# Patient Record
Sex: FEMALE | Race: WHITE | NOT HISPANIC OR LATINO | Employment: UNEMPLOYED | URBAN - NONMETROPOLITAN AREA
[De-identification: names, ages, dates, MRNs, and addresses within clinical notes are randomized per-mention and may not be internally consistent; named-entity substitution may affect disease eponyms.]

---

## 2019-01-01 ENCOUNTER — LAB REQUISITION (OUTPATIENT)
Dept: LAB | Facility: HOSPITAL | Age: 84
End: 2019-01-01

## 2019-01-01 ENCOUNTER — TRANSCRIBE ORDERS (OUTPATIENT)
Dept: ADMINISTRATIVE | Facility: HOSPITAL | Age: 84
End: 2019-01-01

## 2019-01-01 ENCOUNTER — HOSPITAL ENCOUNTER (INPATIENT)
Facility: HOSPITAL | Age: 84
LOS: 2 days | Discharge: HOME HEALTH | End: 2019-12-30
Attending: EMERGENCY MEDICINE | Admitting: INTERNAL MEDICINE

## 2019-01-01 ENCOUNTER — APPOINTMENT (OUTPATIENT)
Dept: LAB | Facility: HOSPITAL | Age: 84
End: 2019-01-01

## 2019-01-01 ENCOUNTER — TRANSCRIBE ORDERS (OUTPATIENT)
Dept: INFUSION THERAPY | Facility: HOSPITAL | Age: 84
End: 2019-01-01

## 2019-01-01 ENCOUNTER — HOSPITAL ENCOUNTER (OUTPATIENT)
Dept: GENERAL RADIOLOGY | Facility: HOSPITAL | Age: 84
Discharge: HOME | End: 2019-11-22

## 2019-01-01 ENCOUNTER — HOSPITAL ENCOUNTER (OUTPATIENT)
Dept: INFUSION THERAPY | Facility: HOSPITAL | Age: 84
Discharge: HOME | End: 2019-12-06
Admitting: INTERNAL MEDICINE

## 2019-01-01 ENCOUNTER — READMISSION MANAGEMENT (OUTPATIENT)
Dept: CALL CENTER | Facility: HOSPITAL | Age: 84
End: 2019-01-01

## 2019-01-01 ENCOUNTER — APPOINTMENT (OUTPATIENT)
Dept: GENERAL RADIOLOGY | Facility: HOSPITAL | Age: 84
End: 2019-01-01

## 2019-01-01 ENCOUNTER — HOSPITAL ENCOUNTER (OUTPATIENT)
Dept: INFUSION THERAPY | Facility: HOSPITAL | Age: 84
Discharge: HOME | End: 2019-11-22
Admitting: INTERNAL MEDICINE

## 2019-01-01 ENCOUNTER — HOSPITAL ENCOUNTER (EMERGENCY)
Facility: HOSPITAL | Age: 84
Discharge: HOME | End: 2019-10-15
Attending: EMERGENCY MEDICINE | Admitting: EMERGENCY MEDICINE

## 2019-01-01 ENCOUNTER — HOSPITAL ENCOUNTER (OUTPATIENT)
Dept: INFUSION THERAPY | Facility: HOSPITAL | Age: 84
Discharge: HOME | End: 2019-11-21
Admitting: INTERNAL MEDICINE

## 2019-01-01 ENCOUNTER — HOSPITAL ENCOUNTER (OUTPATIENT)
Dept: INFUSION THERAPY | Facility: HOSPITAL | Age: 84
Discharge: HOME | End: 2019-11-07
Admitting: INTERNAL MEDICINE

## 2019-01-01 ENCOUNTER — HOSPITAL ENCOUNTER (INPATIENT)
Facility: HOSPITAL | Age: 84
LOS: 6 days | Discharge: HOME HEALTH | End: 2019-11-28
Attending: EMERGENCY MEDICINE | Admitting: INTERNAL MEDICINE

## 2019-01-01 ENCOUNTER — HOSPITAL ENCOUNTER (INPATIENT)
Facility: HOSPITAL | Age: 84
LOS: 2 days | Discharge: HOME HEALTH | End: 2019-07-12
Attending: EMERGENCY MEDICINE | Admitting: HOSPITALIST

## 2019-01-01 ENCOUNTER — HOSPITAL ENCOUNTER (OUTPATIENT)
Dept: INFUSION THERAPY | Facility: HOSPITAL | Age: 84
Discharge: HOME | End: 2019-12-18
Admitting: INTERNAL MEDICINE

## 2019-01-01 ENCOUNTER — APPOINTMENT (OUTPATIENT)
Dept: ONCOLOGY | Facility: HOSPITAL | Age: 84
End: 2019-01-01

## 2019-01-01 ENCOUNTER — TELEPHONE (OUTPATIENT)
Dept: MEDSURG UNIT | Facility: HOSPITAL | Age: 84
End: 2019-01-01

## 2019-01-01 ENCOUNTER — LAB (OUTPATIENT)
Dept: LAB | Facility: HOSPITAL | Age: 84
End: 2019-01-01

## 2019-01-01 ENCOUNTER — APPOINTMENT (OUTPATIENT)
Dept: CT IMAGING | Facility: HOSPITAL | Age: 84
End: 2019-01-01

## 2019-01-01 ENCOUNTER — HOSPITAL ENCOUNTER (OUTPATIENT)
Dept: INFUSION THERAPY | Facility: HOSPITAL | Age: 84
Discharge: HOME | End: 2019-11-11
Admitting: INTERNAL MEDICINE

## 2019-01-01 ENCOUNTER — HOSPITAL ENCOUNTER (OUTPATIENT)
Facility: HOSPITAL | Age: 84
Setting detail: OBSERVATION
Discharge: HOME | End: 2019-11-14
Attending: INTERNAL MEDICINE | Admitting: INTERNAL MEDICINE

## 2019-01-01 ENCOUNTER — HOSPITAL ENCOUNTER (OUTPATIENT)
Dept: INFUSION THERAPY | Facility: HOSPITAL | Age: 84
Discharge: HOME | End: 2019-12-11
Admitting: INTERNAL MEDICINE

## 2019-01-01 ENCOUNTER — HOSPITAL ENCOUNTER (OUTPATIENT)
Dept: INFUSION THERAPY | Facility: HOSPITAL | Age: 84
Discharge: HOME | End: 2019-12-19
Admitting: INTERNAL MEDICINE

## 2019-01-01 ENCOUNTER — LAB (OUTPATIENT)
Dept: INFUSION THERAPY | Facility: HOSPITAL | Age: 84
End: 2019-01-01

## 2019-01-01 ENCOUNTER — HOSPITAL ENCOUNTER (OUTPATIENT)
Dept: INFUSION THERAPY | Facility: HOSPITAL | Age: 84
Setting detail: INFUSION SERIES
Discharge: HOME | End: 2019-11-29

## 2019-01-01 VITALS
DIASTOLIC BLOOD PRESSURE: 70 MMHG | OXYGEN SATURATION: 95 % | SYSTOLIC BLOOD PRESSURE: 132 MMHG | RESPIRATION RATE: 16 BRPM | HEART RATE: 73 BPM | TEMPERATURE: 97.4 F

## 2019-01-01 VITALS
HEART RATE: 90 BPM | DIASTOLIC BLOOD PRESSURE: 52 MMHG | SYSTOLIC BLOOD PRESSURE: 120 MMHG | RESPIRATION RATE: 16 BRPM | TEMPERATURE: 98.7 F

## 2019-01-01 VITALS
RESPIRATION RATE: 20 BRPM | HEART RATE: 81 BPM | OXYGEN SATURATION: 98 % | WEIGHT: 128 LBS | TEMPERATURE: 98.2 F | BODY MASS INDEX: 25.13 KG/M2 | SYSTOLIC BLOOD PRESSURE: 132 MMHG | DIASTOLIC BLOOD PRESSURE: 88 MMHG | HEIGHT: 60 IN

## 2019-01-01 VITALS
DIASTOLIC BLOOD PRESSURE: 61 MMHG | SYSTOLIC BLOOD PRESSURE: 156 MMHG | HEART RATE: 78 BPM | RESPIRATION RATE: 18 BRPM | TEMPERATURE: 98.3 F

## 2019-01-01 VITALS
RESPIRATION RATE: 18 BRPM | WEIGHT: 111.3 LBS | TEMPERATURE: 97.7 F | BODY MASS INDEX: 21.85 KG/M2 | HEART RATE: 65 BPM | DIASTOLIC BLOOD PRESSURE: 68 MMHG | SYSTOLIC BLOOD PRESSURE: 152 MMHG | HEIGHT: 60 IN | OXYGEN SATURATION: 95 %

## 2019-01-01 VITALS
RESPIRATION RATE: 18 BRPM | DIASTOLIC BLOOD PRESSURE: 61 MMHG | OXYGEN SATURATION: 93 % | HEIGHT: 61 IN | WEIGHT: 137.25 LBS | BODY MASS INDEX: 25.91 KG/M2 | SYSTOLIC BLOOD PRESSURE: 123 MMHG | TEMPERATURE: 97.7 F | HEART RATE: 76 BPM

## 2019-01-01 VITALS
TEMPERATURE: 97.8 F | OXYGEN SATURATION: 99 % | RESPIRATION RATE: 18 BRPM | DIASTOLIC BLOOD PRESSURE: 48 MMHG | HEART RATE: 84 BPM | SYSTOLIC BLOOD PRESSURE: 123 MMHG

## 2019-01-01 VITALS
HEART RATE: 90 BPM | RESPIRATION RATE: 20 BRPM | SYSTOLIC BLOOD PRESSURE: 140 MMHG | TEMPERATURE: 97.8 F | OXYGEN SATURATION: 98 % | DIASTOLIC BLOOD PRESSURE: 68 MMHG

## 2019-01-01 VITALS
RESPIRATION RATE: 20 BRPM | HEART RATE: 97 BPM | SYSTOLIC BLOOD PRESSURE: 144 MMHG | DIASTOLIC BLOOD PRESSURE: 66 MMHG | OXYGEN SATURATION: 98 % | TEMPERATURE: 97.9 F

## 2019-01-01 VITALS
RESPIRATION RATE: 18 BRPM | HEART RATE: 92 BPM | WEIGHT: 124 LBS | OXYGEN SATURATION: 94 % | DIASTOLIC BLOOD PRESSURE: 58 MMHG | SYSTOLIC BLOOD PRESSURE: 135 MMHG | BODY MASS INDEX: 24.35 KG/M2 | TEMPERATURE: 100.2 F | HEIGHT: 60 IN

## 2019-01-01 VITALS
WEIGHT: 127.6 LBS | RESPIRATION RATE: 18 BRPM | BODY MASS INDEX: 25.05 KG/M2 | HEART RATE: 79 BPM | DIASTOLIC BLOOD PRESSURE: 56 MMHG | HEIGHT: 60 IN | OXYGEN SATURATION: 97 % | TEMPERATURE: 98 F | SYSTOLIC BLOOD PRESSURE: 139 MMHG

## 2019-01-01 VITALS
OXYGEN SATURATION: 95 % | SYSTOLIC BLOOD PRESSURE: 140 MMHG | TEMPERATURE: 98.1 F | RESPIRATION RATE: 17 BRPM | DIASTOLIC BLOOD PRESSURE: 59 MMHG | HEART RATE: 82 BPM

## 2019-01-01 VITALS
DIASTOLIC BLOOD PRESSURE: 58 MMHG | RESPIRATION RATE: 18 BRPM | OXYGEN SATURATION: 94 % | HEART RATE: 99 BPM | SYSTOLIC BLOOD PRESSURE: 133 MMHG | TEMPERATURE: 98.4 F

## 2019-01-01 DIAGNOSIS — D46.9 MYELODYSPLASTIC SYNDROME, UNSPECIFIED (HCC): ICD-10-CM

## 2019-01-01 DIAGNOSIS — S32.592A: Primary | ICD-10-CM

## 2019-01-01 DIAGNOSIS — D70.9 CHRONIC IDIOPATHIC NEUTROPENIA (HCC): ICD-10-CM

## 2019-01-01 DIAGNOSIS — D69.3 IMMUNE THROMBOCYTOPENIC PURPURA (HCC): ICD-10-CM

## 2019-01-01 DIAGNOSIS — D46.9 MYELODYSPLASTIC SYNDROME (HCC): ICD-10-CM

## 2019-01-01 DIAGNOSIS — D64.9 ANEMIA, UNSPECIFIED TYPE: Primary | ICD-10-CM

## 2019-01-01 DIAGNOSIS — R06.02 SHORTNESS OF BREATH: ICD-10-CM

## 2019-01-01 DIAGNOSIS — I10 ESSENTIAL (PRIMARY) HYPERTENSION: ICD-10-CM

## 2019-01-01 DIAGNOSIS — R50.81 NEUTROPENIC FEVER (HCC): ICD-10-CM

## 2019-01-01 DIAGNOSIS — D46.22 REFRACTORY ANEMIA WITH EXCESS BLASTS-2 (HCC): Primary | ICD-10-CM

## 2019-01-01 DIAGNOSIS — D59.10 AUTOIMMUNE HEMOLYTIC ANEMIA WITH IMMUNE THROMBOCYTOPENIA (HCC): Primary | ICD-10-CM

## 2019-01-01 DIAGNOSIS — D70.9 NEUTROPENIA, UNSPECIFIED TYPE (HCC): ICD-10-CM

## 2019-01-01 DIAGNOSIS — D46.22 RAEB-2 (REFRACTORY ANEMIA WITH EXCESS BLASTS-2) (HCC): Primary | ICD-10-CM

## 2019-01-01 DIAGNOSIS — C92.90 MYELOID LEUKEMIA, NOT HAVING ACHIEVED REMISSION, UNSPECIFIED MYELOID LEUKEMIA TYPE (HCC): Primary | ICD-10-CM

## 2019-01-01 DIAGNOSIS — D64.9 ANEMIA, UNSPECIFIED TYPE: ICD-10-CM

## 2019-01-01 DIAGNOSIS — D61.818 PANCYTOPENIA (HCC): ICD-10-CM

## 2019-01-01 DIAGNOSIS — D46.22 RAEB-2 (REFRACTORY ANEMIA WITH EXCESS BLASTS-2) (HCC): ICD-10-CM

## 2019-01-01 DIAGNOSIS — D69.6 THROMBOCYTOPENIA (HCC): ICD-10-CM

## 2019-01-01 DIAGNOSIS — D69.6 THROMBOCYTOPENIA (HCC): Primary | ICD-10-CM

## 2019-01-01 DIAGNOSIS — C95.90: ICD-10-CM

## 2019-01-01 DIAGNOSIS — J18.9 COMMUNITY ACQUIRED PNEUMONIA OF LEFT UPPER LOBE OF LUNG: Primary | ICD-10-CM

## 2019-01-01 DIAGNOSIS — N39.0 ACUTE LOWER UTI: Primary | ICD-10-CM

## 2019-01-01 DIAGNOSIS — R41.82 ALTERED MENTAL STATUS, UNSPECIFIED ALTERED MENTAL STATUS TYPE: ICD-10-CM

## 2019-01-01 DIAGNOSIS — D69.6 AUTOIMMUNE HEMOLYTIC ANEMIA WITH IMMUNE THROMBOCYTOPENIA (HCC): Primary | ICD-10-CM

## 2019-01-01 DIAGNOSIS — R68.89 OTHER GENERAL SYMPTOMS AND SIGNS: ICD-10-CM

## 2019-01-01 DIAGNOSIS — R05.9 COUGH: ICD-10-CM

## 2019-01-01 DIAGNOSIS — D70.9 NEUTROPENIC SEPSIS (HCC): ICD-10-CM

## 2019-01-01 DIAGNOSIS — R50.81 FEBRILE NEUTROPENIA (HCC): ICD-10-CM

## 2019-01-01 DIAGNOSIS — D64.9 ANEMIA, UNSPECIFIED: ICD-10-CM

## 2019-01-01 DIAGNOSIS — C95.92: ICD-10-CM

## 2019-01-01 DIAGNOSIS — C92.00 ACUTE MYELOID LEUKEMIA NOT HAVING ACHIEVED REMISSION (HCC): ICD-10-CM

## 2019-01-01 DIAGNOSIS — J18.9 PNEUMONIA OF LEFT UPPER LOBE DUE TO INFECTIOUS ORGANISM: Primary | ICD-10-CM

## 2019-01-01 DIAGNOSIS — R30.0 DYSURIA: ICD-10-CM

## 2019-01-01 DIAGNOSIS — A41.9 NEUTROPENIC SEPSIS (HCC): ICD-10-CM

## 2019-01-01 DIAGNOSIS — N39.0 URINARY TRACT INFECTION, SITE NOT SPECIFIED: ICD-10-CM

## 2019-01-01 DIAGNOSIS — D70.9 NEUTROPENIC FEVER (HCC): ICD-10-CM

## 2019-01-01 DIAGNOSIS — S32.010A COMPRESSION FRACTURE OF L1 VERTEBRA, INITIAL ENCOUNTER (HCC): ICD-10-CM

## 2019-01-01 DIAGNOSIS — R50.9 FEVER OF UNKNOWN ORIGIN: ICD-10-CM

## 2019-01-01 DIAGNOSIS — D64.9 CHRONIC ANEMIA: ICD-10-CM

## 2019-01-01 DIAGNOSIS — E83.42 HYPOMAGNESEMIA: ICD-10-CM

## 2019-01-01 DIAGNOSIS — D70.9 FEBRILE NEUTROPENIA (HCC): ICD-10-CM

## 2019-01-01 LAB
ABO + RH BLD: NORMAL
ABO GROUP BLD: NORMAL
ALBUMIN SERPL-MCNC: 2.55 G/DL (ref 3.5–5.2)
ALBUMIN SERPL-MCNC: 2.97 G/DL (ref 3.5–5.2)
ALBUMIN SERPL-MCNC: 2.98 G/DL (ref 3.5–5.2)
ALBUMIN SERPL-MCNC: 3.09 G/DL (ref 3.5–5.2)
ALBUMIN SERPL-MCNC: 3.28 G/DL (ref 3.5–5.2)
ALBUMIN SERPL-MCNC: 3.28 G/DL (ref 3.5–5.2)
ALBUMIN SERPL-MCNC: 3.35 G/DL (ref 3.5–5.2)
ALBUMIN SERPL-MCNC: 3.38 G/DL (ref 3.5–5.2)
ALBUMIN SERPL-MCNC: 3.57 G/DL (ref 3.5–5.2)
ALBUMIN SERPL-MCNC: 3.66 G/DL (ref 3.5–5.2)
ALBUMIN SERPL-MCNC: 3.81 G/DL (ref 3.5–5.2)
ALBUMIN SERPL-MCNC: 3.86 G/DL (ref 3.5–5.2)
ALBUMIN SERPL-MCNC: 3.94 G/DL (ref 3.5–5.2)
ALBUMIN SERPL-MCNC: 4.27 G/DL (ref 3.5–5.2)
ALBUMIN/GLOB SERPL: 0.6 G/DL
ALBUMIN/GLOB SERPL: 0.6 G/DL
ALBUMIN/GLOB SERPL: 0.7 G/DL
ALBUMIN/GLOB SERPL: 0.8 G/DL
ALBUMIN/GLOB SERPL: 0.9 G/DL
ALBUMIN/GLOB SERPL: 1 G/DL
ALBUMIN/GLOB SERPL: 1.1 G/DL
ALBUMIN/GLOB SERPL: 1.2 G/DL
ALBUMIN/GLOB SERPL: 1.3 G/DL
ALBUMIN/GLOB SERPL: 1.4 G/DL
ALP SERPL-CCNC: 131 U/L (ref 39–117)
ALP SERPL-CCNC: 58 U/L (ref 39–117)
ALP SERPL-CCNC: 62 U/L (ref 39–117)
ALP SERPL-CCNC: 63 U/L (ref 39–117)
ALP SERPL-CCNC: 63 U/L (ref 39–117)
ALP SERPL-CCNC: 68 U/L (ref 39–117)
ALP SERPL-CCNC: 69 U/L (ref 39–117)
ALP SERPL-CCNC: 72 U/L (ref 39–117)
ALP SERPL-CCNC: 75 U/L (ref 39–117)
ALP SERPL-CCNC: 77 U/L (ref 39–117)
ALP SERPL-CCNC: 80 U/L (ref 39–117)
ALP SERPL-CCNC: 81 U/L (ref 39–117)
ALP SERPL-CCNC: 83 U/L (ref 39–117)
ALP SERPL-CCNC: 94 U/L (ref 39–117)
ALT SERPL W P-5'-P-CCNC: 10 U/L (ref 1–33)
ALT SERPL W P-5'-P-CCNC: 11 U/L (ref 1–33)
ALT SERPL W P-5'-P-CCNC: 12 U/L (ref 1–33)
ALT SERPL W P-5'-P-CCNC: 15 U/L (ref 1–33)
ALT SERPL W P-5'-P-CCNC: 16 U/L (ref 1–33)
ALT SERPL W P-5'-P-CCNC: 6 U/L (ref 1–33)
ALT SERPL W P-5'-P-CCNC: 7 U/L (ref 1–33)
ALT SERPL W P-5'-P-CCNC: 8 U/L (ref 1–33)
ALT SERPL W P-5'-P-CCNC: 9 U/L (ref 1–33)
ALT SERPL W P-5'-P-CCNC: 9 U/L (ref 1–33)
ANION GAP SERPL CALCULATED.3IONS-SCNC: 11.4 MMOL/L (ref 5–15)
ANION GAP SERPL CALCULATED.3IONS-SCNC: 11.7 MMOL/L (ref 5–15)
ANION GAP SERPL CALCULATED.3IONS-SCNC: 11.7 MMOL/L (ref 5–15)
ANION GAP SERPL CALCULATED.3IONS-SCNC: 11.8 MMOL/L (ref 5–15)
ANION GAP SERPL CALCULATED.3IONS-SCNC: 11.9 MMOL/L (ref 5–15)
ANION GAP SERPL CALCULATED.3IONS-SCNC: 12.1 MMOL/L (ref 5–15)
ANION GAP SERPL CALCULATED.3IONS-SCNC: 12.2 MMOL/L (ref 5–15)
ANION GAP SERPL CALCULATED.3IONS-SCNC: 12.5 MMOL/L (ref 5–15)
ANION GAP SERPL CALCULATED.3IONS-SCNC: 12.7 MMOL/L (ref 5–15)
ANION GAP SERPL CALCULATED.3IONS-SCNC: 12.8 MMOL/L (ref 5–15)
ANION GAP SERPL CALCULATED.3IONS-SCNC: 12.8 MMOL/L (ref 5–15)
ANION GAP SERPL CALCULATED.3IONS-SCNC: 13 MMOL/L (ref 5–15)
ANION GAP SERPL CALCULATED.3IONS-SCNC: 13.2 MMOL/L (ref 5–15)
ANION GAP SERPL CALCULATED.3IONS-SCNC: 13.9 MMOL/L (ref 5–15)
ANION GAP SERPL CALCULATED.3IONS-SCNC: 14.4 MMOL/L (ref 5–15)
ANION GAP SERPL CALCULATED.3IONS-SCNC: 14.6 MMOL/L (ref 5–15)
ANION GAP SERPL CALCULATED.3IONS-SCNC: 14.8 MMOL/L (ref 5–15)
ANION GAP SERPL CALCULATED.3IONS-SCNC: 14.9 MMOL/L (ref 5–15)
ANION GAP SERPL CALCULATED.3IONS-SCNC: 17.5 MMOL/L (ref 5–15)
ANISOCYTOSIS BLD QL: ABNORMAL
ANISOCYTOSIS BLD QL: NORMAL
ANISOCYTOSIS BLD QL: NORMAL
AST SERPL-CCNC: 11 U/L (ref 1–32)
AST SERPL-CCNC: 12 U/L (ref 1–32)
AST SERPL-CCNC: 13 U/L (ref 1–32)
AST SERPL-CCNC: 14 U/L (ref 1–32)
AST SERPL-CCNC: 15 U/L (ref 1–32)
AST SERPL-CCNC: 18 U/L (ref 1–32)
AST SERPL-CCNC: 28 U/L (ref 1–32)
AST SERPL-CCNC: 9 U/L (ref 1–32)
B PERT DNA SPEC QL NAA+PROBE: NOT DETECTED
B PERT DNA SPEC QL NAA+PROBE: NOT DETECTED
BACTERIA SPEC AEROBE CULT: ABNORMAL
BACTERIA SPEC AEROBE CULT: ABNORMAL
BACTERIA SPEC AEROBE CULT: NORMAL
BACTERIA SPEC RESP CULT: ABNORMAL
BACTERIA SPEC RESP CULT: ABNORMAL
BACTERIA SPEC RESP CULT: NORMAL
BACTERIA UR QL AUTO: ABNORMAL /HPF
BASOPHILS # BLD AUTO: 0 10*3/MM3 (ref 0–0.2)
BASOPHILS # BLD AUTO: 0.01 10*3/MM3 (ref 0–0.2)
BASOPHILS NFR BLD AUTO: 0 % (ref 0–1.5)
BASOPHILS NFR BLD AUTO: 0.5 % (ref 0–1.5)
BH BB BLOOD EXPIRATION DATE: NORMAL
BH BB BLOOD TYPE BARCODE: 1700
BH BB BLOOD TYPE BARCODE: 5100
BH BB BLOOD TYPE BARCODE: 6200
BH BB BLOOD TYPE BARCODE: 7300
BH BB BLOOD TYPE BARCODE: 7300
BH BB DISPENSE STATUS: NORMAL
BH BB PRODUCT CODE: NORMAL
BH BB UNIT NUMBER: NORMAL
BILIRUB SERPL-MCNC: 0.2 MG/DL (ref 0.2–1.2)
BILIRUB SERPL-MCNC: 0.3 MG/DL (ref 0.2–1.2)
BILIRUB SERPL-MCNC: 0.4 MG/DL (ref 0.2–1.2)
BILIRUB SERPL-MCNC: 0.5 MG/DL (ref 0.2–1.2)
BILIRUB SERPL-MCNC: 0.7 MG/DL (ref 0.2–1.2)
BILIRUB UR QL STRIP: NEGATIVE
BLASTS NFR BLD MANUAL: 1 % (ref 0–0)
BLASTS NFR BLD MANUAL: 12 % (ref 0–0)
BLASTS NFR BLD MANUAL: 16 % (ref 0–0)
BLASTS NFR BLD MANUAL: 2 % (ref 0–0)
BLASTS NFR BLD MANUAL: 4 % (ref 0–0)
BLASTS NFR BLD MANUAL: 4 % (ref 0–0)
BLASTS NFR BLD MANUAL: 5 % (ref 0–0)
BLASTS NFR BLD MANUAL: 6 % (ref 0–0)
BLASTS NFR BLD MANUAL: 6 % (ref 0–0)
BLASTS NFR BLD MANUAL: 8 % (ref 0–0)
BLASTS NFR BLD MANUAL: 8 % (ref 0–0)
BLD GP AB SCN SERPL QL: NEGATIVE
BUN BLD-MCNC: 10 MG/DL (ref 8–23)
BUN BLD-MCNC: 11 MG/DL (ref 8–23)
BUN BLD-MCNC: 12 MG/DL (ref 8–23)
BUN BLD-MCNC: 13 MG/DL (ref 8–23)
BUN BLD-MCNC: 14 MG/DL (ref 8–23)
BUN BLD-MCNC: 15 MG/DL (ref 8–23)
BUN BLD-MCNC: 18 MG/DL (ref 8–23)
BUN BLD-MCNC: 18 MG/DL (ref 8–23)
BUN BLD-MCNC: 19 MG/DL (ref 8–23)
BUN BLD-MCNC: 9 MG/DL (ref 8–23)
BUN BLD-MCNC: 9 MG/DL (ref 8–23)
BUN/CREAT SERPL: 14.5 (ref 7–25)
BUN/CREAT SERPL: 14.9 (ref 7–25)
BUN/CREAT SERPL: 15.5 (ref 7–25)
BUN/CREAT SERPL: 16.2 (ref 7–25)
BUN/CREAT SERPL: 16.4 (ref 7–25)
BUN/CREAT SERPL: 16.4 (ref 7–25)
BUN/CREAT SERPL: 16.7 (ref 7–25)
BUN/CREAT SERPL: 18.2 (ref 7–25)
BUN/CREAT SERPL: 19 (ref 7–25)
BUN/CREAT SERPL: 19.1 (ref 7–25)
BUN/CREAT SERPL: 19.2 (ref 7–25)
BUN/CREAT SERPL: 21.6 (ref 7–25)
BUN/CREAT SERPL: 22.1 (ref 7–25)
BUN/CREAT SERPL: 22.2 (ref 7–25)
BUN/CREAT SERPL: 24 (ref 7–25)
BUN/CREAT SERPL: 25 (ref 7–25)
BUN/CREAT SERPL: 27.5 (ref 7–25)
C PNEUM DNA NPH QL NAA+NON-PROBE: NOT DETECTED
C PNEUM DNA NPH QL NAA+NON-PROBE: NOT DETECTED
C3 FRG RBC-MCNC: ABNORMAL
C3 FRG RBC-MCNC: ABNORMAL
CALCIUM SPEC-SCNC: 8 MG/DL (ref 8.2–9.6)
CALCIUM SPEC-SCNC: 8.3 MG/DL (ref 8.2–9.6)
CALCIUM SPEC-SCNC: 8.3 MG/DL (ref 8.2–9.6)
CALCIUM SPEC-SCNC: 8.4 MG/DL (ref 8.2–9.6)
CALCIUM SPEC-SCNC: 8.5 MG/DL (ref 8.2–9.6)
CALCIUM SPEC-SCNC: 8.6 MG/DL (ref 8.2–9.6)
CALCIUM SPEC-SCNC: 8.7 MG/DL (ref 8.2–9.6)
CALCIUM SPEC-SCNC: 8.8 MG/DL (ref 8.2–9.6)
CALCIUM SPEC-SCNC: 9 MG/DL (ref 8.2–9.6)
CALCIUM SPEC-SCNC: 9.1 MG/DL (ref 8.2–9.6)
CALCIUM SPEC-SCNC: 9.3 MG/DL (ref 8.2–9.6)
CALCIUM SPEC-SCNC: 9.4 MG/DL (ref 8.2–9.6)
CALCIUM SPEC-SCNC: 9.5 MG/DL (ref 8.2–9.6)
CALCIUM SPEC-SCNC: 9.5 MG/DL (ref 8.2–9.6)
CHLORIDE SERPL-SCNC: 100 MMOL/L (ref 98–107)
CHLORIDE SERPL-SCNC: 100 MMOL/L (ref 98–107)
CHLORIDE SERPL-SCNC: 101 MMOL/L (ref 98–107)
CHLORIDE SERPL-SCNC: 101 MMOL/L (ref 98–107)
CHLORIDE SERPL-SCNC: 102 MMOL/L (ref 98–107)
CHLORIDE SERPL-SCNC: 103 MMOL/L (ref 98–107)
CHLORIDE SERPL-SCNC: 103 MMOL/L (ref 98–107)
CHLORIDE SERPL-SCNC: 104 MMOL/L (ref 98–107)
CHLORIDE SERPL-SCNC: 104 MMOL/L (ref 98–107)
CHLORIDE SERPL-SCNC: 105 MMOL/L (ref 98–107)
CHLORIDE SERPL-SCNC: 106 MMOL/L (ref 98–107)
CHLORIDE SERPL-SCNC: 107 MMOL/L (ref 98–107)
CHLORIDE SERPL-SCNC: 98 MMOL/L (ref 98–107)
CHLORIDE SERPL-SCNC: 99 MMOL/L (ref 98–107)
CHLORIDE SERPL-SCNC: 99 MMOL/L (ref 98–107)
CLARITY UR: ABNORMAL
CLARITY UR: ABNORMAL
CLARITY UR: CLEAR
CO2 SERPL-SCNC: 18.1 MMOL/L (ref 22–29)
CO2 SERPL-SCNC: 19.1 MMOL/L (ref 22–29)
CO2 SERPL-SCNC: 19.2 MMOL/L (ref 22–29)
CO2 SERPL-SCNC: 19.3 MMOL/L (ref 22–29)
CO2 SERPL-SCNC: 19.5 MMOL/L (ref 22–29)
CO2 SERPL-SCNC: 20.3 MMOL/L (ref 22–29)
CO2 SERPL-SCNC: 21.1 MMOL/L (ref 22–29)
CO2 SERPL-SCNC: 21.6 MMOL/L (ref 22–29)
CO2 SERPL-SCNC: 21.8 MMOL/L (ref 22–29)
CO2 SERPL-SCNC: 22.2 MMOL/L (ref 22–29)
CO2 SERPL-SCNC: 22.5 MMOL/L (ref 22–29)
CO2 SERPL-SCNC: 23.2 MMOL/L (ref 22–29)
CO2 SERPL-SCNC: 23.2 MMOL/L (ref 22–29)
CO2 SERPL-SCNC: 23.3 MMOL/L (ref 22–29)
CO2 SERPL-SCNC: 23.4 MMOL/L (ref 22–29)
CO2 SERPL-SCNC: 23.8 MMOL/L (ref 22–29)
CO2 SERPL-SCNC: 23.9 MMOL/L (ref 22–29)
CO2 SERPL-SCNC: 26.6 MMOL/L (ref 22–29)
CO2 SERPL-SCNC: 27 MMOL/L (ref 22–29)
COLOR UR: ABNORMAL
COLOR UR: YELLOW
CREAT BLD-MCNC: 0.51 MG/DL (ref 0.57–1)
CREAT BLD-MCNC: 0.51 MG/DL (ref 0.57–1)
CREAT BLD-MCNC: 0.52 MG/DL (ref 0.57–1)
CREAT BLD-MCNC: 0.52 MG/DL (ref 0.57–1)
CREAT BLD-MCNC: 0.54 MG/DL (ref 0.57–1)
CREAT BLD-MCNC: 0.54 MG/DL (ref 0.57–1)
CREAT BLD-MCNC: 0.58 MG/DL (ref 0.57–1)
CREAT BLD-MCNC: 0.58 MG/DL (ref 0.57–1)
CREAT BLD-MCNC: 0.61 MG/DL (ref 0.57–1)
CREAT BLD-MCNC: 0.63 MG/DL (ref 0.57–1)
CREAT BLD-MCNC: 0.66 MG/DL (ref 0.57–1)
CREAT BLD-MCNC: 0.67 MG/DL (ref 0.57–1)
CREAT BLD-MCNC: 0.67 MG/DL (ref 0.57–1)
CREAT BLD-MCNC: 0.69 MG/DL (ref 0.57–1)
CREAT BLD-MCNC: 0.74 MG/DL (ref 0.57–1)
CREAT BLD-MCNC: 0.75 MG/DL (ref 0.57–1)
CREAT BLD-MCNC: 0.79 MG/DL (ref 0.57–1)
CREAT BLD-MCNC: 0.86 MG/DL (ref 0.57–1)
CREAT BLD-MCNC: 0.94 MG/DL (ref 0.57–1)
CROSSMATCH INTERPRETATION: NORMAL
CROSSMATCH INTERPRETATION: NORMAL
CRP SERPL-MCNC: 0.42 MG/DL (ref 0–0.5)
CRP SERPL-MCNC: 6.66 MG/DL (ref 0–0.5)
D-LACTATE SERPL-SCNC: 1.1 MMOL/L (ref 0.5–2)
D-LACTATE SERPL-SCNC: 1.1 MMOL/L (ref 0.5–2)
D-LACTATE SERPL-SCNC: 1.3 MMOL/L (ref 0.5–2)
D-LACTATE SERPL-SCNC: 2.5 MMOL/L (ref 0.5–2)
D-LACTATE SERPL-SCNC: 3.1 MMOL/L (ref 0.5–2)
DACRYOCYTES BLD QL SMEAR: ABNORMAL
DEPRECATED RDW RBC AUTO: 54.1 FL (ref 37–54)
DEPRECATED RDW RBC AUTO: 54.6 FL (ref 37–54)
DEPRECATED RDW RBC AUTO: 55.6 FL (ref 37–54)
DEPRECATED RDW RBC AUTO: 55.7 FL (ref 37–54)
DEPRECATED RDW RBC AUTO: 57.1 FL (ref 37–54)
DEPRECATED RDW RBC AUTO: 57.2 FL (ref 37–54)
DEPRECATED RDW RBC AUTO: 57.3 FL (ref 37–54)
DEPRECATED RDW RBC AUTO: 57.4 FL (ref 37–54)
DEPRECATED RDW RBC AUTO: 57.6 FL (ref 37–54)
DEPRECATED RDW RBC AUTO: 58.1 FL (ref 37–54)
DEPRECATED RDW RBC AUTO: 58.4 FL (ref 37–54)
DEPRECATED RDW RBC AUTO: 58.5 FL (ref 37–54)
DEPRECATED RDW RBC AUTO: 58.7 FL (ref 37–54)
DEPRECATED RDW RBC AUTO: 60.1 FL (ref 37–54)
DEPRECATED RDW RBC AUTO: 60.6 FL (ref 37–54)
DEPRECATED RDW RBC AUTO: 60.8 FL (ref 37–54)
DEPRECATED RDW RBC AUTO: 61.2 FL (ref 37–54)
DEPRECATED RDW RBC AUTO: 61.3 FL (ref 37–54)
DEPRECATED RDW RBC AUTO: 63.7 FL (ref 37–54)
DEPRECATED RDW RBC AUTO: 63.8 FL (ref 37–54)
DEPRECATED RDW RBC AUTO: 66.9 FL (ref 37–54)
DEPRECATED RDW RBC AUTO: 67 FL (ref 37–54)
DEPRECATED RDW RBC AUTO: 68.1 FL (ref 37–54)
DEPRECATED RDW RBC AUTO: 68.7 FL (ref 37–54)
DEPRECATED RDW RBC AUTO: 68.9 FL (ref 37–54)
DEPRECATED RDW RBC AUTO: 69.3 FL (ref 37–54)
DEPRECATED RDW RBC AUTO: 69.6 FL (ref 37–54)
DEPRECATED RDW RBC AUTO: 70.1 FL (ref 37–54)
DEPRECATED RDW RBC AUTO: 70.3 FL (ref 37–54)
DEPRECATED RDW RBC AUTO: 70.4 FL (ref 37–54)
DEPRECATED RDW RBC AUTO: 70.4 FL (ref 37–54)
DEPRECATED RDW RBC AUTO: 70.7 FL (ref 37–54)
DEPRECATED RDW RBC AUTO: 71.4 FL (ref 37–54)
DEPRECATED RDW RBC AUTO: 72.4 FL (ref 37–54)
DEPRECATED RDW RBC AUTO: 73.5 FL (ref 37–54)
EOSINOPHIL # BLD AUTO: 0 10*3/MM3 (ref 0–0.4)
EOSINOPHIL # BLD AUTO: 0.02 10*3/MM3 (ref 0–0.4)
EOSINOPHIL # BLD MANUAL: 0.01 10*3/MM3 (ref 0–0.4)
EOSINOPHIL # BLD MANUAL: 0.02 10*3/MM3 (ref 0–0.4)
EOSINOPHIL # BLD MANUAL: 0.03 10*3/MM3 (ref 0–0.4)
EOSINOPHIL # BLD MANUAL: 0.03 10*3/MM3 (ref 0–0.4)
EOSINOPHIL # BLD MANUAL: 0.04 10*3/MM3 (ref 0–0.4)
EOSINOPHIL # BLD MANUAL: 0.04 10*3/MM3 (ref 0–0.4)
EOSINOPHIL # BLD MANUAL: 0.05 10*3/MM3 (ref 0–0.4)
EOSINOPHIL NFR BLD AUTO: 0 % (ref 0.3–6.2)
EOSINOPHIL NFR BLD AUTO: 1 % (ref 0.3–6.2)
EOSINOPHIL NFR BLD MANUAL: 1 % (ref 0.3–6.2)
EOSINOPHIL NFR BLD MANUAL: 2 % (ref 0.3–6.2)
EOSINOPHIL NFR BLD MANUAL: 4 % (ref 0.3–6.2)
ERYTHROCYTE [DISTWIDTH] IN BLOOD BY AUTOMATED COUNT: 14.6 % (ref 12.3–15.4)
ERYTHROCYTE [DISTWIDTH] IN BLOOD BY AUTOMATED COUNT: 14.8 % (ref 12.3–15.4)
ERYTHROCYTE [DISTWIDTH] IN BLOOD BY AUTOMATED COUNT: 16.4 % (ref 12.3–15.4)
ERYTHROCYTE [DISTWIDTH] IN BLOOD BY AUTOMATED COUNT: 16.6 % (ref 12.3–15.4)
ERYTHROCYTE [DISTWIDTH] IN BLOOD BY AUTOMATED COUNT: 17 % (ref 12.3–15.4)
ERYTHROCYTE [DISTWIDTH] IN BLOOD BY AUTOMATED COUNT: 17.1 % (ref 12.3–15.4)
ERYTHROCYTE [DISTWIDTH] IN BLOOD BY AUTOMATED COUNT: 17.2 % (ref 12.3–15.4)
ERYTHROCYTE [DISTWIDTH] IN BLOOD BY AUTOMATED COUNT: 17.3 % (ref 12.3–15.4)
ERYTHROCYTE [DISTWIDTH] IN BLOOD BY AUTOMATED COUNT: 17.3 % (ref 12.3–15.4)
ERYTHROCYTE [DISTWIDTH] IN BLOOD BY AUTOMATED COUNT: 17.4 % (ref 12.3–15.4)
ERYTHROCYTE [DISTWIDTH] IN BLOOD BY AUTOMATED COUNT: 17.5 % (ref 12.3–15.4)
ERYTHROCYTE [DISTWIDTH] IN BLOOD BY AUTOMATED COUNT: 17.6 % (ref 12.3–15.4)
ERYTHROCYTE [DISTWIDTH] IN BLOOD BY AUTOMATED COUNT: 17.8 % (ref 12.3–15.4)
ERYTHROCYTE [DISTWIDTH] IN BLOOD BY AUTOMATED COUNT: 17.9 % (ref 12.3–15.4)
ERYTHROCYTE [DISTWIDTH] IN BLOOD BY AUTOMATED COUNT: 18.1 % (ref 12.3–15.4)
ERYTHROCYTE [DISTWIDTH] IN BLOOD BY AUTOMATED COUNT: 18.2 % (ref 12.3–15.4)
ERYTHROCYTE [DISTWIDTH] IN BLOOD BY AUTOMATED COUNT: 18.4 % (ref 12.3–15.4)
ERYTHROCYTE [DISTWIDTH] IN BLOOD BY AUTOMATED COUNT: 18.6 % (ref 12.3–15.4)
ERYTHROCYTE [DISTWIDTH] IN BLOOD BY AUTOMATED COUNT: 18.7 % (ref 12.3–15.4)
ERYTHROCYTE [DISTWIDTH] IN BLOOD BY AUTOMATED COUNT: 18.8 % (ref 12.3–15.4)
ERYTHROCYTE [DISTWIDTH] IN BLOOD BY AUTOMATED COUNT: 19.1 % (ref 12.3–15.4)
ERYTHROCYTE [DISTWIDTH] IN BLOOD BY AUTOMATED COUNT: 19.4 % (ref 12.3–15.4)
ERYTHROCYTE [DISTWIDTH] IN BLOOD BY AUTOMATED COUNT: 20.2 % (ref 12.3–15.4)
ERYTHROCYTE [DISTWIDTH] IN BLOOD BY AUTOMATED COUNT: 20.4 % (ref 12.3–15.4)
FLUAV AG NPH QL: NEGATIVE
FLUAV H1 2009 PAND RNA NPH QL NAA+PROBE: NOT DETECTED
FLUAV H1 2009 PAND RNA NPH QL NAA+PROBE: NOT DETECTED
FLUAV H1 HA GENE NPH QL NAA+PROBE: NOT DETECTED
FLUAV H1 HA GENE NPH QL NAA+PROBE: NOT DETECTED
FLUAV H3 RNA NPH QL NAA+PROBE: NOT DETECTED
FLUAV H3 RNA NPH QL NAA+PROBE: NOT DETECTED
FLUAV SUBTYP SPEC NAA+PROBE: NOT DETECTED
FLUAV SUBTYP SPEC NAA+PROBE: NOT DETECTED
FLUBV AG NPH QL IA: NEGATIVE
FLUBV RNA ISLT QL NAA+PROBE: NOT DETECTED
FLUBV RNA ISLT QL NAA+PROBE: NOT DETECTED
FOLATE SERPL-MCNC: 12.3 NG/ML (ref 4.78–24.2)
GFR SERPL CREATININE-BSD FRML MDRD: 106 ML/MIN/1.73
GFR SERPL CREATININE-BSD FRML MDRD: 106 ML/MIN/1.73
GFR SERPL CREATININE-BSD FRML MDRD: 111 ML/MIN/1.73
GFR SERPL CREATININE-BSD FRML MDRD: 111 ML/MIN/1.73
GFR SERPL CREATININE-BSD FRML MDRD: 113 ML/MIN/1.73
GFR SERPL CREATININE-BSD FRML MDRD: 113 ML/MIN/1.73
GFR SERPL CREATININE-BSD FRML MDRD: 56 ML/MIN/1.73
GFR SERPL CREATININE-BSD FRML MDRD: 62 ML/MIN/1.73
GFR SERPL CREATININE-BSD FRML MDRD: 68 ML/MIN/1.73
GFR SERPL CREATININE-BSD FRML MDRD: 73 ML/MIN/1.73
GFR SERPL CREATININE-BSD FRML MDRD: 74 ML/MIN/1.73
GFR SERPL CREATININE-BSD FRML MDRD: 80 ML/MIN/1.73
GFR SERPL CREATININE-BSD FRML MDRD: 83 ML/MIN/1.73
GFR SERPL CREATININE-BSD FRML MDRD: 83 ML/MIN/1.73
GFR SERPL CREATININE-BSD FRML MDRD: 84 ML/MIN/1.73
GFR SERPL CREATININE-BSD FRML MDRD: 89 ML/MIN/1.73
GFR SERPL CREATININE-BSD FRML MDRD: 92 ML/MIN/1.73
GFR SERPL CREATININE-BSD FRML MDRD: 98 ML/MIN/1.73
GFR SERPL CREATININE-BSD FRML MDRD: 98 ML/MIN/1.73
GIANT PLATELETS: ABNORMAL
GLOBULIN UR ELPH-MCNC: 2.8 GM/DL
GLOBULIN UR ELPH-MCNC: 2.8 GM/DL
GLOBULIN UR ELPH-MCNC: 2.9 GM/DL
GLOBULIN UR ELPH-MCNC: 3 GM/DL
GLOBULIN UR ELPH-MCNC: 3.3 GM/DL
GLOBULIN UR ELPH-MCNC: 3.3 GM/DL
GLOBULIN UR ELPH-MCNC: 3.6 GM/DL
GLOBULIN UR ELPH-MCNC: 3.7 GM/DL
GLOBULIN UR ELPH-MCNC: 3.7 GM/DL
GLOBULIN UR ELPH-MCNC: 3.9 GM/DL
GLOBULIN UR ELPH-MCNC: 4.3 GM/DL
GLOBULIN UR ELPH-MCNC: 4.3 GM/DL
GLOBULIN UR ELPH-MCNC: 4.8 GM/DL
GLOBULIN UR ELPH-MCNC: 4.8 GM/DL
GLUCOSE BLD-MCNC: 119 MG/DL (ref 65–99)
GLUCOSE BLD-MCNC: 131 MG/DL (ref 65–99)
GLUCOSE BLD-MCNC: 146 MG/DL (ref 65–99)
GLUCOSE BLD-MCNC: 153 MG/DL (ref 65–99)
GLUCOSE BLD-MCNC: 157 MG/DL (ref 65–99)
GLUCOSE BLD-MCNC: 162 MG/DL (ref 65–99)
GLUCOSE BLD-MCNC: 164 MG/DL (ref 65–99)
GLUCOSE BLD-MCNC: 167 MG/DL (ref 65–99)
GLUCOSE BLD-MCNC: 169 MG/DL (ref 65–99)
GLUCOSE BLD-MCNC: 192 MG/DL (ref 65–99)
GLUCOSE BLD-MCNC: 193 MG/DL (ref 65–99)
GLUCOSE BLD-MCNC: 197 MG/DL (ref 65–99)
GLUCOSE BLD-MCNC: 211 MG/DL (ref 65–99)
GLUCOSE BLD-MCNC: 217 MG/DL (ref 65–99)
GLUCOSE BLD-MCNC: 224 MG/DL (ref 65–99)
GLUCOSE BLD-MCNC: 225 MG/DL (ref 65–99)
GLUCOSE BLD-MCNC: 228 MG/DL (ref 65–99)
GLUCOSE BLD-MCNC: 237 MG/DL (ref 65–99)
GLUCOSE BLD-MCNC: 245 MG/DL (ref 65–99)
GLUCOSE BLDC GLUCOMTR-MCNC: 125 MG/DL (ref 70–130)
GLUCOSE BLDC GLUCOMTR-MCNC: 130 MG/DL (ref 70–130)
GLUCOSE BLDC GLUCOMTR-MCNC: 135 MG/DL (ref 70–130)
GLUCOSE BLDC GLUCOMTR-MCNC: 145 MG/DL (ref 70–130)
GLUCOSE BLDC GLUCOMTR-MCNC: 146 MG/DL (ref 70–130)
GLUCOSE BLDC GLUCOMTR-MCNC: 155 MG/DL (ref 70–130)
GLUCOSE BLDC GLUCOMTR-MCNC: 163 MG/DL (ref 70–130)
GLUCOSE BLDC GLUCOMTR-MCNC: 165 MG/DL (ref 70–130)
GLUCOSE BLDC GLUCOMTR-MCNC: 168 MG/DL (ref 70–130)
GLUCOSE BLDC GLUCOMTR-MCNC: 170 MG/DL (ref 70–130)
GLUCOSE BLDC GLUCOMTR-MCNC: 171 MG/DL (ref 70–130)
GLUCOSE BLDC GLUCOMTR-MCNC: 178 MG/DL (ref 70–130)
GLUCOSE BLDC GLUCOMTR-MCNC: 182 MG/DL (ref 70–130)
GLUCOSE BLDC GLUCOMTR-MCNC: 184 MG/DL (ref 70–130)
GLUCOSE BLDC GLUCOMTR-MCNC: 189 MG/DL (ref 70–130)
GLUCOSE BLDC GLUCOMTR-MCNC: 189 MG/DL (ref 70–130)
GLUCOSE BLDC GLUCOMTR-MCNC: 190 MG/DL (ref 70–130)
GLUCOSE BLDC GLUCOMTR-MCNC: 191 MG/DL (ref 70–130)
GLUCOSE BLDC GLUCOMTR-MCNC: 194 MG/DL (ref 70–130)
GLUCOSE BLDC GLUCOMTR-MCNC: 196 MG/DL (ref 70–130)
GLUCOSE BLDC GLUCOMTR-MCNC: 196 MG/DL (ref 70–130)
GLUCOSE BLDC GLUCOMTR-MCNC: 203 MG/DL (ref 70–130)
GLUCOSE BLDC GLUCOMTR-MCNC: 205 MG/DL (ref 70–130)
GLUCOSE BLDC GLUCOMTR-MCNC: 209 MG/DL (ref 70–130)
GLUCOSE BLDC GLUCOMTR-MCNC: 211 MG/DL (ref 70–130)
GLUCOSE BLDC GLUCOMTR-MCNC: 211 MG/DL (ref 70–130)
GLUCOSE BLDC GLUCOMTR-MCNC: 212 MG/DL (ref 70–130)
GLUCOSE BLDC GLUCOMTR-MCNC: 217 MG/DL (ref 70–130)
GLUCOSE BLDC GLUCOMTR-MCNC: 221 MG/DL (ref 70–130)
GLUCOSE BLDC GLUCOMTR-MCNC: 221 MG/DL (ref 70–130)
GLUCOSE BLDC GLUCOMTR-MCNC: 223 MG/DL (ref 70–130)
GLUCOSE BLDC GLUCOMTR-MCNC: 225 MG/DL (ref 70–130)
GLUCOSE BLDC GLUCOMTR-MCNC: 233 MG/DL (ref 70–130)
GLUCOSE BLDC GLUCOMTR-MCNC: 244 MG/DL (ref 70–130)
GLUCOSE BLDC GLUCOMTR-MCNC: 272 MG/DL (ref 70–130)
GLUCOSE BLDC GLUCOMTR-MCNC: 283 MG/DL (ref 70–130)
GLUCOSE BLDC GLUCOMTR-MCNC: 287 MG/DL (ref 70–130)
GLUCOSE BLDC GLUCOMTR-MCNC: 324 MG/DL (ref 70–130)
GLUCOSE UR STRIP-MCNC: ABNORMAL MG/DL
GLUCOSE UR STRIP-MCNC: NEGATIVE MG/DL
GRAM STN SPEC: ABNORMAL
GRAM STN SPEC: NORMAL
HADV DNA SPEC NAA+PROBE: NOT DETECTED
HADV DNA SPEC NAA+PROBE: NOT DETECTED
HBA1C MFR BLD: 7.1 % (ref 4.8–5.6)
HBA1C MFR BLD: 7.6 % (ref 4.8–5.6)
HCOV 229E RNA SPEC QL NAA+PROBE: NOT DETECTED
HCOV 229E RNA SPEC QL NAA+PROBE: NOT DETECTED
HCOV HKU1 RNA SPEC QL NAA+PROBE: NOT DETECTED
HCOV HKU1 RNA SPEC QL NAA+PROBE: NOT DETECTED
HCOV NL63 RNA SPEC QL NAA+PROBE: NOT DETECTED
HCOV NL63 RNA SPEC QL NAA+PROBE: NOT DETECTED
HCOV OC43 RNA SPEC QL NAA+PROBE: NOT DETECTED
HCOV OC43 RNA SPEC QL NAA+PROBE: NOT DETECTED
HCT VFR BLD AUTO: 20.7 % (ref 34–46.6)
HCT VFR BLD AUTO: 21.1 % (ref 34–46.6)
HCT VFR BLD AUTO: 21.1 % (ref 34–46.6)
HCT VFR BLD AUTO: 21.6 % (ref 34–46.6)
HCT VFR BLD AUTO: 21.9 % (ref 34–46.6)
HCT VFR BLD AUTO: 23.2 % (ref 34–46.6)
HCT VFR BLD AUTO: 23.4 % (ref 34–46.6)
HCT VFR BLD AUTO: 23.6 % (ref 34–46.6)
HCT VFR BLD AUTO: 23.7 % (ref 34–46.6)
HCT VFR BLD AUTO: 23.8 % (ref 34–46.6)
HCT VFR BLD AUTO: 23.8 % (ref 34–46.6)
HCT VFR BLD AUTO: 23.9 % (ref 34–46.6)
HCT VFR BLD AUTO: 24.4 % (ref 34–46.6)
HCT VFR BLD AUTO: 24.5 % (ref 34–46.6)
HCT VFR BLD AUTO: 25.3 % (ref 34–46.6)
HCT VFR BLD AUTO: 25.4 % (ref 34–46.6)
HCT VFR BLD AUTO: 25.6 % (ref 34–46.6)
HCT VFR BLD AUTO: 25.9 % (ref 34–46.6)
HCT VFR BLD AUTO: 26.2 % (ref 34–46.6)
HCT VFR BLD AUTO: 26.3 % (ref 34–46.6)
HCT VFR BLD AUTO: 26.3 % (ref 34–46.6)
HCT VFR BLD AUTO: 26.8 % (ref 34–46.6)
HCT VFR BLD AUTO: 26.9 % (ref 34–46.6)
HCT VFR BLD AUTO: 27 % (ref 34–46.6)
HCT VFR BLD AUTO: 27.4 % (ref 34–46.6)
HCT VFR BLD AUTO: 27.5 % (ref 34–46.6)
HCT VFR BLD AUTO: 27.6 % (ref 34–46.6)
HCT VFR BLD AUTO: 28.2 % (ref 34–46.6)
HCT VFR BLD AUTO: 28.3 % (ref 34–46.6)
HCT VFR BLD AUTO: 28.6 % (ref 34–46.6)
HCT VFR BLD AUTO: 28.8 % (ref 34–46.6)
HCT VFR BLD AUTO: 29.1 % (ref 34–46.6)
HCT VFR BLD AUTO: 29.9 % (ref 34–46.6)
HCT VFR BLD AUTO: 30.1 % (ref 34–46.6)
HCT VFR BLD AUTO: 33.2 % (ref 34–46.6)
HCT VFR BLD AUTO: 34.8 % (ref 34–46.6)
HGB BLD-MCNC: 10.3 G/DL (ref 12–15.9)
HGB BLD-MCNC: 10.9 G/DL (ref 12–15.9)
HGB BLD-MCNC: 6.6 G/DL (ref 12–15.9)
HGB BLD-MCNC: 6.7 G/DL (ref 12–15.9)
HGB BLD-MCNC: 6.7 G/DL (ref 12–15.9)
HGB BLD-MCNC: 6.8 G/DL (ref 12–15.9)
HGB BLD-MCNC: 7 G/DL (ref 12–15.9)
HGB BLD-MCNC: 7.1 G/DL (ref 12–15.9)
HGB BLD-MCNC: 7.2 G/DL (ref 12–15.9)
HGB BLD-MCNC: 7.2 G/DL (ref 12–15.9)
HGB BLD-MCNC: 7.3 G/DL (ref 12–15.9)
HGB BLD-MCNC: 7.4 G/DL (ref 12–15.9)
HGB BLD-MCNC: 7.5 G/DL (ref 12–15.9)
HGB BLD-MCNC: 7.6 G/DL (ref 12–15.9)
HGB BLD-MCNC: 7.7 G/DL (ref 12–15.9)
HGB BLD-MCNC: 7.7 G/DL (ref 12–15.9)
HGB BLD-MCNC: 7.8 G/DL (ref 12–15.9)
HGB BLD-MCNC: 7.9 G/DL (ref 12–15.9)
HGB BLD-MCNC: 7.9 G/DL (ref 12–15.9)
HGB BLD-MCNC: 8 G/DL (ref 12–15.9)
HGB BLD-MCNC: 8.1 G/DL (ref 12–15.9)
HGB BLD-MCNC: 8.2 G/DL (ref 12–15.9)
HGB BLD-MCNC: 8.3 G/DL (ref 12–15.9)
HGB BLD-MCNC: 8.3 G/DL (ref 12–15.9)
HGB BLD-MCNC: 8.4 G/DL (ref 12–15.9)
HGB BLD-MCNC: 8.5 G/DL (ref 12–15.9)
HGB BLD-MCNC: 8.7 G/DL (ref 12–15.9)
HGB BLD-MCNC: 8.8 G/DL (ref 12–15.9)
HGB BLD-MCNC: 8.9 G/DL (ref 12–15.9)
HGB BLD-MCNC: 8.9 G/DL (ref 12–15.9)
HGB BLD-MCNC: 9.2 G/DL (ref 12–15.9)
HGB BLD-MCNC: 9.3 G/DL (ref 12–15.9)
HGB BLD-MCNC: 9.4 G/DL (ref 12–15.9)
HGB BLD-MCNC: 9.8 G/DL (ref 12–15.9)
HGB UR QL STRIP.AUTO: ABNORMAL
HGB UR QL STRIP.AUTO: NEGATIVE
HMPV RNA NPH QL NAA+NON-PROBE: DETECTED
HMPV RNA NPH QL NAA+NON-PROBE: NOT DETECTED
HOLD SPECIMEN: NORMAL
HPIV1 RNA SPEC QL NAA+PROBE: NOT DETECTED
HPIV1 RNA SPEC QL NAA+PROBE: NOT DETECTED
HPIV2 RNA SPEC QL NAA+PROBE: NOT DETECTED
HPIV2 RNA SPEC QL NAA+PROBE: NOT DETECTED
HPIV3 RNA NPH QL NAA+PROBE: NOT DETECTED
HPIV3 RNA NPH QL NAA+PROBE: NOT DETECTED
HPIV4 P GENE NPH QL NAA+PROBE: NOT DETECTED
HPIV4 P GENE NPH QL NAA+PROBE: NOT DETECTED
HYALINE CASTS UR QL AUTO: ABNORMAL /LPF
HYPOCHROMIA BLD QL: ABNORMAL
HYPOCHROMIA BLD QL: NORMAL
HYPOCHROMIA BLD QL: NORMAL
IMM GRANULOCYTES # BLD AUTO: 0 10*3/MM3 (ref 0–0.05)
IMM GRANULOCYTES # BLD AUTO: 0 10*3/MM3 (ref 0–0.05)
IMM GRANULOCYTES NFR BLD AUTO: 0 % (ref 0–0.5)
IMM GRANULOCYTES NFR BLD AUTO: 0 % (ref 0–0.5)
IRON 24H UR-MRATE: 89 MCG/DL (ref 37–145)
IRON SATN MFR SERPL: 31 % (ref 20–50)
KETONES UR QL STRIP: ABNORMAL
KETONES UR QL STRIP: ABNORMAL
KETONES UR QL STRIP: NEGATIVE
L PNEUMO1 AG UR QL IA: NEGATIVE
L PNEUMO1 AG UR QL IA: NEGATIVE
LARGE PLATELETS: ABNORMAL
LARGE PLATELETS: ABNORMAL
LEUKOCYTE ESTERASE UR QL STRIP.AUTO: ABNORMAL
LEUKOCYTE ESTERASE UR QL STRIP.AUTO: NEGATIVE
LYMPHOCYTES # BLD AUTO: 1.44 10*3/MM3 (ref 0.7–3.1)
LYMPHOCYTES # BLD AUTO: 1.48 10*3/MM3 (ref 0.7–3.1)
LYMPHOCYTES # BLD MANUAL: 0.5 10*3/MM3 (ref 0.7–3.1)
LYMPHOCYTES # BLD MANUAL: 0.69 10*3/MM3 (ref 0.7–3.1)
LYMPHOCYTES # BLD MANUAL: 0.79 10*3/MM3 (ref 0.7–3.1)
LYMPHOCYTES # BLD MANUAL: 0.92 10*3/MM3 (ref 0.7–3.1)
LYMPHOCYTES # BLD MANUAL: 0.96 10*3/MM3 (ref 0.7–3.1)
LYMPHOCYTES # BLD MANUAL: 1.1 10*3/MM3 (ref 0.7–3.1)
LYMPHOCYTES # BLD MANUAL: 1.11 10*3/MM3 (ref 0.7–3.1)
LYMPHOCYTES # BLD MANUAL: 1.15 10*3/MM3 (ref 0.7–3.1)
LYMPHOCYTES # BLD MANUAL: 1.2 10*3/MM3 (ref 0.7–3.1)
LYMPHOCYTES # BLD MANUAL: 1.21 10*3/MM3 (ref 0.7–3.1)
LYMPHOCYTES # BLD MANUAL: 1.24 10*3/MM3 (ref 0.7–3.1)
LYMPHOCYTES # BLD MANUAL: 1.29 10*3/MM3 (ref 0.7–3.1)
LYMPHOCYTES # BLD MANUAL: 1.32 10*3/MM3 (ref 0.7–3.1)
LYMPHOCYTES # BLD MANUAL: 1.33 10*3/MM3 (ref 0.7–3.1)
LYMPHOCYTES # BLD MANUAL: 1.33 10*3/MM3 (ref 0.7–3.1)
LYMPHOCYTES # BLD MANUAL: 1.35 10*3/MM3 (ref 0.7–3.1)
LYMPHOCYTES # BLD MANUAL: 1.35 10*3/MM3 (ref 0.7–3.1)
LYMPHOCYTES # BLD MANUAL: 1.42 10*3/MM3 (ref 0.7–3.1)
LYMPHOCYTES # BLD MANUAL: 1.45 10*3/MM3 (ref 0.7–3.1)
LYMPHOCYTES # BLD MANUAL: 1.49 10*3/MM3 (ref 0.7–3.1)
LYMPHOCYTES # BLD MANUAL: 1.53 10*3/MM3 (ref 0.7–3.1)
LYMPHOCYTES # BLD MANUAL: 1.54 10*3/MM3 (ref 0.7–3.1)
LYMPHOCYTES # BLD MANUAL: 1.57 10*3/MM3 (ref 0.7–3.1)
LYMPHOCYTES # BLD MANUAL: 1.58 10*3/MM3 (ref 0.7–3.1)
LYMPHOCYTES # BLD MANUAL: 1.72 10*3/MM3 (ref 0.7–3.1)
LYMPHOCYTES # BLD MANUAL: 1.74 10*3/MM3 (ref 0.7–3.1)
LYMPHOCYTES # BLD MANUAL: 1.75 10*3/MM3 (ref 0.7–3.1)
LYMPHOCYTES # BLD MANUAL: 1.82 10*3/MM3 (ref 0.7–3.1)
LYMPHOCYTES # BLD MANUAL: 1.97 10*3/MM3 (ref 0.7–3.1)
LYMPHOCYTES # BLD MANUAL: 2.09 10*3/MM3 (ref 0.7–3.1)
LYMPHOCYTES # BLD MANUAL: 2.31 10*3/MM3 (ref 0.7–3.1)
LYMPHOCYTES # BLD MANUAL: 2.56 10*3/MM3 (ref 0.7–3.1)
LYMPHOCYTES NFR BLD AUTO: 74.2 % (ref 19.6–45.3)
LYMPHOCYTES NFR BLD AUTO: 91.4 % (ref 19.6–45.3)
LYMPHOCYTES NFR BLD MANUAL: 1 % (ref 5–12)
LYMPHOCYTES NFR BLD MANUAL: 1 % (ref 5–12)
LYMPHOCYTES NFR BLD MANUAL: 10 % (ref 5–12)
LYMPHOCYTES NFR BLD MANUAL: 11 % (ref 5–12)
LYMPHOCYTES NFR BLD MANUAL: 14 % (ref 5–12)
LYMPHOCYTES NFR BLD MANUAL: 14 % (ref 5–12)
LYMPHOCYTES NFR BLD MANUAL: 15 % (ref 5–12)
LYMPHOCYTES NFR BLD MANUAL: 16 % (ref 5–12)
LYMPHOCYTES NFR BLD MANUAL: 18 % (ref 5–12)
LYMPHOCYTES NFR BLD MANUAL: 2 % (ref 5–12)
LYMPHOCYTES NFR BLD MANUAL: 24 % (ref 5–12)
LYMPHOCYTES NFR BLD MANUAL: 3 % (ref 5–12)
LYMPHOCYTES NFR BLD MANUAL: 3 % (ref 5–12)
LYMPHOCYTES NFR BLD MANUAL: 4 % (ref 5–12)
LYMPHOCYTES NFR BLD MANUAL: 5 % (ref 5–12)
LYMPHOCYTES NFR BLD MANUAL: 5 % (ref 5–12)
LYMPHOCYTES NFR BLD MANUAL: 53 % (ref 19.6–45.3)
LYMPHOCYTES NFR BLD MANUAL: 6 % (ref 5–12)
LYMPHOCYTES NFR BLD MANUAL: 6 % (ref 5–12)
LYMPHOCYTES NFR BLD MANUAL: 63 % (ref 19.6–45.3)
LYMPHOCYTES NFR BLD MANUAL: 69 % (ref 19.6–45.3)
LYMPHOCYTES NFR BLD MANUAL: 71 % (ref 19.6–45.3)
LYMPHOCYTES NFR BLD MANUAL: 72 % (ref 19.6–45.3)
LYMPHOCYTES NFR BLD MANUAL: 76 % (ref 19.6–45.3)
LYMPHOCYTES NFR BLD MANUAL: 76 % (ref 19.6–45.3)
LYMPHOCYTES NFR BLD MANUAL: 77 % (ref 19.6–45.3)
LYMPHOCYTES NFR BLD MANUAL: 78 % (ref 19.6–45.3)
LYMPHOCYTES NFR BLD MANUAL: 79 % (ref 19.6–45.3)
LYMPHOCYTES NFR BLD MANUAL: 8 % (ref 5–12)
LYMPHOCYTES NFR BLD MANUAL: 80 % (ref 19.6–45.3)
LYMPHOCYTES NFR BLD MANUAL: 81 % (ref 19.6–45.3)
LYMPHOCYTES NFR BLD MANUAL: 82 % (ref 19.6–45.3)
LYMPHOCYTES NFR BLD MANUAL: 84 % (ref 19.6–45.3)
LYMPHOCYTES NFR BLD MANUAL: 86 % (ref 19.6–45.3)
LYMPHOCYTES NFR BLD MANUAL: 88 % (ref 19.6–45.3)
LYMPHOCYTES NFR BLD MANUAL: 89 % (ref 19.6–45.3)
LYMPHOCYTES NFR BLD MANUAL: 92 % (ref 19.6–45.3)
LYMPHOCYTES NFR BLD MANUAL: 92 % (ref 19.6–45.3)
LYMPHOCYTES NFR BLD MANUAL: 94 % (ref 19.6–45.3)
LYMPHOCYTES NFR BLD MANUAL: 94 % (ref 19.6–45.3)
LYMPHOCYTES NFR BLD MANUAL: 96 % (ref 19.6–45.3)
LYMPHOCYTES NFR BLD MANUAL: 98 % (ref 19.6–45.3)
LYMPHOCYTES NFR BLD MANUAL: 98 % (ref 19.6–45.3)
LYMPHOCYTES NFR BLD MANUAL: 99 % (ref 19.6–45.3)
LYMPHOCYTES NFR BLD MANUAL: 99 % (ref 19.6–45.3)
M PNEUMO IGG SER IA-ACNC: NOT DETECTED
M PNEUMO IGG SER IA-ACNC: NOT DETECTED
M PNEUMO IGM SER QL: NEGATIVE
MACROCYTES BLD QL SMEAR: ABNORMAL
MACROCYTES BLD QL SMEAR: NORMAL
MACROCYTES BLD QL SMEAR: NORMAL
MAGNESIUM SERPL-MCNC: 1.4 MG/DL (ref 1.6–2.4)
MAGNESIUM SERPL-MCNC: 1.5 MG/DL (ref 1.6–2.4)
MAGNESIUM SERPL-MCNC: 1.5 MG/DL (ref 1.6–2.4)
MAGNESIUM SERPL-MCNC: 1.6 MG/DL (ref 1.6–2.4)
MAGNESIUM SERPL-MCNC: 2.5 MG/DL (ref 1.6–2.4)
MAGNESIUM SERPL-MCNC: 2.6 MG/DL (ref 1.6–2.4)
MAGNESIUM SERPL-MCNC: 2.6 MG/DL (ref 1.6–2.4)
MAGNESIUM SERPL-MCNC: 2.7 MG/DL (ref 1.6–2.4)
MCH RBC QN AUTO: 30 PG (ref 26.6–33)
MCH RBC QN AUTO: 30.1 PG (ref 26.6–33)
MCH RBC QN AUTO: 30.6 PG (ref 26.6–33)
MCH RBC QN AUTO: 30.7 PG (ref 26.6–33)
MCH RBC QN AUTO: 30.7 PG (ref 26.6–33)
MCH RBC QN AUTO: 30.8 PG (ref 26.6–33)
MCH RBC QN AUTO: 30.8 PG (ref 26.6–33)
MCH RBC QN AUTO: 30.9 PG (ref 26.6–33)
MCH RBC QN AUTO: 31 PG (ref 26.6–33)
MCH RBC QN AUTO: 31.1 PG (ref 26.6–33)
MCH RBC QN AUTO: 31.2 PG (ref 26.6–33)
MCH RBC QN AUTO: 31.2 PG (ref 26.6–33)
MCH RBC QN AUTO: 31.3 PG (ref 26.6–33)
MCH RBC QN AUTO: 31.3 PG (ref 26.6–33)
MCH RBC QN AUTO: 31.4 PG (ref 26.6–33)
MCH RBC QN AUTO: 31.5 PG (ref 26.6–33)
MCH RBC QN AUTO: 31.6 PG (ref 26.6–33)
MCH RBC QN AUTO: 31.6 PG (ref 26.6–33)
MCH RBC QN AUTO: 32.1 PG (ref 26.6–33)
MCH RBC QN AUTO: 32.6 PG (ref 26.6–33)
MCH RBC QN AUTO: 32.8 PG (ref 26.6–33)
MCH RBC QN AUTO: 32.9 PG (ref 26.6–33)
MCH RBC QN AUTO: 33.2 PG (ref 26.6–33)
MCH RBC QN AUTO: 33.5 PG (ref 26.6–33)
MCH RBC QN AUTO: 34.1 PG (ref 26.6–33)
MCH RBC QN AUTO: 34.1 PG (ref 26.6–33)
MCH RBC QN AUTO: 35.6 PG (ref 26.6–33)
MCH RBC QN AUTO: 35.6 PG (ref 26.6–33)
MCHC RBC AUTO-ENTMCNC: 29.3 G/DL (ref 31.5–35.7)
MCHC RBC AUTO-ENTMCNC: 29.3 G/DL (ref 31.5–35.7)
MCHC RBC AUTO-ENTMCNC: 29.5 G/DL (ref 31.5–35.7)
MCHC RBC AUTO-ENTMCNC: 29.9 G/DL (ref 31.5–35.7)
MCHC RBC AUTO-ENTMCNC: 30 G/DL (ref 31.5–35.7)
MCHC RBC AUTO-ENTMCNC: 30.2 G/DL (ref 31.5–35.7)
MCHC RBC AUTO-ENTMCNC: 30.3 G/DL (ref 31.5–35.7)
MCHC RBC AUTO-ENTMCNC: 30.4 G/DL (ref 31.5–35.7)
MCHC RBC AUTO-ENTMCNC: 30.5 G/DL (ref 31.5–35.7)
MCHC RBC AUTO-ENTMCNC: 30.6 G/DL (ref 31.5–35.7)
MCHC RBC AUTO-ENTMCNC: 30.7 G/DL (ref 31.5–35.7)
MCHC RBC AUTO-ENTMCNC: 30.7 G/DL (ref 31.5–35.7)
MCHC RBC AUTO-ENTMCNC: 31 G/DL (ref 31.5–35.7)
MCHC RBC AUTO-ENTMCNC: 31 G/DL (ref 31.5–35.7)
MCHC RBC AUTO-ENTMCNC: 31.1 G/DL (ref 31.5–35.7)
MCHC RBC AUTO-ENTMCNC: 31.2 G/DL (ref 31.5–35.7)
MCHC RBC AUTO-ENTMCNC: 31.3 G/DL (ref 31.5–35.7)
MCHC RBC AUTO-ENTMCNC: 31.4 G/DL (ref 31.5–35.7)
MCHC RBC AUTO-ENTMCNC: 31.5 G/DL (ref 31.5–35.7)
MCHC RBC AUTO-ENTMCNC: 31.6 G/DL (ref 31.5–35.7)
MCHC RBC AUTO-ENTMCNC: 31.7 G/DL (ref 31.5–35.7)
MCHC RBC AUTO-ENTMCNC: 31.9 G/DL (ref 31.5–35.7)
MCHC RBC AUTO-ENTMCNC: 31.9 G/DL (ref 31.5–35.7)
MCHC RBC AUTO-ENTMCNC: 32 G/DL (ref 31.5–35.7)
MCHC RBC AUTO-ENTMCNC: 32 G/DL (ref 31.5–35.7)
MCHC RBC AUTO-ENTMCNC: 32.2 G/DL (ref 31.5–35.7)
MCHC RBC AUTO-ENTMCNC: 32.5 G/DL (ref 31.5–35.7)
MCHC RBC AUTO-ENTMCNC: 32.5 G/DL (ref 31.5–35.7)
MCHC RBC AUTO-ENTMCNC: 32.6 G/DL (ref 31.5–35.7)
MCHC RBC AUTO-ENTMCNC: 32.6 G/DL (ref 31.5–35.7)
MCHC RBC AUTO-ENTMCNC: 32.7 G/DL (ref 31.5–35.7)
MCHC RBC AUTO-ENTMCNC: 32.9 G/DL (ref 31.5–35.7)
MCHC RBC AUTO-ENTMCNC: 33.2 G/DL (ref 31.5–35.7)
MCHC RBC AUTO-ENTMCNC: 33.2 G/DL (ref 31.5–35.7)
MCV RBC AUTO: 100.4 FL (ref 79–97)
MCV RBC AUTO: 100.4 FL (ref 79–97)
MCV RBC AUTO: 101.7 FL (ref 79–97)
MCV RBC AUTO: 102.7 FL (ref 79–97)
MCV RBC AUTO: 105.9 FL (ref 79–97)
MCV RBC AUTO: 106.6 FL (ref 79–97)
MCV RBC AUTO: 106.7 FL (ref 79–97)
MCV RBC AUTO: 108.1 FL (ref 79–97)
MCV RBC AUTO: 109.1 FL (ref 79–97)
MCV RBC AUTO: 109.6 FL (ref 79–97)
MCV RBC AUTO: 109.9 FL (ref 79–97)
MCV RBC AUTO: 110.9 FL (ref 79–97)
MCV RBC AUTO: 111.4 FL (ref 79–97)
MCV RBC AUTO: 113.1 FL (ref 79–97)
MCV RBC AUTO: 113.1 FL (ref 79–97)
MCV RBC AUTO: 113.7 FL (ref 79–97)
MCV RBC AUTO: 114.9 FL (ref 79–97)
MCV RBC AUTO: 94.1 FL (ref 79–97)
MCV RBC AUTO: 94.3 FL (ref 79–97)
MCV RBC AUTO: 94.4 FL (ref 79–97)
MCV RBC AUTO: 94.8 FL (ref 79–97)
MCV RBC AUTO: 94.9 FL (ref 79–97)
MCV RBC AUTO: 95 FL (ref 79–97)
MCV RBC AUTO: 95 FL (ref 79–97)
MCV RBC AUTO: 95.1 FL (ref 79–97)
MCV RBC AUTO: 95.1 FL (ref 79–97)
MCV RBC AUTO: 95.2 FL (ref 79–97)
MCV RBC AUTO: 95.5 FL (ref 79–97)
MCV RBC AUTO: 95.8 FL (ref 79–97)
MCV RBC AUTO: 96.1 FL (ref 79–97)
MCV RBC AUTO: 96.2 FL (ref 79–97)
MCV RBC AUTO: 96.2 FL (ref 79–97)
MCV RBC AUTO: 97 FL (ref 79–97)
MCV RBC AUTO: 97.7 FL (ref 79–97)
MCV RBC AUTO: 98.2 FL (ref 79–97)
MCV RBC AUTO: 98.2 FL (ref 79–97)
MCV RBC AUTO: 98.3 FL (ref 79–97)
MCV RBC AUTO: 98.4 FL (ref 79–97)
MONOCYTES # BLD AUTO: 0.01 10*3/MM3 (ref 0.1–0.9)
MONOCYTES # BLD AUTO: 0.01 10*3/MM3 (ref 0.1–0.9)
MONOCYTES # BLD AUTO: 0.02 10*3/MM3 (ref 0.1–0.9)
MONOCYTES # BLD AUTO: 0.04 10*3/MM3 (ref 0.1–0.9)
MONOCYTES # BLD AUTO: 0.06 10*3/MM3 (ref 0.1–0.9)
MONOCYTES # BLD AUTO: 0.07 10*3/MM3 (ref 0.1–0.9)
MONOCYTES # BLD AUTO: 0.09 10*3/MM3 (ref 0.1–0.9)
MONOCYTES # BLD AUTO: 0.1 10*3/MM3 (ref 0.1–0.9)
MONOCYTES # BLD AUTO: 0.1 10*3/MM3 (ref 0.1–0.9)
MONOCYTES # BLD AUTO: 0.11 10*3/MM3 (ref 0.1–0.9)
MONOCYTES # BLD AUTO: 0.12 10*3/MM3 (ref 0.1–0.9)
MONOCYTES # BLD AUTO: 0.12 10*3/MM3 (ref 0.1–0.9)
MONOCYTES # BLD AUTO: 0.15 10*3/MM3 (ref 0.1–0.9)
MONOCYTES # BLD AUTO: 0.25 10*3/MM3 (ref 0.1–0.9)
MONOCYTES # BLD AUTO: 0.25 10*3/MM3 (ref 0.1–0.9)
MONOCYTES # BLD AUTO: 0.26 10*3/MM3 (ref 0.1–0.9)
MONOCYTES # BLD AUTO: 0.29 10*3/MM3 (ref 0.1–0.9)
MONOCYTES # BLD AUTO: 0.33 10*3/MM3 (ref 0.1–0.9)
MONOCYTES # BLD AUTO: 0.33 10*3/MM3 (ref 0.1–0.9)
MONOCYTES # BLD AUTO: 0.34 10*3/MM3 (ref 0.1–0.9)
MONOCYTES # BLD AUTO: 0.44 10*3/MM3 (ref 0.1–0.9)
MONOCYTES # BLD AUTO: 0.44 10*3/MM3 (ref 0.1–0.9)
MONOCYTES # BLD AUTO: 0.45 10*3/MM3 (ref 0.1–0.9)
MONOCYTES NFR BLD AUTO: 14.9 % (ref 5–12)
MONOCYTES NFR BLD AUTO: 6.2 % (ref 5–12)
MRSA DNA SPEC QL NAA+PROBE: NEGATIVE
MRSA DNA SPEC QL NAA+PROBE: NEGATIVE
NEUTROPHILS # BLD AUTO: 0 10*3/MM3 (ref 1.7–7)
NEUTROPHILS # BLD AUTO: 0.01 10*3/MM3 (ref 1.7–7)
NEUTROPHILS # BLD AUTO: 0.02 10*3/MM3 (ref 1.7–7)
NEUTROPHILS # BLD AUTO: 0.03 10*3/MM3 (ref 1.7–7)
NEUTROPHILS # BLD AUTO: 0.03 10*3/MM3 (ref 1.7–7)
NEUTROPHILS # BLD AUTO: 0.04 10*3/MM3 (ref 1.7–7)
NEUTROPHILS # BLD AUTO: 0.05 10*3/MM3 (ref 1.7–7)
NEUTROPHILS # BLD AUTO: 0.05 10*3/MM3 (ref 1.7–7)
NEUTROPHILS # BLD AUTO: 0.06 10*3/MM3 (ref 1.7–7)
NEUTROPHILS # BLD AUTO: 0.09 10*3/MM3 (ref 1.7–7)
NEUTROPHILS # BLD AUTO: 0.09 10*3/MM3 (ref 1.7–7)
NEUTROPHILS # BLD AUTO: 0.11 10*3/MM3 (ref 1.7–7)
NEUTROPHILS # BLD AUTO: 0.12 10*3/MM3 (ref 1.7–7)
NEUTROPHILS # BLD AUTO: 0.12 10*3/MM3 (ref 1.7–7)
NEUTROPHILS # BLD AUTO: 0.13 10*3/MM3 (ref 1.7–7)
NEUTROPHILS # BLD AUTO: 0.16 10*3/MM3 (ref 1.7–7)
NEUTROPHILS # BLD AUTO: 0.18 10*3/MM3 (ref 1.7–7)
NEUTROPHILS # BLD AUTO: 0.19 10*3/MM3 (ref 1.7–7)
NEUTROPHILS # BLD AUTO: 0.3 10*3/MM3 (ref 1.7–7)
NEUTROPHILS # BLD AUTO: 1.14 10*3/MM3 (ref 1.7–7)
NEUTROPHILS # BLD AUTO: 1.57 10*3/MM3 (ref 1.7–7)
NEUTROPHILS NFR BLD AUTO: 2.4 % (ref 42.7–76)
NEUTROPHILS NFR BLD AUTO: 9.4 % (ref 42.7–76)
NEUTROPHILS NFR BLD MANUAL: 0 % (ref 42.7–76)
NEUTROPHILS NFR BLD MANUAL: 1 % (ref 42.7–76)
NEUTROPHILS NFR BLD MANUAL: 10 % (ref 42.7–76)
NEUTROPHILS NFR BLD MANUAL: 12 % (ref 42.7–76)
NEUTROPHILS NFR BLD MANUAL: 13 % (ref 42.7–76)
NEUTROPHILS NFR BLD MANUAL: 2 % (ref 42.7–76)
NEUTROPHILS NFR BLD MANUAL: 28 % (ref 42.7–76)
NEUTROPHILS NFR BLD MANUAL: 3 % (ref 42.7–76)
NEUTROPHILS NFR BLD MANUAL: 36 % (ref 42.7–76)
NEUTROPHILS NFR BLD MANUAL: 4 % (ref 42.7–76)
NEUTROPHILS NFR BLD MANUAL: 5 % (ref 42.7–76)
NEUTROPHILS NFR BLD MANUAL: 5 % (ref 42.7–76)
NEUTROPHILS NFR BLD MANUAL: 6 % (ref 42.7–76)
NEUTROPHILS NFR BLD MANUAL: 7 % (ref 42.7–76)
NEUTROPHILS NFR BLD MANUAL: 8 % (ref 42.7–76)
NEUTROPHILS NFR BLD MANUAL: 9 % (ref 42.7–76)
NEUTS BAND NFR BLD MANUAL: 1 % (ref 0–5)
NEUTS BAND NFR BLD MANUAL: 1 % (ref 0–5)
NITRITE UR QL STRIP: NEGATIVE
NITRITE UR QL STRIP: POSITIVE
NITRITE UR QL STRIP: POSITIVE
NRBC BLD AUTO-RTO: 1.2 /100 WBC (ref 0–0.2)
NRBC SPEC MANUAL: 1 /100 WBC (ref 0–0.2)
NRBC SPEC MANUAL: 10 /100 WBC (ref 0–0.2)
NRBC SPEC MANUAL: 2 /100 WBC (ref 0–0.2)
NRBC SPEC MANUAL: 4 /100 WBC (ref 0–0.2)
NRBC SPEC MANUAL: 6 /100 WBC (ref 0–0.2)
NRBC SPEC MANUAL: 9 /100 WBC (ref 0–0.2)
OTHER CELLS %: 2 % (ref 0–0)
OTHER CELLS %: 4 % (ref 0–0)
OTHER CELLS %: 7 % (ref 0–0)
OVALOCYTES BLD QL SMEAR: ABNORMAL
OVALOCYTES BLD QL SMEAR: NORMAL
PH UR STRIP.AUTO: 5.5 [PH] (ref 5–8)
PH UR STRIP.AUTO: 6 [PH] (ref 5–8)
PH UR STRIP.AUTO: 7 [PH] (ref 5–8)
PHOSPHATE SERPL-MCNC: 2.8 MG/DL (ref 2.5–4.5)
PHOSPHATE SERPL-MCNC: 3 MG/DL (ref 2.5–4.5)
PHOSPHATE SERPL-MCNC: 3.4 MG/DL (ref 2.5–4.5)
PHOSPHATE SERPL-MCNC: 3.6 MG/DL (ref 2.5–4.5)
PHOSPHATE SERPL-MCNC: 3.7 MG/DL (ref 2.5–4.5)
PLAT MORPH BLD: NORMAL
PLATELET # BLD AUTO: 12 10*3/MM3 (ref 140–450)
PLATELET # BLD AUTO: 12 10*3/MM3 (ref 140–450)
PLATELET # BLD AUTO: 129 10*3/MM3 (ref 140–450)
PLATELET # BLD AUTO: 13 10*3/MM3 (ref 140–450)
PLATELET # BLD AUTO: 134 10*3/MM3 (ref 140–450)
PLATELET # BLD AUTO: 16 10*3/MM3 (ref 140–450)
PLATELET # BLD AUTO: 18 10*3/MM3 (ref 140–450)
PLATELET # BLD AUTO: 19 10*3/MM3 (ref 140–450)
PLATELET # BLD AUTO: 20 10*3/MM3 (ref 140–450)
PLATELET # BLD AUTO: 22 10*3/MM3 (ref 140–450)
PLATELET # BLD AUTO: 22 10*3/MM3 (ref 140–450)
PLATELET # BLD AUTO: 23 10*3/MM3 (ref 140–450)
PLATELET # BLD AUTO: 24 10*3/MM3 (ref 140–450)
PLATELET # BLD AUTO: 28 10*3/MM3 (ref 140–450)
PLATELET # BLD AUTO: 28 10*3/MM3 (ref 140–450)
PLATELET # BLD AUTO: 30 10*3/MM3 (ref 140–450)
PLATELET # BLD AUTO: 30 10*3/MM3 (ref 140–450)
PLATELET # BLD AUTO: 34 10*3/MM3 (ref 140–450)
PLATELET # BLD AUTO: 36 10*3/MM3 (ref 140–450)
PLATELET # BLD AUTO: 38 10*3/MM3 (ref 140–450)
PLATELET # BLD AUTO: 43 10*3/MM3 (ref 140–450)
PLATELET # BLD AUTO: 44 10*3/MM3 (ref 140–450)
PLATELET # BLD AUTO: 48 10*3/MM3 (ref 140–450)
PLATELET # BLD AUTO: 51 10*3/MM3 (ref 140–450)
PLATELET # BLD AUTO: 57 10*3/MM3 (ref 140–450)
PLATELET # BLD AUTO: 59 10*3/MM3 (ref 140–450)
PLATELET # BLD AUTO: 59 10*3/MM3 (ref 140–450)
PLATELET # BLD AUTO: 60 10*3/MM3 (ref 140–450)
PLATELET # BLD AUTO: 61 10*3/MM3 (ref 140–450)
PLATELET # BLD AUTO: 64 10*3/MM3 (ref 140–450)
PLATELET # BLD AUTO: 65 10*3/MM3 (ref 140–450)
PLATELET # BLD AUTO: 66 10*3/MM3 (ref 140–450)
PLATELET # BLD AUTO: 75 10*3/MM3 (ref 140–450)
PLATELET # BLD AUTO: 76 10*3/MM3 (ref 140–450)
PMV BLD AUTO: 10 FL (ref 6–12)
PMV BLD AUTO: 10.3 FL (ref 6–12)
PMV BLD AUTO: 10.5 FL (ref 6–12)
PMV BLD AUTO: 10.6 FL (ref 6–12)
PMV BLD AUTO: 11.2 FL (ref 6–12)
PMV BLD AUTO: 11.3 FL (ref 6–12)
PMV BLD AUTO: 11.5 FL (ref 6–12)
PMV BLD AUTO: 12 FL (ref 6–12)
PMV BLD AUTO: 12.1 FL (ref 6–12)
PMV BLD AUTO: 12.1 FL (ref 6–12)
PMV BLD AUTO: 12.2 FL (ref 6–12)
PMV BLD AUTO: 12.3 FL (ref 6–12)
PMV BLD AUTO: 12.4 FL (ref 6–12)
PMV BLD AUTO: 12.5 FL (ref 6–12)
PMV BLD AUTO: 13.3 FL (ref 6–12)
PMV BLD AUTO: 9 FL (ref 6–12)
PMV BLD AUTO: 9.4 FL (ref 6–12)
PMV BLD AUTO: 9.5 FL (ref 6–12)
PMV BLD AUTO: 9.5 FL (ref 6–12)
PMV BLD AUTO: 9.7 FL (ref 6–12)
PMV BLD AUTO: 9.8 FL (ref 6–12)
PMV BLD AUTO: ABNORMAL FL
POIKILOCYTOSIS BLD QL SMEAR: ABNORMAL
POLYCHROMASIA BLD QL SMEAR: ABNORMAL
POTASSIUM BLD-SCNC: 3.4 MMOL/L (ref 3.5–5.2)
POTASSIUM BLD-SCNC: 3.5 MMOL/L (ref 3.5–5.2)
POTASSIUM BLD-SCNC: 3.6 MMOL/L (ref 3.5–5.2)
POTASSIUM BLD-SCNC: 3.7 MMOL/L (ref 3.5–5.2)
POTASSIUM BLD-SCNC: 3.9 MMOL/L (ref 3.5–5.2)
POTASSIUM BLD-SCNC: 3.9 MMOL/L (ref 3.5–5.2)
POTASSIUM BLD-SCNC: 4 MMOL/L (ref 3.5–5.2)
POTASSIUM BLD-SCNC: 4 MMOL/L (ref 3.5–5.2)
POTASSIUM BLD-SCNC: 4.2 MMOL/L (ref 3.5–5.2)
POTASSIUM BLD-SCNC: 4.3 MMOL/L (ref 3.5–5.2)
POTASSIUM BLD-SCNC: 4.4 MMOL/L (ref 3.5–5.2)
POTASSIUM BLD-SCNC: 4.4 MMOL/L (ref 3.5–5.2)
POTASSIUM BLD-SCNC: 4.8 MMOL/L (ref 3.5–5.2)
PROCALCITONIN SERPL-MCNC: 0.1 NG/ML (ref 0.1–0.25)
PROT SERPL-MCNC: 6.2 G/DL (ref 6–8.5)
PROT SERPL-MCNC: 6.3 G/DL (ref 6–8.5)
PROT SERPL-MCNC: 6.5 G/DL (ref 6–8.5)
PROT SERPL-MCNC: 6.6 G/DL (ref 6–8.5)
PROT SERPL-MCNC: 6.7 G/DL (ref 6–8.5)
PROT SERPL-MCNC: 6.7 G/DL (ref 6–8.5)
PROT SERPL-MCNC: 6.8 G/DL (ref 6–8.5)
PROT SERPL-MCNC: 7 G/DL (ref 6–8.5)
PROT SERPL-MCNC: 7.1 G/DL (ref 6–8.5)
PROT SERPL-MCNC: 7.6 G/DL (ref 6–8.5)
PROT SERPL-MCNC: 7.8 G/DL (ref 6–8.5)
PROT SERPL-MCNC: 7.9 G/DL (ref 6–8.5)
PROT SERPL-MCNC: 8 G/DL (ref 6–8.5)
PROT SERPL-MCNC: 8.1 G/DL (ref 6–8.5)
PROT UR QL STRIP: ABNORMAL
RBC # BLD AUTO: 2.11 10*6/MM3 (ref 3.77–5.28)
RBC # BLD AUTO: 2.16 10*6/MM3 (ref 3.77–5.28)
RBC # BLD AUTO: 2.21 10*6/MM3 (ref 3.77–5.28)
RBC # BLD AUTO: 2.21 10*6/MM3 (ref 3.77–5.28)
RBC # BLD AUTO: 2.23 10*6/MM3 (ref 3.77–5.28)
RBC # BLD AUTO: 2.29 10*6/MM3 (ref 3.77–5.28)
RBC # BLD AUTO: 2.29 10*6/MM3 (ref 3.77–5.28)
RBC # BLD AUTO: 2.3 10*6/MM3 (ref 3.77–5.28)
RBC # BLD AUTO: 2.32 10*6/MM3 (ref 3.77–5.28)
RBC # BLD AUTO: 2.34 10*6/MM3 (ref 3.77–5.28)
RBC # BLD AUTO: 2.34 10*6/MM3 (ref 3.77–5.28)
RBC # BLD AUTO: 2.41 10*6/MM3 (ref 3.77–5.28)
RBC # BLD AUTO: 2.44 10*6/MM3 (ref 3.77–5.28)
RBC # BLD AUTO: 2.44 10*6/MM3 (ref 3.77–5.28)
RBC # BLD AUTO: 2.45 10*6/MM3 (ref 3.77–5.28)
RBC # BLD AUTO: 2.46 10*6/MM3 (ref 3.77–5.28)
RBC # BLD AUTO: 2.51 10*6/MM3 (ref 3.77–5.28)
RBC # BLD AUTO: 2.51 10*6/MM3 (ref 3.77–5.28)
RBC # BLD AUTO: 2.54 10*6/MM3 (ref 3.77–5.28)
RBC # BLD AUTO: 2.55 10*6/MM3 (ref 3.77–5.28)
RBC # BLD AUTO: 2.66 10*6/MM3 (ref 3.77–5.28)
RBC # BLD AUTO: 2.66 10*6/MM3 (ref 3.77–5.28)
RBC # BLD AUTO: 2.69 10*6/MM3 (ref 3.77–5.28)
RBC # BLD AUTO: 2.7 10*6/MM3 (ref 3.77–5.28)
RBC # BLD AUTO: 2.74 10*6/MM3 (ref 3.77–5.28)
RBC # BLD AUTO: 2.75 10*6/MM3 (ref 3.77–5.28)
RBC # BLD AUTO: 2.77 10*6/MM3 (ref 3.77–5.28)
RBC # BLD AUTO: 2.79 10*6/MM3 (ref 3.77–5.28)
RBC # BLD AUTO: 2.81 10*6/MM3 (ref 3.77–5.28)
RBC # BLD AUTO: 2.81 10*6/MM3 (ref 3.77–5.28)
RBC # BLD AUTO: 2.82 10*6/MM3 (ref 3.77–5.28)
RBC # BLD AUTO: 2.85 10*6/MM3 (ref 3.77–5.28)
RBC # BLD AUTO: 2.89 10*6/MM3 (ref 3.77–5.28)
RBC # BLD AUTO: 2.98 10*6/MM3 (ref 3.77–5.28)
RBC # BLD AUTO: 3 10*6/MM3 (ref 3.77–5.28)
RBC # BLD AUTO: 3.04 10*6/MM3 (ref 3.77–5.28)
RBC # BLD AUTO: 3.06 10*6/MM3 (ref 3.77–5.28)
RBC # BLD AUTO: 3.13 10*6/MM3 (ref 3.77–5.28)
RBC # UR: ABNORMAL /HPF
REF LAB TEST METHOD: ABNORMAL
RH BLD: POSITIVE
RHINOVIRUS RNA SPEC NAA+PROBE: NOT DETECTED
RHINOVIRUS RNA SPEC NAA+PROBE: NOT DETECTED
RSV RNA NPH QL NAA+NON-PROBE: NOT DETECTED
RSV RNA NPH QL NAA+NON-PROBE: NOT DETECTED
S AUREUS DNA SPEC QL NAA+PROBE: NEGATIVE
S AUREUS DNA SPEC QL NAA+PROBE: NEGATIVE
S PNEUM AG SPEC QL LA: NEGATIVE
SCAN SLIDE: NORMAL
SCHISTOCYTES BLD QL SMEAR: ABNORMAL
SCHISTOCYTES BLD QL SMEAR: NORMAL
SMALL PLATELETS BLD QL SMEAR: ABNORMAL
SMALL PLATELETS BLD QL SMEAR: NORMAL
SMALL PLATELETS BLD QL SMEAR: NORMAL
SODIUM BLD-SCNC: 131 MMOL/L (ref 136–145)
SODIUM BLD-SCNC: 135 MMOL/L (ref 136–145)
SODIUM BLD-SCNC: 136 MMOL/L (ref 136–145)
SODIUM BLD-SCNC: 137 MMOL/L (ref 136–145)
SODIUM BLD-SCNC: 138 MMOL/L (ref 136–145)
SODIUM BLD-SCNC: 138 MMOL/L (ref 136–145)
SODIUM BLD-SCNC: 139 MMOL/L (ref 136–145)
SODIUM BLD-SCNC: 139 MMOL/L (ref 136–145)
SODIUM BLD-SCNC: 140 MMOL/L (ref 136–145)
SODIUM BLD-SCNC: 141 MMOL/L (ref 136–145)
SODIUM BLD-SCNC: 141 MMOL/L (ref 136–145)
SODIUM BLD-SCNC: 142 MMOL/L (ref 136–145)
SP GR UR STRIP: 1.01 (ref 1–1.03)
SP GR UR STRIP: 1.02 (ref 1–1.03)
SP GR UR STRIP: 1.03 (ref 1–1.03)
SQUAMOUS #/AREA URNS HPF: ABNORMAL /HPF
STOMATOCYTES BLD QL SMEAR: ABNORMAL
T&S EXPIRATION DATE: NORMAL
TARGETS BLD QL SMEAR: ABNORMAL
TIBC SERPL-MCNC: 285 MCG/DL (ref 298–536)
TRANSFERRIN SERPL-MCNC: 191 MG/DL (ref 200–360)
TROPONIN T SERPL-MCNC: <0.01 NG/ML (ref 0–0.03)
TSH SERPL DL<=0.05 MIU/L-ACNC: 1.56 MIU/ML (ref 0.27–4.2)
UNIT  ABO: NORMAL
UNIT  RH: NORMAL
URATE SERPL-MCNC: 0.3 MG/DL (ref 2.4–5.7)
URATE SERPL-MCNC: 3.5 MG/DL (ref 2.4–5.7)
URATE SERPL-MCNC: <0.2 MG/DL (ref 2.4–5.7)
URATE SERPL-MCNC: <0.2 MG/DL (ref 2.4–5.7)
UROBILINOGEN UR QL STRIP: ABNORMAL
VIT B12 BLD-MCNC: <150 PG/ML (ref 211–946)
WBC NRBC COR # BLD: 0.6 10*3/MM3 (ref 3.4–10.8)
WBC NRBC COR # BLD: 0.78 10*3/MM3 (ref 3.4–10.8)
WBC NRBC COR # BLD: 0.94 10*3/MM3 (ref 3.4–10.8)
WBC NRBC COR # BLD: 0.96 10*3/MM3 (ref 3.4–10.8)
WBC NRBC COR # BLD: 1.04 10*3/MM3 (ref 3.4–10.8)
WBC NRBC COR # BLD: 1.13 10*3/MM3 (ref 3.4–10.8)
WBC NRBC COR # BLD: 1.15 10*3/MM3 (ref 3.4–10.8)
WBC NRBC COR # BLD: 1.16 10*3/MM3 (ref 3.4–10.8)
WBC NRBC COR # BLD: 1.22 10*3/MM3 (ref 3.4–10.8)
WBC NRBC COR # BLD: 1.23 10*3/MM3 (ref 3.4–10.8)
WBC NRBC COR # BLD: 1.26 10*3/MM3 (ref 3.4–10.8)
WBC NRBC COR # BLD: 1.37 10*3/MM3 (ref 3.4–10.8)
WBC NRBC COR # BLD: 1.39 10*3/MM3 (ref 3.4–10.8)
WBC NRBC COR # BLD: 1.43 10*3/MM3 (ref 3.4–10.8)
WBC NRBC COR # BLD: 1.47 10*3/MM3 (ref 3.4–10.8)
WBC NRBC COR # BLD: 1.48 10*3/MM3 (ref 3.4–10.8)
WBC NRBC COR # BLD: 1.54 10*3/MM3 (ref 3.4–10.8)
WBC NRBC COR # BLD: 1.54 10*3/MM3 (ref 3.4–10.8)
WBC NRBC COR # BLD: 1.59 10*3/MM3 (ref 3.4–10.8)
WBC NRBC COR # BLD: 1.6 10*3/MM3 (ref 3.4–10.8)
WBC NRBC COR # BLD: 1.62 10*3/MM3 (ref 3.4–10.8)
WBC NRBC COR # BLD: 1.77 10*3/MM3 (ref 3.4–10.8)
WBC NRBC COR # BLD: 1.78 10*3/MM3 (ref 3.4–10.8)
WBC NRBC COR # BLD: 1.86 10*3/MM3 (ref 3.4–10.8)
WBC NRBC COR # BLD: 1.88 10*3/MM3 (ref 3.4–10.8)
WBC NRBC COR # BLD: 1.91 10*3/MM3 (ref 3.4–10.8)
WBC NRBC COR # BLD: 1.93 10*3/MM3 (ref 3.4–10.8)
WBC NRBC COR # BLD: 1.94 10*3/MM3 (ref 3.4–10.8)
WBC NRBC COR # BLD: 2.06 10*3/MM3 (ref 3.4–10.8)
WBC NRBC COR # BLD: 2.07 10*3/MM3 (ref 3.4–10.8)
WBC NRBC COR # BLD: 2.18 10*3/MM3 (ref 3.4–10.8)
WBC NRBC COR # BLD: 2.25 10*3/MM3 (ref 3.4–10.8)
WBC NRBC COR # BLD: 2.27 10*3/MM3 (ref 3.4–10.8)
WBC NRBC COR # BLD: 2.28 10*3/MM3 (ref 3.4–10.8)
WBC NRBC COR # BLD: 2.35 10*3/MM3 (ref 3.4–10.8)
WBC NRBC COR # BLD: 2.74 10*3/MM3 (ref 3.4–10.8)
WBC NRBC COR # BLD: 4.07 10*3/MM3 (ref 3.4–10.8)
WBC NRBC COR # BLD: 4.36 10*3/MM3 (ref 3.4–10.8)
WBC UR QL AUTO: ABNORMAL /HPF
WHOLE BLOOD HOLD SPECIMEN: NORMAL

## 2019-01-01 PROCEDURE — 83605 ASSAY OF LACTIC ACID: CPT | Performed by: PHYSICIAN ASSISTANT

## 2019-01-01 PROCEDURE — 70450 CT HEAD/BRAIN W/O DYE: CPT | Performed by: RADIOLOGY

## 2019-01-01 PROCEDURE — 0099U HC BIOFIRE FILMARRAY RESP PANEL 1: CPT | Performed by: NURSE PRACTITIONER

## 2019-01-01 PROCEDURE — 86900 BLOOD TYPING SEROLOGIC ABO: CPT

## 2019-01-01 PROCEDURE — 99239 HOSP IP/OBS DSCHRG MGMT >30: CPT | Performed by: INTERNAL MEDICINE

## 2019-01-01 PROCEDURE — 99220 PR INITIAL OBSERVATION CARE/DAY 70 MINUTES: CPT | Performed by: INTERNAL MEDICINE

## 2019-01-01 PROCEDURE — 87040 BLOOD CULTURE FOR BACTERIA: CPT | Performed by: PHYSICIAN ASSISTANT

## 2019-01-01 PROCEDURE — 96361 HYDRATE IV INFUSION ADD-ON: CPT

## 2019-01-01 PROCEDURE — 85018 HEMOGLOBIN: CPT | Performed by: INTERNAL MEDICINE

## 2019-01-01 PROCEDURE — 87077 CULTURE AEROBIC IDENTIFY: CPT | Performed by: INTERNAL MEDICINE

## 2019-01-01 PROCEDURE — 63710000001 INSULIN ASPART PER 5 UNITS: Performed by: HOSPITALIST

## 2019-01-01 PROCEDURE — 80053 COMPREHEN METABOLIC PANEL: CPT | Performed by: INTERNAL MEDICINE

## 2019-01-01 PROCEDURE — 86850 RBC ANTIBODY SCREEN: CPT | Performed by: INTERNAL MEDICINE

## 2019-01-01 PROCEDURE — 82607 VITAMIN B-12: CPT | Performed by: HOSPITALIST

## 2019-01-01 PROCEDURE — 36415 COLL VENOUS BLD VENIPUNCTURE: CPT

## 2019-01-01 PROCEDURE — P9037 PLATE PHERES LEUKOREDU IRRAD: HCPCS

## 2019-01-01 PROCEDURE — 86920 COMPATIBILITY TEST SPIN: CPT

## 2019-01-01 PROCEDURE — 82962 GLUCOSE BLOOD TEST: CPT

## 2019-01-01 PROCEDURE — C1751 CATH, INF, PER/CENT/MIDLINE: HCPCS

## 2019-01-01 PROCEDURE — 80053 COMPREHEN METABOLIC PANEL: CPT | Performed by: PHYSICIAN ASSISTANT

## 2019-01-01 PROCEDURE — 71045 X-RAY EXAM CHEST 1 VIEW: CPT

## 2019-01-01 PROCEDURE — 86850 RBC ANTIBODY SCREEN: CPT

## 2019-01-01 PROCEDURE — 93010 ELECTROCARDIOGRAM REPORT: CPT | Performed by: INTERNAL MEDICINE

## 2019-01-01 PROCEDURE — 80048 BASIC METABOLIC PNL TOTAL CA: CPT | Performed by: INTERNAL MEDICINE

## 2019-01-01 PROCEDURE — 86900 BLOOD TYPING SEROLOGIC ABO: CPT | Performed by: INTERNAL MEDICINE

## 2019-01-01 PROCEDURE — 63710000001 INSULIN ASPART PER 5 UNITS: Performed by: INTERNAL MEDICINE

## 2019-01-01 PROCEDURE — P9040 RBC LEUKOREDUCED IRRADIATED: HCPCS

## 2019-01-01 PROCEDURE — 94799 UNLISTED PULMONARY SVC/PX: CPT

## 2019-01-01 PROCEDURE — 85007 BL SMEAR W/DIFF WBC COUNT: CPT | Performed by: INTERNAL MEDICINE

## 2019-01-01 PROCEDURE — 85025 COMPLETE CBC W/AUTO DIFF WBC: CPT | Performed by: NURSE PRACTITIONER

## 2019-01-01 PROCEDURE — 94640 AIRWAY INHALATION TREATMENT: CPT

## 2019-01-01 PROCEDURE — 85025 COMPLETE CBC W/AUTO DIFF WBC: CPT | Performed by: INTERNAL MEDICINE

## 2019-01-01 PROCEDURE — 96366 THER/PROPH/DIAG IV INF ADDON: CPT

## 2019-01-01 PROCEDURE — 99223 1ST HOSP IP/OBS HIGH 75: CPT | Performed by: HOSPITALIST

## 2019-01-01 PROCEDURE — 83540 ASSAY OF IRON: CPT | Performed by: HOSPITALIST

## 2019-01-01 PROCEDURE — 85007 BL SMEAR W/DIFF WBC COUNT: CPT | Performed by: EMERGENCY MEDICINE

## 2019-01-01 PROCEDURE — 86923 COMPATIBILITY TEST ELECTRIC: CPT

## 2019-01-01 PROCEDURE — 85007 BL SMEAR W/DIFF WBC COUNT: CPT | Performed by: HOSPITALIST

## 2019-01-01 PROCEDURE — 86850 RBC ANTIBODY SCREEN: CPT | Performed by: PHYSICIAN ASSISTANT

## 2019-01-01 PROCEDURE — 85049 AUTOMATED PLATELET COUNT: CPT | Performed by: INTERNAL MEDICINE

## 2019-01-01 PROCEDURE — 85007 BL SMEAR W/DIFF WBC COUNT: CPT | Performed by: PHYSICIAN ASSISTANT

## 2019-01-01 PROCEDURE — 99232 SBSQ HOSP IP/OBS MODERATE 35: CPT | Performed by: INTERNAL MEDICINE

## 2019-01-01 PROCEDURE — 86901 BLOOD TYPING SEROLOGIC RH(D): CPT | Performed by: INTERNAL MEDICINE

## 2019-01-01 PROCEDURE — 25010000002 VANCOMYCIN 5 G RECONSTITUTED SOLUTION 5,000 MG VIAL: Performed by: PHYSICIAN ASSISTANT

## 2019-01-01 PROCEDURE — 87186 SC STD MICRODIL/AGAR DIL: CPT | Performed by: HOSPITALIST

## 2019-01-01 PROCEDURE — G0108 DIAB MANAGE TRN  PER INDIV: HCPCS

## 2019-01-01 PROCEDURE — 25010000002 PIPERACILLIN-TAZOBACTAM: Performed by: INTERNAL MEDICINE

## 2019-01-01 PROCEDURE — 25010000002 CEFEPIME 2 G/NS 100 ML SOLUTION: Performed by: INTERNAL MEDICINE

## 2019-01-01 PROCEDURE — 85014 HEMATOCRIT: CPT | Performed by: INTERNAL MEDICINE

## 2019-01-01 PROCEDURE — 97116 GAIT TRAINING THERAPY: CPT

## 2019-01-01 PROCEDURE — 81003 URINALYSIS AUTO W/O SCOPE: CPT | Performed by: INTERNAL MEDICINE

## 2019-01-01 PROCEDURE — 83605 ASSAY OF LACTIC ACID: CPT | Performed by: HOSPITALIST

## 2019-01-01 PROCEDURE — 85025 COMPLETE CBC W/AUTO DIFF WBC: CPT | Performed by: PHYSICIAN ASSISTANT

## 2019-01-01 PROCEDURE — 86921 COMPATIBILITY TEST INCUBATE: CPT

## 2019-01-01 PROCEDURE — 85025 COMPLETE CBC W/AUTO DIFF WBC: CPT | Performed by: FAMILY MEDICINE

## 2019-01-01 PROCEDURE — 97166 OT EVAL MOD COMPLEX 45 MIN: CPT

## 2019-01-01 PROCEDURE — 87088 URINE BACTERIA CULTURE: CPT | Performed by: INTERNAL MEDICINE

## 2019-01-01 PROCEDURE — 84100 ASSAY OF PHOSPHORUS: CPT | Performed by: INTERNAL MEDICINE

## 2019-01-01 PROCEDURE — 87086 URINE CULTURE/COLONY COUNT: CPT | Performed by: INTERNAL MEDICINE

## 2019-01-01 PROCEDURE — 36430 TRANSFUSION BLD/BLD COMPNT: CPT

## 2019-01-01 PROCEDURE — 71046 X-RAY EXAM CHEST 2 VIEWS: CPT

## 2019-01-01 PROCEDURE — 86140 C-REACTIVE PROTEIN: CPT | Performed by: PHYSICIAN ASSISTANT

## 2019-01-01 PROCEDURE — 25010000002 MEROPENEM: Performed by: EMERGENCY MEDICINE

## 2019-01-01 PROCEDURE — 87205 SMEAR GRAM STAIN: CPT | Performed by: INTERNAL MEDICINE

## 2019-01-01 PROCEDURE — 83605 ASSAY OF LACTIC ACID: CPT | Performed by: INTERNAL MEDICINE

## 2019-01-01 PROCEDURE — 84484 ASSAY OF TROPONIN QUANT: CPT | Performed by: PHYSICIAN ASSISTANT

## 2019-01-01 PROCEDURE — 86140 C-REACTIVE PROTEIN: CPT | Performed by: HOSPITALIST

## 2019-01-01 PROCEDURE — 87040 BLOOD CULTURE FOR BACTERIA: CPT | Performed by: INTERNAL MEDICINE

## 2019-01-01 PROCEDURE — 25010000002 CEFTRIAXONE: Performed by: HOSPITALIST

## 2019-01-01 PROCEDURE — 85025 COMPLETE CBC W/AUTO DIFF WBC: CPT | Performed by: HOSPITALIST

## 2019-01-01 PROCEDURE — G0379 DIRECT REFER HOSPITAL OBSERV: HCPCS

## 2019-01-01 PROCEDURE — 84550 ASSAY OF BLOOD/URIC ACID: CPT | Performed by: INTERNAL MEDICINE

## 2019-01-01 PROCEDURE — 85025 COMPLETE CBC W/AUTO DIFF WBC: CPT | Performed by: EMERGENCY MEDICINE

## 2019-01-01 PROCEDURE — 81001 URINALYSIS AUTO W/SCOPE: CPT | Performed by: PHYSICIAN ASSISTANT

## 2019-01-01 PROCEDURE — 86901 BLOOD TYPING SEROLOGIC RH(D): CPT | Performed by: PHYSICIAN ASSISTANT

## 2019-01-01 PROCEDURE — 99283 EMERGENCY DEPT VISIT LOW MDM: CPT

## 2019-01-01 PROCEDURE — 99233 SBSQ HOSP IP/OBS HIGH 50: CPT | Performed by: INTERNAL MEDICINE

## 2019-01-01 PROCEDURE — 99217 PR OBSERVATION CARE DISCHARGE MANAGEMENT: CPT | Performed by: PHYSICIAN ASSISTANT

## 2019-01-01 PROCEDURE — 80053 COMPREHEN METABOLIC PANEL: CPT | Performed by: NURSE PRACTITIONER

## 2019-01-01 PROCEDURE — 83036 HEMOGLOBIN GLYCOSYLATED A1C: CPT | Performed by: NURSE PRACTITIONER

## 2019-01-01 PROCEDURE — 80053 COMPREHEN METABOLIC PANEL: CPT | Performed by: FAMILY MEDICINE

## 2019-01-01 PROCEDURE — 87640 STAPH A DNA AMP PROBE: CPT | Performed by: INTERNAL MEDICINE

## 2019-01-01 PROCEDURE — 25010000002 MAGNESIUM SULFATE 2 GM/50ML SOLUTION: Performed by: INTERNAL MEDICINE

## 2019-01-01 PROCEDURE — 63710000001 INSULIN ASPART PER 5 UNITS: Performed by: NURSE PRACTITIONER

## 2019-01-01 PROCEDURE — 81001 URINALYSIS AUTO W/SCOPE: CPT | Performed by: INTERNAL MEDICINE

## 2019-01-01 PROCEDURE — 85027 COMPLETE CBC AUTOMATED: CPT | Performed by: INTERNAL MEDICINE

## 2019-01-01 PROCEDURE — 87186 SC STD MICRODIL/AGAR DIL: CPT | Performed by: INTERNAL MEDICINE

## 2019-01-01 PROCEDURE — 87070 CULTURE OTHR SPECIMN AEROBIC: CPT | Performed by: INTERNAL MEDICINE

## 2019-01-01 PROCEDURE — 87086 URINE CULTURE/COLONY COUNT: CPT | Performed by: HOSPITALIST

## 2019-01-01 PROCEDURE — G0378 HOSPITAL OBSERVATION PER HR: HCPCS

## 2019-01-01 PROCEDURE — 71046 X-RAY EXAM CHEST 2 VIEWS: CPT | Performed by: RADIOLOGY

## 2019-01-01 PROCEDURE — 83735 ASSAY OF MAGNESIUM: CPT | Performed by: INTERNAL MEDICINE

## 2019-01-01 PROCEDURE — 72131 CT LUMBAR SPINE W/O DYE: CPT | Performed by: RADIOLOGY

## 2019-01-01 PROCEDURE — 72131 CT LUMBAR SPINE W/O DYE: CPT

## 2019-01-01 PROCEDURE — 87641 MR-STAPH DNA AMP PROBE: CPT | Performed by: INTERNAL MEDICINE

## 2019-01-01 PROCEDURE — 84484 ASSAY OF TROPONIN QUANT: CPT | Performed by: HOSPITALIST

## 2019-01-01 PROCEDURE — 82746 ASSAY OF FOLIC ACID SERUM: CPT | Performed by: HOSPITALIST

## 2019-01-01 PROCEDURE — A9270 NON-COVERED ITEM OR SERVICE: HCPCS | Performed by: INTERNAL MEDICINE

## 2019-01-01 PROCEDURE — 84466 ASSAY OF TRANSFERRIN: CPT | Performed by: HOSPITALIST

## 2019-01-01 PROCEDURE — 72192 CT PELVIS W/O DYE: CPT

## 2019-01-01 PROCEDURE — 87088 URINE BACTERIA CULTURE: CPT | Performed by: HOSPITALIST

## 2019-01-01 PROCEDURE — 87804 INFLUENZA ASSAY W/OPTIC: CPT

## 2019-01-01 PROCEDURE — 25010000002 PIPERACILLIN-TAZOBACTAM: Performed by: PHYSICIAN ASSISTANT

## 2019-01-01 PROCEDURE — 80053 COMPREHEN METABOLIC PANEL: CPT | Performed by: EMERGENCY MEDICINE

## 2019-01-01 PROCEDURE — 63710000001 ACETAMINOPHEN 325 MG TABLET: Performed by: INTERNAL MEDICINE

## 2019-01-01 PROCEDURE — 70450 CT HEAD/BRAIN W/O DYE: CPT

## 2019-01-01 PROCEDURE — 97162 PT EVAL MOD COMPLEX 30 MIN: CPT

## 2019-01-01 PROCEDURE — 85025 COMPLETE CBC W/AUTO DIFF WBC: CPT

## 2019-01-01 PROCEDURE — 87641 MR-STAPH DNA AMP PROBE: CPT | Performed by: NURSE PRACTITIONER

## 2019-01-01 PROCEDURE — 87899 AGENT NOS ASSAY W/OPTIC: CPT | Performed by: INTERNAL MEDICINE

## 2019-01-01 PROCEDURE — 92610 EVALUATE SWALLOWING FUNCTION: CPT

## 2019-01-01 PROCEDURE — 36415 COLL VENOUS BLD VENIPUNCTURE: CPT | Performed by: INTERNAL MEDICINE

## 2019-01-01 PROCEDURE — 84145 PROCALCITONIN (PCT): CPT | Performed by: NURSE PRACTITIONER

## 2019-01-01 PROCEDURE — 81001 URINALYSIS AUTO W/SCOPE: CPT | Performed by: EMERGENCY MEDICINE

## 2019-01-01 PROCEDURE — 80053 COMPREHEN METABOLIC PANEL: CPT | Performed by: HOSPITALIST

## 2019-01-01 PROCEDURE — 86738 MYCOPLASMA ANTIBODY: CPT | Performed by: NURSE PRACTITIONER

## 2019-01-01 PROCEDURE — 71045 X-RAY EXAM CHEST 1 VIEW: CPT | Performed by: RADIOLOGY

## 2019-01-01 PROCEDURE — 86901 BLOOD TYPING SEROLOGIC RH(D): CPT

## 2019-01-01 PROCEDURE — 72192 CT PELVIS W/O DYE: CPT | Performed by: RADIOLOGY

## 2019-01-01 PROCEDURE — 96365 THER/PROPH/DIAG IV INF INIT: CPT

## 2019-01-01 PROCEDURE — 0099U HC BIOFIRE FILMARRAY RESP PANEL 1: CPT | Performed by: PHYSICIAN ASSISTANT

## 2019-01-01 PROCEDURE — 99285 EMERGENCY DEPT VISIT HI MDM: CPT

## 2019-01-01 PROCEDURE — 99284 EMERGENCY DEPT VISIT MOD MDM: CPT

## 2019-01-01 PROCEDURE — 99223 1ST HOSP IP/OBS HIGH 75: CPT | Performed by: INTERNAL MEDICINE

## 2019-01-01 PROCEDURE — 83735 ASSAY OF MAGNESIUM: CPT | Performed by: PHYSICIAN ASSISTANT

## 2019-01-01 PROCEDURE — 84443 ASSAY THYROID STIM HORMONE: CPT | Performed by: HOSPITALIST

## 2019-01-01 PROCEDURE — 97530 THERAPEUTIC ACTIVITIES: CPT

## 2019-01-01 PROCEDURE — P9612 CATHETERIZE FOR URINE SPEC: HCPCS

## 2019-01-01 PROCEDURE — 87640 STAPH A DNA AMP PROBE: CPT | Performed by: NURSE PRACTITIONER

## 2019-01-01 PROCEDURE — 83735 ASSAY OF MAGNESIUM: CPT | Performed by: NURSE PRACTITIONER

## 2019-01-01 PROCEDURE — 93005 ELECTROCARDIOGRAM TRACING: CPT | Performed by: PHYSICIAN ASSISTANT

## 2019-01-01 PROCEDURE — 85007 BL SMEAR W/DIFF WBC COUNT: CPT | Performed by: NURSE PRACTITIONER

## 2019-01-01 PROCEDURE — 87899 AGENT NOS ASSAY W/OPTIC: CPT | Performed by: NURSE PRACTITIONER

## 2019-01-01 PROCEDURE — 93005 ELECTROCARDIOGRAM TRACING: CPT | Performed by: INTERNAL MEDICINE

## 2019-01-01 PROCEDURE — 85007 BL SMEAR W/DIFF WBC COUNT: CPT

## 2019-01-01 PROCEDURE — 25010000002 CEFTRIAXONE: Performed by: PHYSICIAN ASSISTANT

## 2019-01-01 PROCEDURE — 86900 BLOOD TYPING SEROLOGIC ABO: CPT | Performed by: PHYSICIAN ASSISTANT

## 2019-01-01 PROCEDURE — 87040 BLOOD CULTURE FOR BACTERIA: CPT | Performed by: HOSPITALIST

## 2019-01-01 PROCEDURE — 97165 OT EVAL LOW COMPLEX 30 MIN: CPT

## 2019-01-01 PROCEDURE — 83036 HEMOGLOBIN GLYCOSYLATED A1C: CPT | Performed by: HOSPITALIST

## 2019-01-01 RX ORDER — ACYCLOVIR 800 MG/1
400 TABLET ORAL 2 TIMES DAILY
Status: DISCONTINUED | OUTPATIENT
Start: 2019-01-01 | End: 2019-01-01 | Stop reason: HOSPADM

## 2019-01-01 RX ORDER — NICOTINE POLACRILEX 4 MG
15 LOZENGE BUCCAL
Status: DISCONTINUED | OUTPATIENT
Start: 2019-01-01 | End: 2019-01-01 | Stop reason: HOSPADM

## 2019-01-01 RX ORDER — FLUCONAZOLE 100 MG/1
400 TABLET ORAL DAILY
Qty: 28 TABLET | Refills: 0 | Status: SHIPPED | OUTPATIENT
Start: 2019-01-01 | End: 2020-01-01

## 2019-01-01 RX ORDER — ASPIRIN 81 MG/1
81 TABLET ORAL DAILY
Qty: 30 TABLET | Refills: 0 | Status: SHIPPED | OUTPATIENT
Start: 2019-01-01 | End: 2019-01-01

## 2019-01-01 RX ORDER — SULFAMETHOXAZOLE AND TRIMETHOPRIM 800; 160 MG/1; MG/1
1 TABLET ORAL 2 TIMES DAILY
Qty: 6 TABLET | Refills: 0 | Status: SHIPPED | OUTPATIENT
Start: 2019-01-01 | End: 2019-01-01

## 2019-01-01 RX ORDER — ACETAMINOPHEN 325 MG/1
650 TABLET ORAL ONCE
Status: COMPLETED | OUTPATIENT
Start: 2019-01-01 | End: 2019-01-01

## 2019-01-01 RX ORDER — GUAIFENESIN 600 MG/1
600 TABLET, EXTENDED RELEASE ORAL EVERY 12 HOURS SCHEDULED
Qty: 20 TABLET | Refills: 0 | Status: SHIPPED | OUTPATIENT
Start: 2019-01-01 | End: 2019-01-01

## 2019-01-01 RX ORDER — NITROGLYCERIN 0.4 MG/1
0.4 TABLET SUBLINGUAL
Status: DISCONTINUED | OUTPATIENT
Start: 2019-01-01 | End: 2019-01-01 | Stop reason: HOSPADM

## 2019-01-01 RX ORDER — ACETAMINOPHEN 325 MG/1
650 TABLET ORAL EVERY 6 HOURS PRN
Status: DISCONTINUED | OUTPATIENT
Start: 2019-01-01 | End: 2019-01-01 | Stop reason: HOSPADM

## 2019-01-01 RX ORDER — SODIUM CHLORIDE 0.9 % (FLUSH) 0.9 %
10 SYRINGE (ML) INJECTION AS NEEDED
Status: DISCONTINUED | OUTPATIENT
Start: 2019-01-01 | End: 2019-01-01 | Stop reason: HOSPADM

## 2019-01-01 RX ORDER — SODIUM CHLORIDE 9 MG/ML
75 INJECTION, SOLUTION INTRAVENOUS CONTINUOUS
Status: DISCONTINUED | OUTPATIENT
Start: 2019-01-01 | End: 2019-01-01 | Stop reason: HOSPADM

## 2019-01-01 RX ORDER — DIPHENHYDRAMINE, LIDOCAINE, NYSTATIN
5 KIT ORAL 4 TIMES DAILY
Status: DISCONTINUED | OUTPATIENT
Start: 2019-01-01 | End: 2019-01-01 | Stop reason: HOSPADM

## 2019-01-01 RX ORDER — ALLOPURINOL 100 MG/1
100 TABLET ORAL 2 TIMES DAILY
Status: CANCELLED | OUTPATIENT
Start: 2019-01-01

## 2019-01-01 RX ORDER — FLUCONAZOLE 200 MG/1
200 TABLET ORAL DAILY
Status: DISCONTINUED | OUTPATIENT
Start: 2019-01-01 | End: 2019-01-01 | Stop reason: HOSPADM

## 2019-01-01 RX ORDER — SODIUM CHLORIDE 0.9 % (FLUSH) 0.9 %
3-10 SYRINGE (ML) INJECTION AS NEEDED
Status: DISCONTINUED | OUTPATIENT
Start: 2019-01-01 | End: 2019-01-01 | Stop reason: HOSPADM

## 2019-01-01 RX ORDER — GLIPIZIDE 2.5 MG/1
2.5 TABLET, EXTENDED RELEASE ORAL DAILY
COMMUNITY
End: 2019-01-01 | Stop reason: HOSPADM

## 2019-01-01 RX ORDER — TRAMADOL HYDROCHLORIDE 50 MG/1
50 TABLET ORAL EVERY 6 HOURS PRN
Status: ON HOLD | COMMUNITY
End: 2019-01-01

## 2019-01-01 RX ORDER — ALLOPURINOL 100 MG/1
100 TABLET ORAL 2 TIMES DAILY
Status: DISCONTINUED | OUTPATIENT
Start: 2019-01-01 | End: 2019-01-01

## 2019-01-01 RX ORDER — SODIUM CHLORIDE 0.9 % (FLUSH) 0.9 %
10 SYRINGE (ML) INJECTION EVERY 12 HOURS SCHEDULED
Status: DISCONTINUED | OUTPATIENT
Start: 2019-01-01 | End: 2019-01-01 | Stop reason: HOSPADM

## 2019-01-01 RX ORDER — ACYCLOVIR 400 MG/1
400 TABLET ORAL 2 TIMES DAILY
Qty: 14 TABLET | Refills: 0 | Status: SHIPPED | OUTPATIENT
Start: 2019-01-01 | End: 2020-01-01

## 2019-01-01 RX ORDER — LEVOFLOXACIN 500 MG/1
500 TABLET, FILM COATED ORAL DAILY
COMMUNITY
Start: 2019-01-01 | End: 2019-01-01 | Stop reason: HOSPADM

## 2019-01-01 RX ORDER — DEXTROSE MONOHYDRATE 25 G/50ML
25 INJECTION, SOLUTION INTRAVENOUS
Status: DISCONTINUED | OUTPATIENT
Start: 2019-01-01 | End: 2019-01-01 | Stop reason: HOSPADM

## 2019-01-01 RX ORDER — BENZONATATE 100 MG/1
100 CAPSULE ORAL 3 TIMES DAILY PRN
Qty: 14 CAPSULE | Refills: 0 | Status: SHIPPED | OUTPATIENT
Start: 2019-01-01 | End: 2019-01-01

## 2019-01-01 RX ORDER — SODIUM CHLORIDE 0.9 % (FLUSH) 0.9 %
3 SYRINGE (ML) INJECTION EVERY 12 HOURS SCHEDULED
Status: DISCONTINUED | OUTPATIENT
Start: 2019-01-01 | End: 2019-01-01 | Stop reason: HOSPADM

## 2019-01-01 RX ORDER — MAGNESIUM SULFATE HEPTAHYDRATE 40 MG/ML
2 INJECTION, SOLUTION INTRAVENOUS AS NEEDED
Status: DISCONTINUED | OUTPATIENT
Start: 2019-01-01 | End: 2019-01-01 | Stop reason: HOSPADM

## 2019-01-01 RX ORDER — AMOXICILLIN AND CLAVULANATE POTASSIUM 500; 125 MG/1; MG/1
1 TABLET, FILM COATED ORAL EVERY 12 HOURS SCHEDULED
Qty: 20 TABLET | Refills: 0 | Status: SHIPPED | OUTPATIENT
Start: 2019-01-01 | End: 2019-01-01

## 2019-01-01 RX ORDER — CYANOCOBALAMIN 1000 UG/ML
1000 INJECTION, SOLUTION INTRAMUSCULAR; SUBCUTANEOUS
Status: DISCONTINUED | OUTPATIENT
Start: 2019-01-01 | End: 2019-01-01 | Stop reason: HOSPADM

## 2019-01-01 RX ORDER — DIPHENHYDRAMINE, LIDOCAINE, NYSTATIN
5 KIT ORAL
Status: DISCONTINUED | OUTPATIENT
Start: 2019-01-01 | End: 2019-01-01 | Stop reason: HOSPADM

## 2019-01-01 RX ORDER — DIPHENHYDRAMINE HCL 12.5MG/5ML
12.5 LIQUID (ML) ORAL
Qty: 237 ML | Refills: 1 | Status: SHIPPED | OUTPATIENT
Start: 2019-01-01 | End: 2019-01-01

## 2019-01-01 RX ORDER — FLUCONAZOLE 200 MG/1
400 TABLET ORAL EVERY 24 HOURS
Status: DISCONTINUED | OUTPATIENT
Start: 2019-01-01 | End: 2019-01-01

## 2019-01-01 RX ORDER — IPRATROPIUM BROMIDE AND ALBUTEROL SULFATE 2.5; .5 MG/3ML; MG/3ML
3 SOLUTION RESPIRATORY (INHALATION)
Status: DISCONTINUED | OUTPATIENT
Start: 2019-01-01 | End: 2019-01-01 | Stop reason: HOSPADM

## 2019-01-01 RX ORDER — GUAIFENESIN 600 MG/1
600 TABLET, EXTENDED RELEASE ORAL EVERY 12 HOURS SCHEDULED
Status: DISCONTINUED | OUTPATIENT
Start: 2019-01-01 | End: 2019-01-01 | Stop reason: HOSPADM

## 2019-01-01 RX ORDER — L.ACID,PARA/B.BIFIDUM/S.THERM 8B CELL
1 CAPSULE ORAL DAILY
Status: DISCONTINUED | OUTPATIENT
Start: 2019-01-01 | End: 2019-01-01 | Stop reason: HOSPADM

## 2019-01-01 RX ORDER — GLIPIZIDE 5 MG/1
1.25 TABLET ORAL
Status: CANCELLED | OUTPATIENT
Start: 2019-01-01

## 2019-01-01 RX ORDER — ALLOPURINOL 100 MG/1
100 TABLET ORAL 2 TIMES DAILY
Status: DISCONTINUED | OUTPATIENT
Start: 2019-01-01 | End: 2019-01-01 | Stop reason: HOSPADM

## 2019-01-01 RX ORDER — IPRATROPIUM BROMIDE AND ALBUTEROL SULFATE 2.5; .5 MG/3ML; MG/3ML
3 SOLUTION RESPIRATORY (INHALATION) EVERY 6 HOURS PRN
Status: DISCONTINUED | OUTPATIENT
Start: 2019-01-01 | End: 2019-01-01 | Stop reason: HOSPADM

## 2019-01-01 RX ORDER — MEGESTROL ACETATE 40 MG/1
40 TABLET ORAL
Status: DISCONTINUED | OUTPATIENT
Start: 2019-01-01 | End: 2019-01-01 | Stop reason: HOSPADM

## 2019-01-01 RX ORDER — MAGNESIUM SULFATE HEPTAHYDRATE 40 MG/ML
4 INJECTION, SOLUTION INTRAVENOUS ONCE
Status: COMPLETED | OUTPATIENT
Start: 2019-01-01 | End: 2019-01-01

## 2019-01-01 RX ORDER — SODIUM CHLORIDE 9 MG/ML
125 INJECTION, SOLUTION INTRAVENOUS CONTINUOUS
Status: DISCONTINUED | OUTPATIENT
Start: 2019-01-01 | End: 2019-01-01

## 2019-01-01 RX ORDER — MAGNESIUM SULFATE HEPTAHYDRATE 40 MG/ML
4 INJECTION, SOLUTION INTRAVENOUS AS NEEDED
Status: DISCONTINUED | OUTPATIENT
Start: 2019-01-01 | End: 2019-01-01 | Stop reason: HOSPADM

## 2019-01-01 RX ORDER — AMOXICILLIN 500 MG/1
500 CAPSULE ORAL 3 TIMES DAILY
Qty: 21 CAPSULE | Refills: 0 | Status: SHIPPED | OUTPATIENT
Start: 2019-01-01 | End: 2020-01-01

## 2019-01-01 RX ORDER — MEGESTROL ACETATE 40 MG/1
40 TABLET ORAL
Qty: 90 TABLET | Refills: 0 | Status: SHIPPED | OUTPATIENT
Start: 2019-01-01 | End: 2020-01-29

## 2019-01-01 RX ORDER — CYANOCOBALAMIN 1000 UG/ML
1000 INJECTION, SOLUTION INTRAMUSCULAR; SUBCUTANEOUS
COMMUNITY
Start: 2019-01-01

## 2019-01-01 RX ORDER — DIPHENHYDRAMINE, LIDOCAINE, NYSTATIN
5 KIT ORAL
Qty: 60 ML | Refills: 1 | Status: SHIPPED | OUTPATIENT
Start: 2019-01-01 | End: 2019-01-01

## 2019-01-01 RX ORDER — BENZONATATE 100 MG/1
100 CAPSULE ORAL 3 TIMES DAILY PRN
Status: DISCONTINUED | OUTPATIENT
Start: 2019-01-01 | End: 2019-01-01 | Stop reason: HOSPADM

## 2019-01-01 RX ORDER — FLUCONAZOLE 200 MG/1
400 TABLET ORAL DAILY
Status: DISCONTINUED | OUTPATIENT
Start: 2019-01-01 | End: 2019-01-01

## 2019-01-01 RX ORDER — LEVOFLOXACIN 500 MG/1
500 TABLET, FILM COATED ORAL DAILY
Status: CANCELLED | OUTPATIENT
Start: 2019-01-01 | End: 2019-01-01

## 2019-01-01 RX ORDER — MEGESTROL ACETATE 40 MG/1
40 TABLET ORAL
Qty: 90 TABLET | Refills: 0 | Status: ON HOLD | OUTPATIENT
Start: 2019-01-01 | End: 2019-01-01

## 2019-01-01 RX ORDER — LEVOFLOXACIN 500 MG/1
500 TABLET, FILM COATED ORAL EVERY 24 HOURS
Status: DISCONTINUED | OUTPATIENT
Start: 2019-01-01 | End: 2019-01-01 | Stop reason: HOSPADM

## 2019-01-01 RX ORDER — POTASSIUM CHLORIDE 7.45 MG/ML
10 INJECTION INTRAVENOUS
Status: DISCONTINUED | OUTPATIENT
Start: 2019-01-01 | End: 2019-01-01 | Stop reason: HOSPADM

## 2019-01-01 RX ORDER — ALLOPURINOL 100 MG/1
100 TABLET ORAL 2 TIMES DAILY
COMMUNITY
End: 2019-01-01 | Stop reason: HOSPADM

## 2019-01-01 RX ORDER — ASPIRIN 81 MG/1
81 TABLET ORAL DAILY
Status: DISCONTINUED | OUTPATIENT
Start: 2019-01-01 | End: 2019-01-01 | Stop reason: HOSPADM

## 2019-01-01 RX ORDER — CYANOCOBALAMIN 1000 UG/ML
1000 INJECTION, SOLUTION INTRAMUSCULAR; SUBCUTANEOUS
Status: CANCELLED | OUTPATIENT
Start: 2019-01-01

## 2019-01-01 RX ORDER — MAGNESIUM SULFATE HEPTAHYDRATE 40 MG/ML
2 INJECTION, SOLUTION INTRAVENOUS
Status: DISPENSED | OUTPATIENT
Start: 2019-01-01 | End: 2019-01-01

## 2019-01-01 RX ORDER — ACYCLOVIR 800 MG/1
400 TABLET ORAL 2 TIMES DAILY
Status: CANCELLED | OUTPATIENT
Start: 2019-01-01 | End: 2019-01-01

## 2019-01-01 RX ORDER — AMOXICILLIN AND CLAVULANATE POTASSIUM 500; 125 MG/1; MG/1
1 TABLET, FILM COATED ORAL EVERY 12 HOURS SCHEDULED
Status: DISCONTINUED | OUTPATIENT
Start: 2019-01-01 | End: 2019-01-01 | Stop reason: HOSPADM

## 2019-01-01 RX ORDER — DIPHENHYDRAMINE HCL 12.5MG/5ML
12.5 LIQUID (ML) ORAL
Status: DISCONTINUED | OUTPATIENT
Start: 2019-01-01 | End: 2019-01-01 | Stop reason: HOSPADM

## 2019-01-01 RX ORDER — LEVOFLOXACIN 500 MG/1
500 TABLET, FILM COATED ORAL DAILY
Status: CANCELLED | OUTPATIENT
Start: 2019-01-01 | End: 2020-01-01

## 2019-01-01 RX ORDER — ACYCLOVIR 400 MG/1
400 TABLET ORAL 2 TIMES DAILY
Status: ON HOLD | COMMUNITY
End: 2019-01-01

## 2019-01-01 RX ORDER — FLUCONAZOLE 200 MG/1
200 TABLET ORAL DAILY
Qty: 27 TABLET | Refills: 0 | Status: SHIPPED | OUTPATIENT
Start: 2019-01-01 | End: 2019-01-01

## 2019-01-01 RX ORDER — DIPHENHYDRAMINE, LIDOCAINE, NYSTATIN
5 KIT ORAL 4 TIMES DAILY
Qty: 140 ML | Refills: 0 | Status: SHIPPED | OUTPATIENT
Start: 2019-01-01 | End: 2020-01-01

## 2019-01-01 RX ADMIN — INSULIN ASPART 2 UNITS: 100 INJECTION, SOLUTION INTRAVENOUS; SUBCUTANEOUS at 08:24

## 2019-01-01 RX ADMIN — SODIUM CHLORIDE, PRESERVATIVE FREE 10 ML: 5 INJECTION INTRAVENOUS at 08:51

## 2019-01-01 RX ADMIN — ACYCLOVIR 400 MG: 800 TABLET ORAL at 00:27

## 2019-01-01 RX ADMIN — Medication 5 ML: at 14:25

## 2019-01-01 RX ADMIN — ALLOPURINOL 100 MG: 100 TABLET ORAL at 09:18

## 2019-01-01 RX ADMIN — PIPERACILLIN SODIUM,TAZOBACTAM SODIUM 3.38 G: 3; .375 INJECTION, POWDER, FOR SOLUTION INTRAVENOUS at 21:18

## 2019-01-01 RX ADMIN — CEFEPIME 2 G: 2 INJECTION, POWDER, FOR SOLUTION INTRAVENOUS at 00:28

## 2019-01-01 RX ADMIN — CEFEPIME 2 G: 2 INJECTION, POWDER, FOR SOLUTION INTRAVENOUS at 14:22

## 2019-01-01 RX ADMIN — ALLOPURINOL 100 MG: 100 TABLET ORAL at 08:42

## 2019-01-01 RX ADMIN — INSULIN ASPART 3 UNITS: 100 INJECTION, SOLUTION INTRAVENOUS; SUBCUTANEOUS at 08:24

## 2019-01-01 RX ADMIN — FLUCONAZOLE 200 MG: 200 TABLET ORAL at 08:23

## 2019-01-01 RX ADMIN — IPRATROPIUM BROMIDE AND ALBUTEROL SULFATE 3 ML: .5; 3 SOLUTION RESPIRATORY (INHALATION) at 06:49

## 2019-01-01 RX ADMIN — MEGESTROL ACETATE 40 MG: 40 TABLET ORAL at 12:44

## 2019-01-01 RX ADMIN — ACETAMINOPHEN 650 MG: 325 TABLET ORAL at 10:18

## 2019-01-01 RX ADMIN — DOXYCYCLINE 100 MG: 100 INJECTION, POWDER, LYOPHILIZED, FOR SOLUTION INTRAVENOUS at 10:14

## 2019-01-01 RX ADMIN — SODIUM CHLORIDE, PRESERVATIVE FREE 10 ML: 5 INJECTION INTRAVENOUS at 08:38

## 2019-01-01 RX ADMIN — ACYCLOVIR 400 MG: 800 TABLET ORAL at 08:41

## 2019-01-01 RX ADMIN — ASPIRIN 81 MG: 81 TABLET ORAL at 07:29

## 2019-01-01 RX ADMIN — SODIUM CHLORIDE, PRESERVATIVE FREE 10 ML: 5 INJECTION INTRAVENOUS at 08:00

## 2019-01-01 RX ADMIN — INSULIN ASPART 2 UNITS: 100 INJECTION, SOLUTION INTRAVENOUS; SUBCUTANEOUS at 11:39

## 2019-01-01 RX ADMIN — ACETAMINOPHEN 650 MG: 325 TABLET ORAL at 18:49

## 2019-01-01 RX ADMIN — DOXYCYCLINE 100 MG: 100 INJECTION, POWDER, LYOPHILIZED, FOR SOLUTION INTRAVENOUS at 22:58

## 2019-01-01 RX ADMIN — SODIUM CHLORIDE 500 ML: 9 INJECTION, SOLUTION INTRAVENOUS at 18:49

## 2019-01-01 RX ADMIN — ACETAMINOPHEN 650 MG: 325 TABLET ORAL at 17:14

## 2019-01-01 RX ADMIN — IPRATROPIUM BROMIDE AND ALBUTEROL SULFATE 3 ML: .5; 3 SOLUTION RESPIRATORY (INHALATION) at 13:22

## 2019-01-01 RX ADMIN — Medication 5 ML: at 11:55

## 2019-01-01 RX ADMIN — DOXYCYCLINE 100 MG: 100 INJECTION, POWDER, LYOPHILIZED, FOR SOLUTION INTRAVENOUS at 22:44

## 2019-01-01 RX ADMIN — GUAIFENESIN 600 MG: 600 TABLET, EXTENDED RELEASE ORAL at 08:23

## 2019-01-01 RX ADMIN — Medication 5 ML: at 18:15

## 2019-01-01 RX ADMIN — ACYCLOVIR 400 MG: 800 TABLET ORAL at 09:18

## 2019-01-01 RX ADMIN — INSULIN ASPART 2 UNITS: 100 INJECTION, SOLUTION INTRAVENOUS; SUBCUTANEOUS at 01:23

## 2019-01-01 RX ADMIN — MEGESTROL ACETATE 40 MG: 40 TABLET ORAL at 08:24

## 2019-01-01 RX ADMIN — INSULIN ASPART 3 UNITS: 100 INJECTION, SOLUTION INTRAVENOUS; SUBCUTANEOUS at 09:20

## 2019-01-01 RX ADMIN — INSULIN ASPART 4 UNITS: 100 INJECTION, SOLUTION INTRAVENOUS; SUBCUTANEOUS at 17:08

## 2019-01-01 RX ADMIN — INSULIN ASPART 2 UNITS: 100 INJECTION, SOLUTION INTRAVENOUS; SUBCUTANEOUS at 16:51

## 2019-01-01 RX ADMIN — IPRATROPIUM BROMIDE AND ALBUTEROL SULFATE 3 ML: .5; 3 SOLUTION RESPIRATORY (INHALATION) at 18:50

## 2019-01-01 RX ADMIN — INSULIN ASPART 4 UNITS: 100 INJECTION, SOLUTION INTRAVENOUS; SUBCUTANEOUS at 11:30

## 2019-01-01 RX ADMIN — ACETAMINOPHEN 650 MG: 325 TABLET ORAL at 05:15

## 2019-01-01 RX ADMIN — MEGESTROL ACETATE 40 MG: 40 TABLET ORAL at 17:09

## 2019-01-01 RX ADMIN — ACETAMINOPHEN 650 MG: 325 TABLET ORAL at 16:04

## 2019-01-01 RX ADMIN — Medication 1 CAPSULE: at 17:06

## 2019-01-01 RX ADMIN — INSULIN ASPART 5 UNITS: 100 INJECTION, SOLUTION INTRAVENOUS; SUBCUTANEOUS at 21:18

## 2019-01-01 RX ADMIN — ALLOPURINOL 100 MG: 100 TABLET ORAL at 00:27

## 2019-01-01 RX ADMIN — Medication 5 ML: at 21:18

## 2019-01-01 RX ADMIN — ACYCLOVIR 400 MG: 800 TABLET ORAL at 20:35

## 2019-01-01 RX ADMIN — MEGESTROL ACETATE 40 MG: 40 TABLET ORAL at 11:39

## 2019-01-01 RX ADMIN — INSULIN ASPART 3 UNITS: 100 INJECTION, SOLUTION INTRAVENOUS; SUBCUTANEOUS at 08:42

## 2019-01-01 RX ADMIN — CEFEPIME 2 G: 2 INJECTION, POWDER, FOR SOLUTION INTRAVENOUS at 01:27

## 2019-01-01 RX ADMIN — MEGESTROL ACETATE 40 MG: 40 TABLET ORAL at 17:17

## 2019-01-01 RX ADMIN — ACETAMINOPHEN 650 MG: 325 TABLET ORAL at 05:41

## 2019-01-01 RX ADMIN — INSULIN ASPART 2 UNITS: 100 INJECTION, SOLUTION INTRAVENOUS; SUBCUTANEOUS at 16:57

## 2019-01-01 RX ADMIN — CEFEPIME 2 G: 2 INJECTION, POWDER, FOR SOLUTION INTRAVENOUS at 00:23

## 2019-01-01 RX ADMIN — SODIUM CHLORIDE, PRESERVATIVE FREE 3 ML: 5 INJECTION INTRAVENOUS at 08:18

## 2019-01-01 RX ADMIN — ACYCLOVIR 400 MG: 800 TABLET ORAL at 08:23

## 2019-01-01 RX ADMIN — MAGNESIUM SULFATE IN WATER 2 G: 40 INJECTION, SOLUTION INTRAVENOUS at 21:29

## 2019-01-01 RX ADMIN — IPRATROPIUM BROMIDE AND ALBUTEROL SULFATE 3 ML: .5; 3 SOLUTION RESPIRATORY (INHALATION) at 07:25

## 2019-01-01 RX ADMIN — DOXYCYCLINE 100 MG: 100 INJECTION, POWDER, LYOPHILIZED, FOR SOLUTION INTRAVENOUS at 23:50

## 2019-01-01 RX ADMIN — ACYCLOVIR 400 MG: 800 TABLET ORAL at 08:24

## 2019-01-01 RX ADMIN — MAGNESIUM SULFATE HEPTAHYDRATE 4 G: 40 INJECTION, SOLUTION INTRAVENOUS at 16:29

## 2019-01-01 RX ADMIN — INSULIN ASPART 4 UNITS: 100 INJECTION, SOLUTION INTRAVENOUS; SUBCUTANEOUS at 20:35

## 2019-01-01 RX ADMIN — Medication 5 ML: at 09:39

## 2019-01-01 RX ADMIN — IPRATROPIUM BROMIDE AND ALBUTEROL SULFATE 3 ML: .5; 3 SOLUTION RESPIRATORY (INHALATION) at 00:29

## 2019-01-01 RX ADMIN — MEGESTROL ACETATE 40 MG: 40 TABLET ORAL at 17:04

## 2019-01-01 RX ADMIN — SODIUM CHLORIDE, PRESERVATIVE FREE 10 ML: 5 INJECTION INTRAVENOUS at 08:24

## 2019-01-01 RX ADMIN — INSULIN ASPART 2 UNITS: 100 INJECTION, SOLUTION INTRAVENOUS; SUBCUTANEOUS at 17:17

## 2019-01-01 RX ADMIN — ALLOPURINOL 100 MG: 100 TABLET ORAL at 20:35

## 2019-01-01 RX ADMIN — CEFEPIME 2 G: 2 INJECTION, POWDER, FOR SOLUTION INTRAVENOUS at 00:10

## 2019-01-01 RX ADMIN — Medication 5 ML: at 17:04

## 2019-01-01 RX ADMIN — GUAIFENESIN 600 MG: 600 TABLET, EXTENDED RELEASE ORAL at 09:39

## 2019-01-01 RX ADMIN — SODIUM CHLORIDE 75 ML/HR: 9 INJECTION, SOLUTION INTRAVENOUS at 17:44

## 2019-01-01 RX ADMIN — INSULIN ASPART 3 UNITS: 100 INJECTION, SOLUTION INTRAVENOUS; SUBCUTANEOUS at 07:29

## 2019-01-01 RX ADMIN — INSULIN ASPART 3 UNITS: 100 INJECTION, SOLUTION INTRAVENOUS; SUBCUTANEOUS at 12:09

## 2019-01-01 RX ADMIN — INSULIN ASPART 3 UNITS: 100 INJECTION, SOLUTION INTRAVENOUS; SUBCUTANEOUS at 12:15

## 2019-01-01 RX ADMIN — ACYCLOVIR 400 MG: 800 TABLET ORAL at 21:07

## 2019-01-01 RX ADMIN — FLUCONAZOLE 400 MG: 200 TABLET ORAL at 12:31

## 2019-01-01 RX ADMIN — IPRATROPIUM BROMIDE AND ALBUTEROL SULFATE 3 ML: .5; 3 SOLUTION RESPIRATORY (INHALATION) at 00:25

## 2019-01-01 RX ADMIN — GUAIFENESIN 600 MG: 600 TABLET, EXTENDED RELEASE ORAL at 08:24

## 2019-01-01 RX ADMIN — SODIUM CHLORIDE 75 ML/HR: 9 INJECTION, SOLUTION INTRAVENOUS at 03:38

## 2019-01-01 RX ADMIN — Medication 1 CAPSULE: at 07:29

## 2019-01-01 RX ADMIN — GUAIFENESIN 600 MG: 600 TABLET, EXTENDED RELEASE ORAL at 20:39

## 2019-01-01 RX ADMIN — MEGESTROL ACETATE 40 MG: 40 TABLET ORAL at 11:55

## 2019-01-01 RX ADMIN — SODIUM CHLORIDE 1000 ML: 9 INJECTION, SOLUTION INTRAVENOUS at 23:12

## 2019-01-01 RX ADMIN — Medication 5 ML: at 14:36

## 2019-01-01 RX ADMIN — FLUCONAZOLE 200 MG: 200 TABLET ORAL at 08:38

## 2019-01-01 RX ADMIN — LEVOFLOXACIN 500 MG: 500 TABLET, FILM COATED ORAL at 07:55

## 2019-01-01 RX ADMIN — PIPERACILLIN SODIUM,TAZOBACTAM SODIUM 3.38 G: 3; .375 INJECTION, POWDER, FOR SOLUTION INTRAVENOUS at 15:22

## 2019-01-01 RX ADMIN — MEGESTROL ACETATE 40 MG: 40 TABLET ORAL at 18:15

## 2019-01-01 RX ADMIN — GUAIFENESIN 600 MG: 600 TABLET, EXTENDED RELEASE ORAL at 20:35

## 2019-01-01 RX ADMIN — MEGESTROL ACETATE 40 MG: 40 TABLET ORAL at 08:50

## 2019-01-01 RX ADMIN — INSULIN ASPART 3 UNITS: 100 INJECTION, SOLUTION INTRAVENOUS; SUBCUTANEOUS at 20:40

## 2019-01-01 RX ADMIN — CHLORASEPTIC 2 SPRAY: 1.5 LIQUID ORAL at 17:04

## 2019-01-01 RX ADMIN — Medication 5 ML: at 11:39

## 2019-01-01 RX ADMIN — DOXYCYCLINE 100 MG: 100 INJECTION, POWDER, LYOPHILIZED, FOR SOLUTION INTRAVENOUS at 23:00

## 2019-01-01 RX ADMIN — SODIUM CHLORIDE, PRESERVATIVE FREE 10 ML: 5 INJECTION INTRAVENOUS at 09:38

## 2019-01-01 RX ADMIN — MEGESTROL ACETATE 40 MG: 40 TABLET ORAL at 10:23

## 2019-01-01 RX ADMIN — Medication 5 ML: at 21:08

## 2019-01-01 RX ADMIN — ACETAMINOPHEN 650 MG: 325 TABLET ORAL at 11:38

## 2019-01-01 RX ADMIN — DOXYCYCLINE 100 MG: 100 INJECTION, POWDER, LYOPHILIZED, FOR SOLUTION INTRAVENOUS at 11:50

## 2019-01-01 RX ADMIN — INSULIN ASPART 2 UNITS: 100 INJECTION, SOLUTION INTRAVENOUS; SUBCUTANEOUS at 07:56

## 2019-01-01 RX ADMIN — ACYCLOVIR 400 MG: 800 TABLET ORAL at 08:38

## 2019-01-01 RX ADMIN — Medication 5 ML: at 17:25

## 2019-01-01 RX ADMIN — ACYCLOVIR 400 MG: 800 TABLET ORAL at 20:39

## 2019-01-01 RX ADMIN — ALLOPURINOL 100 MG: 100 TABLET ORAL at 08:50

## 2019-01-01 RX ADMIN — INSULIN ASPART 3 UNITS: 100 INJECTION, SOLUTION INTRAVENOUS; SUBCUTANEOUS at 17:02

## 2019-01-01 RX ADMIN — MEGESTROL ACETATE 40 MG: 40 TABLET ORAL at 12:16

## 2019-01-01 RX ADMIN — CEFEPIME 2 G: 2 INJECTION, POWDER, FOR SOLUTION INTRAVENOUS at 11:39

## 2019-01-01 RX ADMIN — GUAIFENESIN 600 MG: 600 TABLET, EXTENDED RELEASE ORAL at 08:00

## 2019-01-01 RX ADMIN — CEFEPIME 2 G: 2 INJECTION, POWDER, FOR SOLUTION INTRAVENOUS at 11:56

## 2019-01-01 RX ADMIN — INSULIN ASPART 3 UNITS: 100 INJECTION, SOLUTION INTRAVENOUS; SUBCUTANEOUS at 20:26

## 2019-01-01 RX ADMIN — SODIUM CHLORIDE, PRESERVATIVE FREE 10 ML: 5 INJECTION INTRAVENOUS at 08:42

## 2019-01-01 RX ADMIN — SODIUM CHLORIDE, PRESERVATIVE FREE 3 ML: 5 INJECTION INTRAVENOUS at 20:46

## 2019-01-01 RX ADMIN — ALLOPURINOL 100 MG: 100 TABLET ORAL at 22:19

## 2019-01-01 RX ADMIN — MEGESTROL ACETATE 40 MG: 40 TABLET ORAL at 09:38

## 2019-01-01 RX ADMIN — MEGESTROL ACETATE 40 MG: 40 TABLET ORAL at 12:17

## 2019-01-01 RX ADMIN — VANCOMYCIN HYDROCHLORIDE 1000 MG: 5 INJECTION, POWDER, LYOPHILIZED, FOR SOLUTION INTRAVENOUS at 16:00

## 2019-01-01 RX ADMIN — GUAIFENESIN 600 MG: 600 TABLET, EXTENDED RELEASE ORAL at 21:03

## 2019-01-01 RX ADMIN — INSULIN ASPART 3 UNITS: 100 INJECTION, SOLUTION INTRAVENOUS; SUBCUTANEOUS at 20:45

## 2019-01-01 RX ADMIN — MEGESTROL ACETATE 40 MG: 40 TABLET ORAL at 08:23

## 2019-01-01 RX ADMIN — BENZONATATE 100 MG: 100 CAPSULE ORAL at 16:38

## 2019-01-01 RX ADMIN — ACYCLOVIR 400 MG: 800 TABLET ORAL at 08:50

## 2019-01-01 RX ADMIN — SODIUM CHLORIDE, PRESERVATIVE FREE 10 ML: 5 INJECTION INTRAVENOUS at 20:35

## 2019-01-01 RX ADMIN — DOXYCYCLINE 100 MG: 100 INJECTION, POWDER, LYOPHILIZED, FOR SOLUTION INTRAVENOUS at 22:59

## 2019-01-01 RX ADMIN — ASPIRIN 81 MG: 81 TABLET ORAL at 02:00

## 2019-01-01 RX ADMIN — ALLOPURINOL 100 MG: 100 TABLET ORAL at 20:37

## 2019-01-01 RX ADMIN — INSULIN ASPART 2 UNITS: 100 INJECTION, SOLUTION INTRAVENOUS; SUBCUTANEOUS at 07:43

## 2019-01-01 RX ADMIN — SODIUM CHLORIDE, PRESERVATIVE FREE 10 ML: 5 INJECTION INTRAVENOUS at 08:26

## 2019-01-01 RX ADMIN — CEFTRIAXONE 1 G: 1 INJECTION, POWDER, FOR SOLUTION INTRAMUSCULAR; INTRAVENOUS at 23:12

## 2019-01-01 RX ADMIN — DOXYCYCLINE 100 MG: 100 INJECTION, POWDER, LYOPHILIZED, FOR SOLUTION INTRAVENOUS at 22:20

## 2019-01-01 RX ADMIN — MEGESTROL ACETATE 40 MG: 40 TABLET ORAL at 08:00

## 2019-01-01 RX ADMIN — GUAIFENESIN 600 MG: 600 TABLET, EXTENDED RELEASE ORAL at 08:38

## 2019-01-01 RX ADMIN — Medication 5 ML: at 12:16

## 2019-01-01 RX ADMIN — MEGESTROL ACETATE 40 MG: 40 TABLET ORAL at 16:32

## 2019-01-01 RX ADMIN — INSULIN ASPART 3 UNITS: 100 INJECTION, SOLUTION INTRAVENOUS; SUBCUTANEOUS at 20:37

## 2019-01-01 RX ADMIN — SODIUM CHLORIDE, PRESERVATIVE FREE 10 ML: 5 INJECTION INTRAVENOUS at 20:26

## 2019-01-01 RX ADMIN — IPRATROPIUM BROMIDE AND ALBUTEROL SULFATE 3 ML: .5; 3 SOLUTION RESPIRATORY (INHALATION) at 18:57

## 2019-01-01 RX ADMIN — GUAIFENESIN 600 MG: 600 TABLET, EXTENDED RELEASE ORAL at 20:27

## 2019-01-01 RX ADMIN — INSULIN ASPART 2 UNITS: 100 INJECTION, SOLUTION INTRAVENOUS; SUBCUTANEOUS at 09:48

## 2019-01-01 RX ADMIN — CEFEPIME 2 G: 2 INJECTION, POWDER, FOR SOLUTION INTRAVENOUS at 23:50

## 2019-01-01 RX ADMIN — ACYCLOVIR 400 MG: 800 TABLET ORAL at 20:36

## 2019-01-01 RX ADMIN — ACETAMINOPHEN 650 MG: 325 TABLET ORAL at 16:38

## 2019-01-01 RX ADMIN — INSULIN ASPART 2 UNITS: 100 INJECTION, SOLUTION INTRAVENOUS; SUBCUTANEOUS at 17:05

## 2019-01-01 RX ADMIN — GUAIFENESIN 600 MG: 600 TABLET, EXTENDED RELEASE ORAL at 08:50

## 2019-01-01 RX ADMIN — Medication 5 ML: at 08:26

## 2019-01-01 RX ADMIN — SODIUM CHLORIDE, PRESERVATIVE FREE 10 ML: 5 INJECTION INTRAVENOUS at 22:20

## 2019-01-01 RX ADMIN — SODIUM CHLORIDE, PRESERVATIVE FREE 3 ML: 5 INJECTION INTRAVENOUS at 10:12

## 2019-01-01 RX ADMIN — GUAIFENESIN 600 MG: 600 TABLET, EXTENDED RELEASE ORAL at 22:19

## 2019-01-01 RX ADMIN — INSULIN ASPART 2 UNITS: 100 INJECTION, SOLUTION INTRAVENOUS; SUBCUTANEOUS at 12:16

## 2019-01-01 RX ADMIN — CEFEPIME 2 G: 2 INJECTION, POWDER, FOR SOLUTION INTRAVENOUS at 23:39

## 2019-01-01 RX ADMIN — ALLOPURINOL 100 MG: 100 TABLET ORAL at 07:56

## 2019-01-01 RX ADMIN — CEFTRIAXONE 1 G: 1 INJECTION, POWDER, FOR SOLUTION INTRAMUSCULAR; INTRAVENOUS at 22:53

## 2019-01-01 RX ADMIN — DOXYCYCLINE 100 MG: 100 INJECTION, POWDER, LYOPHILIZED, FOR SOLUTION INTRAVENOUS at 10:31

## 2019-01-01 RX ADMIN — Medication 5 ML: at 21:03

## 2019-01-01 RX ADMIN — GUAIFENESIN 600 MG: 600 TABLET, EXTENDED RELEASE ORAL at 08:41

## 2019-01-01 RX ADMIN — INSULIN ASPART 2 UNITS: 100 INJECTION, SOLUTION INTRAVENOUS; SUBCUTANEOUS at 21:03

## 2019-01-01 RX ADMIN — INSULIN ASPART 3 UNITS: 100 INJECTION, SOLUTION INTRAVENOUS; SUBCUTANEOUS at 08:51

## 2019-01-01 RX ADMIN — INSULIN ASPART 2 UNITS: 100 INJECTION, SOLUTION INTRAVENOUS; SUBCUTANEOUS at 08:38

## 2019-01-01 RX ADMIN — PIPERACILLIN SODIUM,TAZOBACTAM SODIUM 3.38 G: 3; .375 INJECTION, POWDER, FOR SOLUTION INTRAVENOUS at 06:05

## 2019-01-01 RX ADMIN — INSULIN ASPART 2 UNITS: 100 INJECTION, SOLUTION INTRAVENOUS; SUBCUTANEOUS at 21:07

## 2019-01-01 RX ADMIN — DOXYCYCLINE 100 MG: 100 INJECTION, POWDER, LYOPHILIZED, FOR SOLUTION INTRAVENOUS at 10:46

## 2019-01-01 RX ADMIN — MEROPENEM 1 G: 1 INJECTION, POWDER, FOR SOLUTION INTRAVENOUS at 18:53

## 2019-01-01 RX ADMIN — SODIUM CHLORIDE, PRESERVATIVE FREE 10 ML: 5 INJECTION INTRAVENOUS at 09:19

## 2019-01-01 RX ADMIN — SODIUM CHLORIDE, PRESERVATIVE FREE 10 ML: 5 INJECTION INTRAVENOUS at 20:41

## 2019-01-01 RX ADMIN — GUAIFENESIN 600 MG: 600 TABLET, EXTENDED RELEASE ORAL at 21:07

## 2019-01-01 RX ADMIN — Medication 5 ML: at 08:24

## 2019-01-01 RX ADMIN — INSULIN ASPART 2 UNITS: 100 INJECTION, SOLUTION INTRAVENOUS; SUBCUTANEOUS at 11:56

## 2019-01-01 RX ADMIN — AMOXICILLIN AND CLAVULANATE POTASSIUM 500 MG: 500; 125 TABLET, FILM COATED ORAL at 12:44

## 2019-01-01 RX ADMIN — INSULIN ASPART 3 UNITS: 100 INJECTION, SOLUTION INTRAVENOUS; SUBCUTANEOUS at 23:50

## 2019-01-01 RX ADMIN — INSULIN ASPART 3 UNITS: 100 INJECTION, SOLUTION INTRAVENOUS; SUBCUTANEOUS at 16:33

## 2019-01-01 RX ADMIN — ACYCLOVIR 400 MG: 800 TABLET ORAL at 22:19

## 2019-01-01 RX ADMIN — GUAIFENESIN 600 MG: 600 TABLET, EXTENDED RELEASE ORAL at 21:17

## 2019-01-01 RX ADMIN — MAGNESIUM SULFATE IN WATER 2 G: 40 INJECTION, SOLUTION INTRAVENOUS at 23:51

## 2019-01-01 RX ADMIN — ACYCLOVIR 400 MG: 800 TABLET ORAL at 07:55

## 2019-01-01 RX ADMIN — CEFEPIME 2 G: 2 INJECTION, POWDER, FOR SOLUTION INTRAVENOUS at 12:15

## 2019-01-01 RX ADMIN — SODIUM CHLORIDE, PRESERVATIVE FREE 10 ML: 5 INJECTION INTRAVENOUS at 21:02

## 2019-01-01 RX ADMIN — MEGESTROL ACETATE 40 MG: 40 TABLET ORAL at 08:38

## 2019-01-01 RX ADMIN — SODIUM CHLORIDE 75 ML/HR: 9 INJECTION, SOLUTION INTRAVENOUS at 20:37

## 2019-01-01 RX ADMIN — DOXYCYCLINE 100 MG: 100 INJECTION, POWDER, LYOPHILIZED, FOR SOLUTION INTRAVENOUS at 11:22

## 2019-01-01 RX ADMIN — CEFEPIME 2 G: 2 INJECTION, POWDER, FOR SOLUTION INTRAVENOUS at 12:47

## 2019-01-01 RX ADMIN — ACYCLOVIR 400 MG: 800 TABLET ORAL at 21:02

## 2019-07-10 PROBLEM — N39.0 UTI (URINARY TRACT INFECTION): Status: ACTIVE | Noted: 2019-01-01

## 2019-07-11 NOTE — PROGRESS NOTES
Discharge Planning Assessment   Misael     Patient Name: Shruthi Walker  MRN: [id number]  Today's Date: 7/11/2019    Admit Date: 7/10/2019    Discharge Needs Assessment     Row Name 07/11/19 1117       Living Environment    Lives With  alone    Current Living Arrangements  home/apartment/condo    Primary Care Provided by  self    Provides Primary Care For  no one        Discharge Plan     Row Name 07/11/19 1113       Plan    Plan  Pt admitted on 7/10/19.  SS received consult per [facility] for discharge planning.  SS spoke with pt on this date.  Pt lives at home alone and plans to return home at disharge.  Pt does not utilize home health services.  Pt currently utilizes cane via [facility] provider.  Pt's PCP is Dr. Cason.  SS will follow and assist with discharge needs.             Demographic Summary     Row Name 07/11/19 1116       General Information    Admission Type  inpatient    Referral Source  nursing    Reason for Consult  discharge planning    Preferred Language  English    Row Name 07/11/19 1112       General Information    Admission Type  inpatient    Referral Source  nursing    Reason for Consult  discharge planning    Preferred Language  English              Shadia Harry

## 2019-07-11 NOTE — ED NOTES
"Pt daughter at bedside at this time. Pt daughter states that she called her mom this morning and \"she didn't sound like herself, her voice was different.\" Pt daughter states \"she didn't sound slurred or anything, just different tone of her voice.\" Pt daughter states that she went to her mother's house and did a neuro exam on her \"I tested for stroke symptoms because I had one and she smiled fine for me and knew who she was and everything was fine, but something just seemed off about her.\" Pt daughter reports that Pt BG was 345 at home and \"had been eating a lot of sweet stuff with her other daughter.\"      Nenita Edgar RN  07/10/19 2923    "

## 2019-07-11 NOTE — PROGRESS NOTES
Discharge Planning Assessment   Misael     Patient Name: Shruthi Walker  MRN: [id number]  Today's Date: 7/11/2019    Admit Date: 7/10/2019      Discharge Plan     Row Name 07/11/19 1113       Plan    Plan  Pt admitted on 7/10/19.  SS received consult per [facility] for discharge planning.  SS spoke with pt on this date.  Pt lives at home alone and plans to return home at disharge.  Pt does not utilize home health services.  Pt currently utilizes cane via [facility] provider.  Pt's PCP is Dr. Cason.  SS will follow and assist with discharge needs.            Row Name 07/11/19 1112       General Information    Admission Type  inpatient    Referral Source  nursing    Reason for Consult  discharge planning    Preferred Language  English           Shadia Harry

## 2019-07-11 NOTE — NURSING NOTE
Patient daughter, Charity, called for update. After password verified plan of care reviewed.  Daughter advised that MD has not made rounds as of yet.  She then asked to be transferred to patient room.

## 2019-07-11 NOTE — ED NOTES
Pt is leaving at this time en route to 3S. Pt is leaving A&OX4, hard of hearing per Pt baseline, RR even and unlabored, skin WPD. Pt is leaving with vitals WNL prior to departure, noted to be NS on the monitor with HR noted to be 72. Pt is leaving with #20 gauge to left ac clean, dry, intact, saline locked. Pt is leaving with family at bedside. Pt is leaving not appearing in any acute distress.      Nenita Edgar, RN  07/11/19 0013

## 2019-07-11 NOTE — THERAPY EVALUATION
Acute Care - Physical Therapy Initial Evaluation   Misael     Patient Name: Shruthi Walker  : 1929  MRN: [id number]  Today's Date: 2019   Onset of Illness/Injury or Date of Surgery: 07/10/19  Date of Referral to PT: 07/10/19  Referring Physician: Dr. Sharma      Admit Date: 7/10/2019    Visit Dx:     ICD-10-CM ICD-9-CM   1. Acute lower UTI N39.0 599.0   2. Altered mental status, unspecified altered mental status type R41.82 780.97     Patient Active Problem List   Diagnosis   • UTI (urinary tract infection)     Past Medical History:   Diagnosis Date   • Diabetes mellitus (CMS/HCC)    • MDS (myelodysplastic syndrome) (CMS/[facility])      Past Surgical History:   Procedure Laterality Date   • HYSTERECTOMY     • KNEE SURGERY          PT ASSESSMENT (last 12 hours)      Physical Therapy Evaluation     Row Name 19 1800          PT Evaluation Time/Intention    Subjective Information  no complaints  -BC     Document Type  evaluation  -BC     Mode of Treatment  individual therapy;physical therapy  -BC     Patient Effort  good  -BC     Symptoms Noted During/After Treatment  none  -BC     Row Name 19 1800          General Information    Patient Profile Reviewed?  yes  -BC     Onset of Illness/Injury or Date of Surgery  07/10/19  -BC     Referring Physician  Dr. Sharma  -BC     Patient Observations  alert;cooperative;agree to therapy  -BC     Prior Level of Function  independent:  -BC     Equipment Currently Used at Home  cane, straight  -BC     Risks Reviewed  patient:;LOB;nausea/vomiting;dizziness;increased discomfort;change in vital signs;increased drainage;lines disloged  -BC     Benefits Reviewed  patient:;improve function;increase independence;increase strength;increase balance;decrease pain;decrease risk of DVT;improve skin integrity;increase knowledge  -BC     Row Name 19 1800          Relationship/Environment    Lives With  alone  -BC     Row Name 19 1800          Cognitive  Assessment/Intervention- PT/OT    Orientation Status (Cognition)  oriented x 3  -BC     Follows Commands (Cognition)  follows one step commands  -BC     Row Name 07/11/19 1800          Mobility Assessment/Treatment    Extremity Weight-bearing Status  left lower extremity;right lower extremity  -BC     Left Lower Extremity (Weight-bearing Status)  full weight-bearing (FWB)  -BC     Right Lower Extremity (Weight-bearing Status)  full weight-bearing (FWB)  -BC     Row Name 07/11/19 1800          Bed Mobility Assessment/Treatment    Bed Mobility Assessment/Treatment  bed mobility (all) activities  -BC     [place] Level (Bed Mobility)  minimum assist (75% patient effort);1 person assist  -BC     Assistive Device (Bed Mobility)  bed rails  -BC     Row Name 07/11/19 1800          Transfer Assessment/Treatment    Transfer Assessment/Treatment  sit-stand transfer;stand-sit transfer  -BC     Maintains Weight-bearing Status (Transfers)  able to maintain  -BC     Sit-Stand [place] (Transfers)  contact guard  -BC     Stand-Sit [place] (Transfers)  contact guard  -BC     Row Name 07/11/19 1800          Sit-Stand Transfer    Assistive Device (Sit-Stand Transfers)  walker, front-wheeled  -BC     Row Name 07/11/19 1800          Stand-Sit Transfer    Assistive Device (Stand-Sit Transfers)  walker, front-wheeled  -BC     Row Name 07/11/19 1800          Gait/Stairs Assessment/Training    Gait/Stairs Assessment/Training  gait/ambulation independence  -BC     [place] Level (Gait)  contact guard  -BC     Assistive Device (Gait)  walker, front-wheeled  -BC     Distance in Feet (Gait)  250  -BC     Row Name 07/11/19 1800          General ROM    GENERAL ROM COMMENTS  WFL  -BC     Row Name 07/11/19 1800          MMT (Manual Muscle Testing)    General MMT Comments  WFL  -BC     Row Name 07/11/19 1800          Physical Therapy Clinical Impression    Date of Referral to PT  07/10/19  -BC     Functional Level at Time of  Evaluation (PT Clinical Impression)  good  -BC     Criteria for Skilled Interventions Met (PT Clinical Impression)  yes;treatment indicated  -BC     Rehab Potential (PT Clinical Summary)  good, to achieve stated therapy goals  -BC     Predicted Duration of Therapy (PT)  LOS  -BC     Row Name 07/11/19 1800          Physical Therapy Goals    Bed Mobility Goal Selection (PT)  bed mobility, PT goal 1  -BC     Transfer Goal Selection (PT)  transfer, PT goal 1  -BC     Gait Training Goal Selection (PT)  gait training, PT goal 1  -BC     Row Name 07/11/19 1800          Bed Mobility Goal 1 (PT)    Activity/Assistive Device (Bed Mobility Goal 1, PT)  bed mobility activities, all  -BC     [place]ray Level/Cues Needed (Bed Mobility Goal 1, PT)  conditional independence  -BC     Time Frame (Bed Mobility Goal 1, PT)  by discharge  -BC     Row Name 07/11/19 1800          Transfer Goal 1 (PT)    Activity/Assistive Device (Transfer Goal 1, PT)  transfers, all  -BC     Wray Level/Cues Needed (Transfer Goal 1, PT)  independent  -BC     Time Frame (Transfer Goal 1, PT)  by discharge  -BC     Row Name 07/11/19 1800          Gait Training Goal 1 (PT)    Activity/Assistive Device (Gait Training Goal 1, PT)  gait (walking locomotion)  -BC     Wray Level (Gait Training Goal 1, PT)  independent  -BC     Distance (Gait Goal 1, PT)  300  -BC     Time Frame (Gait Training Goal 1, PT)  by discharge  -BC     Row Name 07/11/19 1800          Positioning and Restraints    Pre-Treatment Position  in bed  -BC     Post Treatment Position  bed  -BC     In Bed  notified nsg;supine;call light within reach;encouraged to call for assist;side rails up x3  -BC       User Key  (r) = Recorded By, (t) = Taken By, (c) = Cosigned By    Initials Name Provider Type    BC Mana Cason, PT Physical Therapist        Physical Therapy Education     Title: PT OT SLP Therapies (Done)     Topic: Physical Therapy (Done)     Point: Mobility training  (Done)     Learning Progress Summary           Patient Acceptance, E, VU by BC at 7/11/2019  6:36 PM                   Point: Home exercise program (Done)     Learning Progress Summary           Patient Acceptance, E, VU by BC at 7/11/2019  6:36 PM                   Point: Body mechanics (Done)     Learning Progress Summary           Patient Acceptance, E, VU by BC at 7/11/2019  6:36 PM                   Point: Precautions (Done)     Learning Progress Summary           Patient Acceptance, E, VU by BC at 7/11/2019  6:36 PM                               User Key     Initials Effective Dates Name Provider Type Discipline    BC 03/14/16 -  Mana Cason PT Physical Therapist PT              PT Recommendation and Plan  Planned Therapy Interventions (PT Eval): balance training, bed mobility training, gait training, home exercise program, patient/family education, strengthening, transfer training  Therapy Frequency (PT Clinical Impression): (3-5xweek)        Time Calculation:   PT Charges     Row Name 07/11/19 1836             Time Calculation    PT Received On  07/11/19  -BC      PT Goal Re-Cert Due Date  07/25/19  -BC         Time Calculation- PT    Total Timed Code Minutes- PT  60 minute(s)  -BC         Timed Charges    24537 - Gait Training Minutes   15  -BC      83011 - PT Therapeutic Activity Minutes  15  -BC        User Key  (r) = Recorded By, (t) = Taken By, (c) = Cosigned By    Initials Name Provider Type    BC Mana Cason PT Physical Therapist        Therapy Charges for Today     Code Description Service Date Service Provider Modifiers Qty    30238106853 HC PT THERAPEUTIC ACT EA 15 MIN 7/11/2019 Mana Cason, PT GP 1    42340328676 HC GAIT TRAINING EA 15 MIN 7/11/2019 Mana Cason, PT GP 1    14784287993 HC PT EVAL MOD COMPLEXITY 2 7/11/2019 Mana Cason, PT GP 4    06870422093 HC PT THER SUPP EA 15 MIN 7/11/2019 Mana Cason, PT GP 4                 Mana Cason  PT  7/11/2019

## 2019-07-11 NOTE — ED PROVIDER NOTES
Subjective   This is a 89-year-old female who presents the emergency department with her daughter complaints of altered mental status x1 day.  Daughter states that when she called her mother this a.m. she was having trouble finding words and was confused. She reports patient had trouble making sense.  She also reports patient has had trouble with coordination losing her balance.  She does state that the symptoms seem to have improved.  Denies any focalized weakness, fever, chills, cough, chest pain, shortness of breath.  Denies any history of hypertension, cardiac disease.  Patient does have history of diabetes.        History provided by:  Patient   used: No    Altered Mental Status   Presenting symptoms: confusion and disorientation    Severity:  Moderate  Most recent episode:  Today  Episode history:  Single  Duration:  2 hours  Timing:  Intermittent  Progression:  Improving  Chronicity:  New  Context: not alcohol use, not dementia, not drug use, taking medications as prescribed, not nursing home resident and not recent change in medication    Associated symptoms: no abdominal pain, normal movement, no agitation, no bladder incontinence, no decreased appetite, no depression, no eye deviation, no fever, no hallucinations, no light-headedness, no rash, no seizures, no visual change and no vomiting        Review of Systems   Constitutional: Negative for decreased appetite and fever.   Eyes: Negative.    Respiratory: Negative.  Negative for apnea, cough, choking, chest tightness and shortness of breath.    Cardiovascular: Negative.    Gastrointestinal: Negative.  Negative for abdominal pain and vomiting.   Endocrine: Negative.    Genitourinary: Negative.  Negative for bladder incontinence.   Musculoskeletal: Negative.  Negative for arthralgias, back pain and gait problem.   Skin: Negative for rash.   Neurological: Negative for seizures and light-headedness.   Psychiatric/Behavioral: Positive for  confusion. Negative for agitation, decreased concentration and hallucinations. The patient is not nervous/anxious.    All other systems reviewed and are negative.      Past Medical History:   Diagnosis Date   • Diabetes mellitus (CMS/HCC)        No Known Allergies    History reviewed. No pertinent surgical history.    History reviewed. No pertinent family history.    Social History     Socioeconomic History   • Marital status:      Spouse name: Not on file   • Number of children: Not on file   • Years of education: Not on file   • Highest education level: Not on file   Tobacco Use   • Smoking status: Never Smoker   • Smokeless tobacco: Never Used   Substance and Sexual Activity   • Alcohol use: No     Frequency: Never   • Drug use: No   • Sexual activity: Defer           Objective   Physical Exam   Constitutional: She is oriented to person, place, and time. She appears well-developed and well-nourished.  Non-toxic appearance. She does not appear ill. No distress.   HENT:   Head: Normocephalic and atraumatic.   Mouth/Throat: Oropharynx is clear and moist.   Eyes: EOM are normal. Pupils are equal, round, and reactive to light. Right eye exhibits normal extraocular motion and no nystagmus. Left eye exhibits normal extraocular motion and no nystagmus. Right pupil is round and reactive. Left pupil is round and reactive. Pupils are equal.   Neck: Normal range of motion. Neck supple. No JVD present. No neck rigidity. No tracheal deviation present.   Cardiovascular: Normal rate. Exam reveals no gallop and no friction rub.   No murmur heard.  Pulmonary/Chest: Effort normal and breath sounds normal. No stridor. No respiratory distress. She has no wheezes. She has no rales.   Abdominal: Soft. Bowel sounds are normal. She exhibits no distension and no mass. There is no tenderness. There is no guarding.   Musculoskeletal: Normal range of motion. She exhibits no edema or tenderness.   Lymphadenopathy:     She has no  cervical adenopathy.   Neurological: She is alert and oriented to person, place, and time. She has normal strength. She is not disoriented. She displays normal reflexes. No cranial nerve deficit or sensory deficit. She displays a negative Romberg sign. Coordination normal. GCS eye subscore is 4. GCS verbal subscore is 5. GCS motor subscore is 6. She displays no Babinski's sign on the right side. She displays no Babinski's sign on the left side.   Skin: Skin is warm and dry. Capillary refill takes less than 2 seconds. No rash noted. She is not diaphoretic. No cyanosis or erythema. No pallor.   Psychiatric: She has a normal mood and affect. Her behavior is normal. Her mood appears not anxious. She is not agitated.   Nursing note and vitals reviewed.      Procedures           ED Course  ED Course as of Jul 10 2346   Wed Jul 10, 2019   2220 Dr. Abarca involved in patient care.   []   2249 CT scan of head shows no acute intracranial findings.  []   2250 Chest x-ray. Neg for acute findings  []   2331 I did discuss findings and care with patient's family as well as patient.  I did bring up concern for possible TIA.  Did suggest going to [facility] where they do have neurology available.  They requesting to stay close to home so patients family specifically daughter can visit.  Patient CT scan was negative for any acute stroke.  Patient has had no acute neuro deficits while in ER.     Patient was found to have an acute UTI which is most likely attributing to her altered mental status, balance problems.    I did discuss this case with Dr. Carrera who agrees to admit patient to telemetry for further evaluation.  []      ED Course User Index  [] Peter Peoples PA-C                  [facility]      Final diagnoses:   Acute lower UTI   Altered mental status, unspecified altered mental status type            Peter Poeples PA-C  07/10/19 0031

## 2019-07-11 NOTE — THERAPY EVALUATION
Acute Care - Speech Language Pathology   Swallow Initial Evaluation [facility]   CLINICAL DYSPHAGIA ASSESSMENT     Patient Name: Shruthi Walker  : 1929  MRN: [id number]  Today's Date: 2019             Admit Date: 7/10/2019    Visit Dx:     ICD-10-CM ICD-9-CM   1. Acute lower UTI N39.0 599.0   2. Altered mental status, unspecified altered mental status type R41.82 780.97     Patient Active Problem List   Diagnosis   • UTI (urinary tract infection)     Past Medical History:   Diagnosis Date   • Diabetes mellitus (CMS/HCC)    • MDS (myelodysplastic syndrome) (CMS/HCC)      Past Surgical History:   Procedure Laterality Date   • HYSTERECTOMY     • KNEE SURGERY       Shruthi Walker  is seen at bedside this pm on 3S to assess safety/efficacy of swallowing fnx, determine safest/least restrictive diet tolerance. She is admitted to [facility] w/ UTI.     Social History     Socioeconomic History   • Marital status:      Spouse name: Not on file   • Number of children: Not on file   • Years of education: Not on file   • Highest education level: Not on file   Tobacco Use   • Smoking status: Former Smoker   • Smokeless tobacco: Never Used   Substance and Sexual Activity   • Alcohol use: No     Frequency: Never   • Drug use: No   • Sexual activity: Defer      Chest xray 7/10/19 reveals no radiographic evidence of acute cardiac or pulmonary disease per radiologist.     Diet Orders (active) (From admission, onward)    Start     Ordered    19 0023  Diet Regular; Cardiac, Consistent Carbohydrate  Diet Effective Now      19 0022        Pt is observed on ra w/o complications.     Pt is positioned upright and centered on edge of bed to accept multiple po presentations of solid cracker, puree and thin liquids via spoon, cup and straw. She is able to self feed.     Facial/oral structures are symmetrical upon observation. Lingual protrusion reveals no deviation. Oral mucosa are moist, pink, and clean. Secretions are  clear, thin, and well controlled. OROM/FRANCISCO is wfl to imitate oral postures. Gag is not assessed. Volitional cough is intact w/ adequate  intensity, clear in quality, non-productive. Voice is adequate in intensity, clear in quality w/ intelligible speech. She is oriented to person, place and time, follows simple directives and participates in simple conversational exchanges.     Upon po presentations, adequate bolus anticipation and acceptance w/ good labial seal for bolus clearance via spoon bowl, cup rim stability and suction via straw. Bolus formation, manipulation and control are wfl w/ rotary mastication pattern. A-p transit is timely w/o oral residue. No overt s/s aspiration evidenced before the swallow.     Pharyngeal swallow is timely w/ adequate hyolaryngeal elevation per palpation. No overt s/s aspiration evidenced across this evaluation. No silent aspiration suspected as pt is w/o changes in vocal quality, respirations or secretions post po presentations. Pt denies odynophagia.    Impression: Per this assessment, Ms. Walker presents w/ wfl oropharyngeal swallow w/o s/s aspiration. No s/s indicative of silent aspiration. No odynophagia reported.      EDUCATION  The patient has been educated in the following areas:   Dysphagia (Swallowing Impairment).    SLP Recommendation and Plan  1. Regular consistency, thin liquids.    2. Meds whole in puree/thins.   3. Upright and centered for all po intake.  4. ARNOL precautions.  5. Oral care protocol.  No further formal SLP f/u warranted at this time.    D/w pt results and recommendations w/ verbal agreement.    Thank you for allowing me to participate in the care of your patient-  Indira Hernandez M.A., CCC-SLP    Plan of Care Reviewed With: patient  Plan of Care Review  Plan of Care Reviewed With: patient  Progress: no change    Time Calculation:     Therapy Charges for Today     Code Description Service Date Service Provider Modifiers Qty    85151961277 [facility]  EVAL ORAL PHARYNG SWALLOW 3 7/11/2019 Indira Hernandez MA,CCC-SLP GN 1        Indira Hernandez MA,CCC-SLP  7/11/2019

## 2019-07-11 NOTE — H&P
"Hospitalist History and Physical        Patient Identification  Name: Shruthi Walker  Age/Sex: 89 y.o. female  :  1929        MRN: [id number]  Visit Number: [id number]  PCP: Bryant Cason MD        Chief complaint altered mental status    History of Present Illness:  Patient is a 89 y.o. female who presents with reports of altered mental status per daughter. Her daughter states that she called the patient this evening and noticed her voice was off and she sounded confused. The patient herself recalls that when she woke up from a nap this evening, she felt uncoordinated on her feet and could barely walk. She also recalls her vision was blurry. She checked her glucose level at this time which was elevated in the 300s. She admits she had been eating some \"sweets\" with another daughter who was in visiting her today. Normally her glucose levels are well controlled. By the time the patient arrived to the ED, her confusion and change in speech pattern had resolved, as had her loss of coordination and blurry vision. She has been without complaints since. Upon further questioning, her urine has been more foul smelling in recent days. She denies dysuria but does report hesitancy which is new. In the ED, vitals were stable. Physical exam was felt to be non-focal. CT head showed no acute intracranial abnormality. Labs were fairly unremarkable with exception of UA which suggests UTI. Glucose was down to 237. She has been admitted to the telemetry floor for further management.     Review of Systems  Review of Systems   Constitutional: Negative for activity change, appetite change, chills, diaphoresis, fatigue, fever and unexpected weight change.   HENT: Negative for congestion, postnasal drip, rhinorrhea, sinus pressure, sinus pain and sore throat.    Eyes: Positive for visual disturbance (blurry vision). Negative for photophobia, pain, discharge, redness and itching.   Respiratory: Negative for cough, shortness of " breath and wheezing.    Cardiovascular: Negative for chest pain, palpitations and leg swelling.   Gastrointestinal: Negative for abdominal distention, abdominal pain, constipation, diarrhea, nausea and vomiting.   Endocrine: Negative for cold intolerance, heat intolerance, polydipsia, polyphagia and polyuria.   Genitourinary: Positive for difficulty urinating (hesitancy). Negative for decreased urine volume, dysuria, frequency and hematuria.   Musculoskeletal: Negative for arthralgias, back pain, joint swelling, myalgias, neck pain and neck stiffness.   Skin: Negative for color change, pallor, rash and wound.   Allergic/Immunologic: Negative for environmental allergies, food allergies and immunocompromised state.   Neurological: Negative for dizziness, tremors, seizures, syncope, weakness, light-headedness, numbness and headaches.        Loss of coordination in lower extremities   Hematological: Negative for adenopathy. Does not bruise/bleed easily.   Psychiatric/Behavioral: Negative for agitation, behavioral problems and confusion.       History  Past Medical History:   Diagnosis Date   • Diabetes mellitus (CMS/HCC)    • MDS (myelodysplastic syndrome) (CMS/[facility])      Past Surgical History:   Procedure Laterality Date   • HYSTERECTOMY     • KNEE SURGERY       History reviewed. No pertinent family history.  Social History     Tobacco Use   • Smoking status: Former Smoker   • Smokeless tobacco: Never Used   Substance Use Topics   • Alcohol use: No     Frequency: Never   • Drug use: No     Medications Prior to Admission   Medication Sig Dispense Refill Last Dose   • metFORMIN (GLUCOPHAGE) 500 MG tablet Take 500 mg by mouth 2 (Two) Times a Day With Meals.        Allergies:  Patient has no known allergies.    Objective     Vital Signs  Temp:  [97.4 °F (36.3 °C)-99.6 °F (37.6 °C)] 99.6 °F (37.6 °C)  Heart Rate:  [67-79] 79  Resp:  [16-20] 20  BP: (136-186)/(55-86) 186/76  Body mass index is 25.38 kg/m².    Physical  Exam:  Physical Exam   Constitutional: She is oriented to person, place, and time. She appears well-developed and well-nourished. No distress.   HENT:   Head: Normocephalic and atraumatic.   Mouth/Throat: Oropharynx is clear and moist.   Eyes: Conjunctivae are normal. Pupils are equal, round, and reactive to light.   Left exotropia   Neck: Normal range of motion. Neck supple. No JVD present.   Cardiovascular: Normal rate, regular rhythm, normal heart sounds and intact distal pulses.   No murmur heard.  Pulmonary/Chest: Effort normal and breath sounds normal. She has no wheezes. She has no rales. She exhibits no tenderness.   Abdominal: Soft. Bowel sounds are normal. She exhibits no distension. There is no tenderness.   Musculoskeletal: Normal range of motion. She exhibits no edema, tenderness or deformity.   Lymphadenopathy:     She has no cervical adenopathy.   Neurological: She is alert and oriented to person, place, and time.   No gross focal deficits. No facial droop. No slurred speech. Hard of hearing which is chronic.  strength 5/5 and symmetric. Lower extremity strength 5/5 and symmetric. Negative babinski.    Skin: Skin is warm and dry. Capillary refill takes less than 2 seconds. No rash noted. She is not diaphoretic. No erythema. No pallor.   Psychiatric: She has a normal mood and affect. Her behavior is normal. Judgment and thought content normal.         Results Review:       Lab Results:  Results from last 7 days   Lab Units 07/10/19  2051   WBC 10*3/mm3 4.36   HEMOGLOBIN g/dL 10.9*   PLATELETS 10*3/mm3 134*     Results from last 7 days   Lab Units 07/10/19  2051   CRP mg/dL 0.42     Results from last 7 days   Lab Units 07/10/19  2051   SODIUM mmol/L 141   POTASSIUM mmol/L 4.8   CHLORIDE mmol/L 103   CO2 mmol/L 23.4   BUN mg/dL 19   CREATININE mg/dL 0.86   CALCIUM mg/dL 9.5   GLUCOSE mg/dL 237*         Hemoglobin A1C   Date Value Ref Range Status   07/10/2019 7.60 (H) 4.80 - 5.60 % Final     Results  from last 7 days   Lab Units 07/10/19  2051   BILIRUBIN mg/dL 0.3   ALK PHOS U/L 94   AST (SGOT) U/L 18   ALT (SGPT) U/L 15     Results from last 7 days   Lab Units 07/10/19  2051   TROPONIN T ng/mL <0.010                   I have reviewed the patient's laboratory results.    Imaging:  Imaging Results (last 72 hours)     Procedure Component Value Units Date/Time    CT Head Without Contrast [[id number]] Updated:  07/10/19 2158    XR Chest 1 View [[id number]] Updated:  07/10/19 2109      CT head: no acute intracranial findings    Chest XR: no obvious infiltrate or other acute abnormality appreciated.    I have personally reviewed the patient's radiologic imaging.        EKG: NSR, HR 78, QTc marginally prolonged at 460, nonspecific ST and T wave abnormality, no overt ST changes to suggest acute ischemia appreciated.    I have personally reviewed the patient's EKG.        Assessment/Plan     - Transient neurologic deficits, described as confusion, blurry vision, change in tone of voice, and lack of coordination: symptoms were transient and now completely resolved. Differential includes TIA, symptomatic hyperglycemia, UTI. Treating UTI empirically with IV rocephin. Will give 1-time dose of baby ASA. Monitor BP and glucose closely moving forward. Neuro checks q4h. In the morning, evaluate further with MRI brain and carotid doppler. It was suggested in the ED that patient go to [facility] for further stroke workup at an accredited stroke center, but patient and family requested to stay close to home, understanding that if her symptoms are the result of cerebrovascular disease and her neurologic symptoms recur and worsen in severity, that her care could be delayed by staying at our facility rather than going on to [place].   - Type II DM, non insulin dependent with hyperglycemia: only on metformin at home. A1c 7.6% shows fair long-term control. Patient apparently binged on sweets today with daughter visiting from out  [facility]. Trace ketones in urine but also on trace glucose, and anion gap slightly increased at 14.6 but bicarb normal at 23.4. Not felt to be in DKA. Glucose 237 on arrival and now down to 191. Continue to monitor.  - Elevated BP, without history of hypertension. BP higher on first check on telemetry floor compared to ED. Asked RN to check again and continue to monitor closely.  - UTI: does not meet sepsis criteria at this time, WBC normal and CRP normal as well. Lactic acid pending. Continue IV rocephin for now while awaiting urine culture results. Blood cultures also ordered, pending collection.  - MDS: patient has mild anemia and thrombocytopenia. MCV significantly elevated. Check B12 and folate in AM. Iron profile does not suggest deficiency.     DVT Prophylaxis: SCDs    Estimated Length of Stay >2 midnights    CODE STATUS: FULL    I discussed the patient's findings, assessment and plan with the patient, her family at bedside, and her RN Jayshree who was present during my interview and exam.    * Patient is high risk due to UTI, transient neurologic deficits, hyperglycemia, advanced age     Chuy Carrera MD  07/11/19  1:03 AM

## 2019-07-11 NOTE — CONSULTS
Patient resting comfortably in bed, alert and pleasant.  Counseled patient on diabetes basic handout which discusses exactly what diabetes is and how it affects the body.  Counseled patient on complications of hyperglycemia and ways to prevent it.  Counseled patient on hypoglycemia and treatment by using the rule of 15.  Counseled patient on the importance of checking blood glucose levels frequently and keeping a log to take to all MD appointments.  Counseled patient on how to care for themselves during sick day.  Stressed the importance of healthy eating with a limit of carbohydrates to 180 per day.  Counseled on importance of complying with medications as ordered by provider.  Counseled patient to seek medical attention if blood glucose above 300.  Counseled on importance of daily exercise. Patient verbalizes understanding of all information given. Encouraged patient to attend an outpatient diabetes smart class or attend diabetes support  group.  Left time and dates of classes with patient along with my name and contact information, will continue to monitor patient as needed.

## 2019-07-11 NOTE — PLAN OF CARE
Problem: Patient Care Overview  Goal: Plan of Care Review  Outcome: Ongoing (interventions implemented as appropriate)      Problem: Fall Risk (Adult)  Goal: Identify Related Risk Factors and Signs and Symptoms  Outcome: Outcome(s) achieved Date Met: 07/11/19    Goal: Absence of Fall  Outcome: Ongoing (interventions implemented as appropriate)      Problem: Urinary Tract Infection (Adult)  Goal: Signs and Symptoms of Listed Potential Problems Will be Absent, Minimized or Managed (Urinary Tract Infection)  Outcome: Ongoing (interventions implemented as appropriate)

## 2019-07-11 NOTE — PROGRESS NOTES
*Patient seen and examined reviewed her condition was admitted earlier this morning for transient confusional state that completely resolved  *Denied any lateralization in terms of motor or sensory deficit even during that episode but she was slurring her words and unable to think straight per her words  *Examination did not reveal any new lateralization cranial nerves II through XII are unremarkable motor or sensory exam unremarkable reflexes +2 diffusely    *Metabolic encephalopathy due to underlying UTI  --With absence of focal neurological deficit will DC the CVA work-up  --Continue ceftriaxone and monitor mental status  --Follow-up on the urine cultures

## 2019-07-11 NOTE — PLAN OF CARE
Problem: Patient Care Overview  Goal: Plan of Care Review  Outcome: Ongoing (interventions implemented as appropriate)    Goal: Individualization and Mutuality  Outcome: Ongoing (interventions implemented as appropriate)    Goal: Discharge Needs Assessment  Outcome: Ongoing (interventions implemented as appropriate)    Goal: Interprofessional Rounds/Family Conf  Outcome: Ongoing (interventions implemented as appropriate)      Problem: Fall Risk (Adult)  Goal: Identify Related Risk Factors and Signs and Symptoms  Outcome: Ongoing (interventions implemented as appropriate)    Goal: Absence of Fall  Outcome: Ongoing (interventions implemented as appropriate)      Problem: Urinary Tract Infection (Adult)  Goal: Signs and Symptoms of Listed Potential Problems Will be Absent, Minimized or Managed (Urinary Tract Infection)  Outcome: Ongoing (interventions implemented as appropriate)

## 2019-07-11 NOTE — PLAN OF CARE
Problem: Patient Care Overview  Goal: Plan of Care Review  Outcome: Outcome(s) achieved Date Met: 07/11/19 07/11/19 8825   Plan of Care Review   Progress no change   Coping/Psychosocial   Plan of Care Reviewed With Patient    Pt participated in clinical dysphagia assessment w/ wfl oropharyngeal swallow. No evidence of aspiration.

## 2019-07-12 NOTE — DISCHARGE PLACEMENT REQUEST
"Bradford Hwang (89 y.o. Female)     Date of Birth Social Security Number Address Home Phone MRN    07/05/1929  [address]   [place] [id number] [phone] [id number]    Jew Marital Status          Yarsanism        Admission Date Admission Type Admitting Provider Attending Provider Department, Room/Bed    7/10/19 Emergency Chuy Carrera MD Baibars, Mhd Motaz, MD [address], [address]    Discharge Date Discharge Disposition Discharge Destination         Home-Health Care [facility]              Attending Provider:  Ruthy Sharma MD    Allergies:  No Known Allergies    Isolation:  None   Infection:  None   Code Status:  CPR    Ht:  154.9 cm (61\")   Wt:  62.3 kg (137 lb 4 oz)    Admission Cmt:  None   Principal Problem:  None                Active Insurance as of 7/10/2019     Primary Coverage     Payor Plan Insurance Group Employer/Plan Group    MEDICARE MEDICARE A & B      Payor Plan Address Payor Plan Phone Number Payor Plan Fax Number Effective Dates    [address] [phone]  7/1/1994 - None Entered    [place] [address]       Subscriber Name Subscriber Birth Date Member ID       BRADFORD HWANG 7/5/1929 [id number]                 Emergency Contacts      (Rel.) Home Phone Work Phone Mobile Phone    Lamar Smith \"Brigida\" (Daughter) [phone] -- [phone]            Emergency Contact Information     Name Relation Home Work Mobile    Lamar Smith \"Brigida\" Daughter [phone]  [phone]          Insurance Information                MEDICARE/MEDICARE A & B Phone: [phone]    Subscriber: Bradford Hwang Subscriber#: [id number]    Group#:  Precert#:           Treatment Team     Provider Relationship Specialty Contact    Ruthy Sharma MD Attending, Physician of Record Internal Medicine [phone]    Nathan Peoples RN Registered Nurse --     Sofya Salguero [facility] [place] --     Jeannine Arredondo Respiratory Therapist --     Kim Romano, " "RN Registered Nurse -- [phone]          Problem List           Codes Noted - Resolved       Hospital    UTI (urinary tract infection) ICD-10-CM: N39.0  ICD-9-CM: 599.0 7/10/2019 - Present             History & Physical      Chuy Carrera MD at 2019  1:03 AM          Hospitalist History and Physical        Patient Identification  Name: Shruthi Walker  Age/Sex: 89 y.o. female  :  1929        MRN: [id number]  Visit Number: [id number]  PCP: Bryant Cason MD        Chief complaint altered mental status    History of Present Illness:  Patient is a 89 y.o. female who presents with reports of altered mental status per daughter. Her daughter states that she called the patient this evening and noticed her voice was off and she sounded confused. The patient herself recalls that when she woke up from a nap this evening, she felt uncoordinated on her feet and could barely walk. She also recalls her vision was blurry. She checked her glucose level at this time which was elevated in the 300s. She admits she had been eating some \"sweets\" with another daughter who was in visiting her today. Normally her glucose levels are well controlled. By the time the patient arrived to the ED, her confusion and change in speech pattern had resolved, as had her loss of coordination and blurry vision. She has been without complaints since. Upon further questioning, her urine has been more foul smelling in recent days. She denies dysuria but does report hesitancy which is new. In the ED, vitals were stable. Physical exam was felt to be non-focal. CT head showed no acute intracranial abnormality. Labs were fairly unremarkable with exception of UA which suggests UTI. Glucose was down to 237. She has been admitted to the telemetry floor for further management.     Review of Systems  Review of Systems   Constitutional: Negative for activity change, appetite change, chills, diaphoresis, fatigue, fever and unexpected weight " change.   HENT: Negative for congestion, postnasal drip, rhinorrhea, sinus pressure, sinus pain and sore throat.    Eyes: Positive for visual disturbance (blurry vision). Negative for photophobia, pain, discharge, redness and itching.   Respiratory: Negative for cough, shortness of breath and wheezing.    Cardiovascular: Negative for chest pain, palpitations and leg swelling.   Gastrointestinal: Negative for abdominal distention, abdominal pain, constipation, diarrhea, nausea and vomiting.   Endocrine: Negative for cold intolerance, heat intolerance, polydipsia, polyphagia and polyuria.   Genitourinary: Positive for difficulty urinating (hesitancy). Negative for decreased urine volume, dysuria, frequency and hematuria.   Musculoskeletal: Negative for arthralgias, back pain, joint swelling, myalgias, neck pain and neck stiffness.   Skin: Negative for color change, pallor, rash and wound.   Allergic/Immunologic: Negative for environmental allergies, food allergies and immunocompromised state.   Neurological: Negative for dizziness, tremors, seizures, syncope, weakness, light-headedness, numbness and headaches.        Loss of coordination in lower extremities   Hematological: Negative for adenopathy. Does not bruise/bleed easily.   Psychiatric/Behavioral: Negative for agitation, behavioral problems and confusion.       History  Past Medical History:   Diagnosis Date   • Diabetes mellitus (CMS/HCC)    • MDS (myelodysplastic syndrome) (CMS/HCC)      Past Surgical History:   Procedure Laterality Date   • HYSTERECTOMY     • KNEE SURGERY       History reviewed. No pertinent family history.  Social History     Tobacco Use   • Smoking status: Former Smoker   • Smokeless tobacco: Never Used   Substance Use Topics   • Alcohol use: No     Frequency: Never   • Drug use: No     Medications Prior to Admission   Medication Sig Dispense Refill Last Dose   • metFORMIN (GLUCOPHAGE) 500 MG tablet Take 500 mg by mouth 2 (Two) Times a Day  With Meals.        Allergies:  Patient has no known allergies.    Objective     Vital Signs  Temp:  [97.4 °F (36.3 °C)-99.6 °F (37.6 °C)] 99.6 °F (37.6 °C)  Heart Rate:  [67-79] 79  Resp:  [16-20] 20  BP: (136-186)/(55-86) 186/76  Body mass index is 25.38 kg/m².    Physical Exam:  Physical Exam   Constitutional: She is oriented to person, place, and time. She appears well-developed and well-nourished. No distress.   HENT:   Head: Normocephalic and atraumatic.   Mouth/Throat: Oropharynx is clear and moist.   Eyes: Conjunctivae are normal. Pupils are equal, round, and reactive to light.   Left exotropia   Neck: Normal range of motion. Neck supple. No JVD present.   Cardiovascular: Normal rate, regular rhythm, normal heart sounds and intact distal pulses.   No murmur heard.  Pulmonary/Chest: Effort normal and breath sounds normal. She has no wheezes. She has no rales. She exhibits no tenderness.   Abdominal: Soft. Bowel sounds are normal. She exhibits no distension. There is no tenderness.   Musculoskeletal: Normal range of motion. She exhibits no edema, tenderness or deformity.   Lymphadenopathy:     She has no cervical adenopathy.   Neurological: She is alert and oriented to person, place, and time.   No gross focal deficits. No facial droop. No slurred speech. Hard of hearing which is chronic.  strength 5/5 and symmetric. Lower extremity strength 5/5 and symmetric. Negative babinski.    Skin: Skin is warm and dry. Capillary refill takes less than 2 seconds. No rash noted. She is not diaphoretic. No erythema. No pallor.   Psychiatric: She has a normal mood and affect. Her behavior is normal. Judgment and thought content normal.         Results Review:       Lab Results:  Results from last 7 days   Lab Units 07/10/19  2051   WBC 10*3/mm3 4.36   HEMOGLOBIN g/dL 10.9*   PLATELETS 10*3/mm3 134*     Results from last 7 days   Lab Units 07/10/19  2051   CRP mg/dL 0.42     Results from last 7 days   Lab Units  07/10/19  2051   SODIUM mmol/L 141   POTASSIUM mmol/L 4.8   CHLORIDE mmol/L 103   CO2 mmol/L 23.4   BUN mg/dL 19   CREATININE mg/dL 0.86   CALCIUM mg/dL 9.5   GLUCOSE mg/dL 237*         Hemoglobin A1C   Date Value Ref Range Status   07/10/2019 7.60 (H) 4.80 - 5.60 % Final     Results from last 7 days   Lab Units 07/10/19  2051   BILIRUBIN mg/dL 0.3   ALK PHOS U/L 94   AST (SGOT) U/L 18   ALT (SGPT) U/L 15     Results from last 7 days   Lab Units 07/10/19  2051   TROPONIN T ng/mL <0.010                   I have reviewed the patient's laboratory results.    Imaging:  Imaging Results (last 72 hours)     Procedure Component Value Units Date/Time    CT Head Without Contrast [[id number]] Updated:  07/10/19 2158    XR Chest 1 View [[id number]] Updated:  07/10/19 2109      CT head: no acute intracranial findings    Chest XR: no obvious infiltrate or other acute abnormality appreciated.    I have personally reviewed the patient's radiologic imaging.        EKG: NSR, HR 78, QTc marginally prolonged at 460, nonspecific ST and T wave abnormality, no overt ST changes to suggest acute ischemia appreciated.    I have personally reviewed the patient's EKG.        Assessment/Plan     - Transient neurologic deficits, described as confusion, blurry vision, change in tone of voice, and lack of coordination: symptoms were transient and now completely resolved. Differential includes TIA, symptomatic hyperglycemia, UTI. Treating UTI empirically with IV rocephin. Will give 1-time dose of baby ASA. Monitor BP and glucose closely moving forward. Neuro checks q4h. In the morning, evaluate further with MRI brain and carotid doppler. It was suggested in the ED that patient go to [facility] for further stroke workup at an accredited stroke center, but patient and family requested to stay close to home, understanding that if her symptoms are the result of cerebrovascular disease and her neurologic symptoms recur and worsen in severity, that  her care could be delayed by staying at our facility rather than going on to [facility].   - Type II DM, non insulin dependent with hyperglycemia: only on metformin at home. A1c 7.6% shows fair long-term control. Patient apparently binged on sweets today with daughter visiting from out of town. Trace ketones in urine but also on trace glucose, and anion gap slightly increased at 14.6 but bicarb normal at 23.4. Not felt to be in DKA. Glucose 237 on arrival and now down to 191. Continue to monitor.  - Elevated BP, without history of hypertension. BP higher on first check on telemetry floor compared to ED. Asked RN to check again and continue to monitor closely.  - UTI: does not meet sepsis criteria at this time, WBC normal and CRP normal as well. Lactic acid pending. Continue IV rocephin for now while awaiting urine culture results. Blood cultures also ordered, pending collection.  - MDS: patient has mild anemia and thrombocytopenia. MCV significantly elevated. Check B12 and folate in AM. Iron profile does not suggest deficiency.     DVT Prophylaxis: SCDs    Estimated Length of Stay >2 midnights    CODE STATUS: FULL    I discussed the patient's findings, assessment and plan with the patient, her family at bedside, and her RN Jayshree who was present during my interview and exam.    * Patient is high risk due to UTI, transient neurologic deficits, hyperglycemia, advanced age     Chuy Carrera MD  07/11/19  1:03 AM      Electronically signed by Chuy Carrera MD at 7/11/2019  1:30 AM       Lines, Drains & Airways    Active LDAs     None                Hospital Medications (active)       Dose Frequency Start End    acetaminophen (TYLENOL) tablet 650 mg 650 mg Every 6 Hours PRN 7/12/2019     Sig - Route: Take 2 tablets by mouth Every 6 (Six) Hours As Needed for Mild Pain . - Oral    aspirin EC tablet 81 mg 81 mg Daily 7/11/2019     Sig - Route: Take 1 tablet by mouth Daily. - Oral    cefTRIAXone (ROCEPHIN) 1  g/100 mL 0.9% NS (MBP) 1 g Every 24 Hours 7/11/2019 7/16/2019    Sig - Route: Infuse 100 mL into a venous catheter Daily. - Intravenous    dextrose (D50W) 25 g/ 50mL Intravenous Solution 25 g 25 g Every 15 Minutes PRN 7/10/2019     Sig - Route: Infuse 50 mL into a venous catheter Every 15 (Fifteen) Minutes As Needed for Low Blood Sugar (Blood Sugar Less Than 70). - Intravenous    dextrose (GLUTOSE) oral gel 15 g 15 g Every 15 Minutes PRN 7/10/2019     Sig - Route: Take 15 application by mouth Every 15 (Fifteen) Minutes As Needed for Low Blood Sugar (Blood sugar less than 70). - Oral    glucagon (human recombinant) (GLUCAGEN DIAGNOSTIC) injection 1 mg 1 mg As Needed 7/10/2019     Sig - Route: Inject 1 mg under the skin into the appropriate area as directed As Needed for Low Blood Sugar (Blood Glucose Less Than 70). - Subcutaneous    insulin aspart (novoLOG) injection 0-7 Units 0-7 Units 4 Times Daily Before Meals & Nightly 7/10/2019     Sig - Route: Inject 0-7 Units under the skin into the appropriate area as directed 4 (Four) Times a Day Before Meals & at Bedtime. - Subcutaneous    lactobacillus acidophilus (RISAQUAD) capsule 1 capsule 1 capsule Daily 7/11/2019     Sig - Route: Take 1 capsule by mouth Daily. - Oral    nitroglycerin (NITROSTAT) SL tablet 0.4 mg 0.4 mg Every 5 Minutes PRN 7/11/2019     Sig - Route: Place 1 tablet under the tongue Every 5 (Five) Minutes As Needed for Chest Pain (Chest Pain With Systolic Blood Pressure Greater Than 100). - Sublingual    sodium chloride 0.9 % flush 10 mL 10 mL As Needed 7/10/2019     Sig - Route: Infuse 10 mL into a venous catheter As Needed for Line Care. - Intravenous    Cosign for Ordering: Accepted by Leo Sarkar MD on 7/11/2019  9:00 PM    sodium chloride 0.9 % flush 3 mL 3 mL Every 12 Hours Scheduled 7/11/2019     Sig - Route: Infuse 3 mL into a venous catheter Every 12 (Twelve) Hours. - Intravenous    sodium chloride 0.9 % flush 3-10 mL 3-10 mL As  Needed 7/11/2019     Sig - Route: Infuse 3-10 mL into a venous catheter As Needed for Line Care. - Intravenous            Lab Results (last 24 hours)     Procedure Component Value Units Date/Time    POC Glucose Once [[id number]]  (Abnormal) Collected:  07/12/19 1100    Specimen:  Blood Updated:  07/12/19 1114     Glucose 283 mg/dL     POC Glucose Once [[id number]]  (Abnormal) Collected:  07/12/19 0648    Specimen:  Blood Updated:  07/12/19 0659     Glucose 221 mg/dL     Urine Culture - Urine, Urine, Clean Catch [[id number]]  (Abnormal)  (Susceptibility) Collected:  07/10/19 2051    Specimen:  Urine, Clean Catch Updated:  07/12/19 0351     Urine Culture >100,000 CFU/mL Escherichia coli    Susceptibility      Escherichia coli     PATRICIA     Ampicillin Susceptible     Ampicillin + Sulbactam Susceptible     Cefazolin Susceptible     Cefepime Susceptible     Ceftazidime Susceptible     Ceftriaxone Susceptible     Gentamicin Susceptible     Levofloxacin Susceptible     Nitrofurantoin Susceptible     Piperacillin + Tazobactam Susceptible     Tetracycline Susceptible     Trimethoprim + Sulfamethoxazole Susceptible                    Blood Culture - Blood, Arm, Right [[id number]] Collected:  07/11/19 0105    Specimen:  Blood from Arm, Right Updated:  07/12/19 0222     Blood Culture No growth at 24 hours    Blood Culture - Blood, Arm, Left [[id number]] Collected:  07/11/19 0105    Specimen:  Blood from Arm, Left Updated:  07/12/19 0222     Blood Culture No growth at 24 hours    POC Glucose Once [[id number]]  (Abnormal) Collected:  07/11/19 1845    Specimen:  Blood Updated:  07/11/19 1900     Glucose 244 mg/dL     POC Glucose Once [[id number]]  (Abnormal) Collected:  07/11/19 1633    Specimen:  Blood Updated:  07/11/19 1656     Glucose 168 mg/dL         Orders (last 24 hrs)     Start     Ordered    07/12/19 1411  Discontinue IV  Once      07/12/19 1411    07/12/19 1406  Discontinue IV  Once      07/12/19 1407    07/12/19 1405  Discharge  patient  Once      07/12/19 1407    07/12/19 1115  POC Glucose Once  Once      07/12/19 1100    07/12/19 0700  POC Glucose Once  Once      07/12/19 0648    07/12/19 0017  acetaminophen (TYLENOL) tablet 650 mg  Every 6 Hours PRN      07/12/19 0017    07/12/19 0000  aspirin 81 MG EC tablet  Daily      07/12/19 1407    07/12/19 0000  metFORMIN (GLUCOPHAGE) 500 MG tablet  2 Times Daily With Meals      07/12/19 1407    07/12/19 0000  sulfamethoxazole-trimethoprim (BACTRIM DS) 800-160 MG per tablet  2 Times Daily      07/12/19 1407    07/12/19 0000  Discharge Follow-up with PCP      07/12/19 1407    07/12/19 0000  Diet: Consistent Carbohydrate      07/12/19 1407    07/12/19 0000  Activity as Tolerated      07/12/19 1407    07/12/19 0000  Referral to Home Health      07/12/19 1407    07/12/19 0000  Walker      07/12/19 1411    07/12/19 0000  Commode Chair      07/12/19 1411    07/11/19 2300  cefTRIAXone (ROCEPHIN) 1 g/100 mL 0.9% NS (MBP)  Every 24 Hours      07/11/19 0022    07/11/19 1901  POC Glucose Once  Once      07/11/19 1845    07/11/19 1800  lactobacillus acidophilus (RISAQUAD) capsule 1 capsule  Daily      07/11/19 1451    07/11/19 1657  POC Glucose Once  Once      07/11/19 1633    07/11/19 0215  aspirin EC tablet 81 mg  Daily      07/11/19 0117    07/11/19 0115  sodium chloride 0.9 % flush 3 mL  Every 12 Hours Scheduled      07/11/19 0022    07/11/19 0022  nitroglycerin (NITROSTAT) SL tablet 0.4 mg  Every 5 Minutes PRN      07/11/19 0022    07/11/19 0022  sodium chloride 0.9 % flush 3-10 mL  As Needed      07/11/19 0022    07/10/19 2350  insulin aspart (novoLOG) injection 0-7 Units  4 Times Daily Before Meals & Nightly      07/10/19 2348    07/10/19 2349  POC Glucose 4x Daily AC & at Bedtime  4 Times Daily Before Meals & at Bedtime      07/10/19 2348    07/10/19 2348  dextrose (GLUTOSE) oral gel 15 g  Every 15 Minutes PRN      07/10/19 2348    07/10/19 2348  dextrose (D50W) 25 g/ 50mL Intravenous Solution 25 g   Every 15 Minutes PRN      07/10/19 2348    07/10/19 2348  glucagon (human recombinant) (GLUCAGEN DIAGNOSTIC) injection 1 mg  As Needed      07/10/19 2348    07/10/19 2030  sodium chloride 0.9 % flush 10 mL  As Needed      07/10/19 2031    Unscheduled  ECG 12 Lead  As Needed     Comments:  Nurse to Release if Patient Expericences Acute Chest Pain or Dysrhythmias    19 002    Unscheduled  Potassium  As Needed     Comments:  For Ventricular Arrhythmias      19 002    Unscheduled  Magnesium  As Needed     Comments:  For Ventricular Arrhythmias      19    Unscheduled  Troponin  As Needed     Comments:  For Chest Pain      19 002    Unscheduled  Digoxin Level  As Needed     Comments:  For Atrial Arrhythmias      19    Unscheduled  Blood Gas, Arterial  As Needed     Comments:  Per O2 PolicyNotify Physician      19    Unscheduled  Up With Assistance  As Needed      19    --  metFORMIN (GLUCOPHAGE) 500 MG tablet  Daily With Breakfast,   Status:  Discontinued      07/10/19 2131    --  glipiZIDE (GLUCOTROL XL) 2.5 MG 24 hr tablet  Daily      19 1029          Operative/Procedure Notes (last 24 hours) (Notes from 2019  2:51 PM through 2019  2:51 PM)     No notes of this type exist for this encounter.        Physician Progress Notes (last 24 hours) (Notes from 2019  2:51 PM through 2019  2:51 PM)     No notes of this type exist for this encounter.        Consult Notes (last 24 hours) (Notes from 2019  2:51 PM through 2019  2:51 PM)     No notes of this type exist for this encounter.           Physical Therapy Notes (last 24 hours) (Notes from 2019  2:51 PM through 2019  2:51 PM)      Mana Cason, PT at 2019  6:37 PM  Version 1 of 1         Acute Care - Physical Therapy Initial Evaluation  [facility]     Patient Name: Shruthi Walker  : 1929  MRN: [id number]  Today's Date: 2019   Onset of  Illness/Injury or Date of Surgery: 07/10/19  Date of Referral to PT: 07/10/19  Referring Physician: Dr. Sharma      Admit Date: 7/10/2019    Visit Dx:     ICD-10-CM ICD-9-CM   1. Acute lower UTI N39.0 599.0   2. Altered mental status, unspecified altered mental status type R41.82 780.97     Patient Active Problem List   Diagnosis   • UTI (urinary tract infection)     Past Medical History:   Diagnosis Date   • Diabetes mellitus (CMS/HCC)    • MDS (myelodysplastic syndrome) (CMS/[facility])      Past Surgical History:   Procedure Laterality Date   • HYSTERECTOMY     • KNEE SURGERY          PT ASSESSMENT (last 12 hours)      Physical Therapy Evaluation     Row Name 07/11/19 1800          PT Evaluation Time/Intention    Subjective Information  no complaints  -BC     Document Type  evaluation  -BC     Mode of Treatment  individual therapy;physical therapy  -BC     Patient Effort  good  -BC     Symptoms Noted During/After Treatment  none  -BC     Row Name 07/11/19 1800          General Information    Patient Profile Reviewed?  yes  -BC     Onset of Illness/Injury or Date of Surgery  07/10/19  -BC     Referring Physician  Dr. Sharma  -BC     Patient Observations  alert;cooperative;agree to therapy  -BC     Prior Level of Function  independent:  -BC     Equipment Currently Used at Home  cane, straight  -BC     Risks Reviewed  patient:;LOB;nausea/vomiting;dizziness;increased discomfort;change in vital signs;increased drainage;lines disloged  -BC     Benefits Reviewed  patient:;improve function;increase independence;increase strength;increase balance;decrease pain;decrease risk of DVT;improve skin integrity;increase knowledge  -BC     Row Name 07/11/19 1800          Relationship/Environment    Lives With  alone  -BC     Row Name 07/11/19 1800          Cognitive Assessment/Intervention- PT/OT    Orientation Status (Cognition)  oriented x 3  -BC     Follows Commands (Cognition)  follows one step commands  -BC     Row Name 07/11/19  1800          Mobility Assessment/Treatment    Extremity Weight-bearing Status  left lower extremity;right lower extremity  -BC     Left Lower Extremity (Weight-bearing Status)  full weight-bearing (FWB)  -BC     Right Lower Extremity (Weight-bearing Status)  full weight-bearing (FWB)  -BC     Row Name 07/11/19 1800          Bed Mobility Assessment/Treatment    Bed Mobility Assessment/Treatment  bed mobility (all) activities  -BC     [place] Level (Bed Mobility)  minimum assist (75% patient effort);1 person assist  -BC     Assistive Device (Bed Mobility)  bed rails  -BC     Row Name 07/11/19 1800          Transfer Assessment/Treatment    Transfer Assessment/Treatment  sit-stand transfer;stand-sit transfer  -BC     Maintains Weight-bearing Status (Transfers)  able to maintain  -BC     Sit-Stand [place] (Transfers)  contact guard  -BC     Stand-Sit [place] (Transfers)  contact guard  -BC     Row Name 07/11/19 1800          Sit-Stand Transfer    Assistive Device (Sit-Stand Transfers)  walker, front-wheeled  -BC     Row Name 07/11/19 1800          Stand-Sit Transfer    Assistive Device (Stand-Sit Transfers)  walker, front-wheeled  -BC     Row Name 07/11/19 1800          Gait/Stairs Assessment/Training    Gait/Stairs Assessment/Training  gait/ambulation independence  -BC     [place] Level (Gait)  contact guard  -BC     Assistive Device (Gait)  walker, front-wheeled  -BC     Distance in Feet (Gait)  250  -BC     Row Name 07/11/19 1800          General ROM    GENERAL ROM COMMENTS  WFL  -BC     Row Name 07/11/19 1800          MMT (Manual Muscle Testing)    General MMT Comments  WFL  -BC     Row Name 07/11/19 1800          Physical Therapy Clinical Impression    Date of Referral to PT  07/10/19  -BC     Functional Level at Time of Evaluation (PT Clinical Impression)  good  -BC     Criteria for Skilled Interventions Met (PT Clinical Impression)  yes;treatment indicated  -BC     Rehab Potential (PT Clinical  Summary)  good, to achieve stated therapy goals  -BC     Predicted Duration of Therapy (PT)  LOS  -BC     Row Name 07/11/19 1800          Physical Therapy Goals    Bed Mobility Goal Selection (PT)  bed mobility, PT goal 1  -BC     Transfer Goal Selection (PT)  transfer, PT goal 1  -BC     Gait Training Goal Selection (PT)  gait training, PT goal 1  -BC     Row Name 07/11/19 1800          Bed Mobility Goal 1 (PT)    Activity/Assistive Device (Bed Mobility Goal 1, PT)  bed mobility activities, all  -BC     [place] Level/Cues Needed (Bed Mobility Goal 1, PT)  conditional independence  -BC     Time Frame (Bed Mobility Goal 1, PT)  by discharge  -BC     Row Name 07/11/19 1800          Transfer Goal 1 (PT)    Activity/Assistive Device (Transfer Goal 1, PT)  transfers, all  -BC     [place] Level/Cues Needed (Transfer Goal 1, PT)  independent  -BC     Time Frame (Transfer Goal 1, PT)  by discharge  -BC     Row Name 07/11/19 1800          Gait Training Goal 1 (PT)    Activity/Assistive Device (Gait Training Goal 1, PT)  gait (walking locomotion)  -BC     [place] Level (Gait Training Goal 1, PT)  independent  -BC     Distance (Gait Goal 1, PT)  300  -BC     Time Frame (Gait Training Goal 1, PT)  by discharge  -BC     Row Name 07/11/19 1800          Positioning and Restraints    Pre-Treatment Position  in bed  -BC     Post Treatment Position  bed  -BC     In Bed  notified nsg;supine;call light within reach;encouraged to call for assist;side rails up x3  -BC       User Key  (r) = Recorded By, (t) = Taken By, (c) = Cosigned By    Initials Name Provider Type    Mana Pryor PT Physical Therapist        Physical Therapy Education     Title: PT OT SLP Therapies (Done)     Topic: Physical Therapy (Done)     Point: Mobility training (Done)     Learning Progress Summary           Patient Acceptance, SULLY BATISTA by BC at 7/11/2019  6:36 PM                   Point: Home exercise program (Done)     Learning Progress  Summary           Patient Acceptance, E, VU by BC at 7/11/2019  6:36 PM                   Point: Body mechanics (Done)     Learning Progress Summary           Patient Acceptance, E, VU by BC at 7/11/2019  6:36 PM                   Point: Precautions (Done)     Learning Progress Summary           Patient Acceptance, E, VU by BC at 7/11/2019  6:36 PM                               User Key     Initials Effective Dates Name Provider Type [facility] 03/14/16 -  Mana Cason PT Physical Therapist PT              PT Recommendation and Plan  Planned Therapy Interventions (PT Eval): balance training, bed mobility training, gait training, home exercise program, patient/family education, strengthening, transfer training  Therapy Frequency (PT Clinical Impression): (3-5xweek)        Time Calculation:   PT Charges     Row Name 07/11/19 1836             Time Calculation    PT Received On  07/11/19  -BC      PT Goal Re-Cert Due Date  07/25/19  -BC         Time Calculation- PT    Total Timed Code Minutes- PT  60 minute(s)  -BC         Timed Charges    33901 - Gait Training Minutes   15  -BC      89809 - PT Therapeutic Activity Minutes  15  -BC        User Key  (r) = Recorded By, (t) = Taken By, (c) = Cosigned By    Initials Name Provider Type    BC Mana Cason, PT Physical Therapist        Therapy Charges for Today     Code Description Service Date Service Provider Modifiers Qty    90907011008 HC PT THERAPEUTIC ACT EA 15 MIN 7/11/2019 Mana Cason, PT GP 1    09380438275 HC GAIT TRAINING EA 15 MIN 7/11/2019 Mana Cason, PT GP 1    95019076846 HC PT EVAL MOD COMPLEXITY 2 7/11/2019 Mana Cason, PT GP 4    95232577779 HC PT THER SUPP EA 15 MIN 7/11/2019 Mana Cason, PT GP 4                 Mana Cason PT  7/11/2019         Electronically signed by Mana Cason PT at 7/11/2019  6:37 PM     Anup Santamaria PT at 7/12/2019  2:36 PM  Version 1 of 1         Acute Care - Physical  Therapy Treatment Note   [place]     Patient Name: Shruthi Walker  : 1929  MRN: [id number]  Today's Date: 2019  Onset of Illness/Injury or Date of Surgery: 07/10/19  Date of Referral to PT: 07/10/19  Referring Physician: Dr. Sharma    Admit Date: 7/10/2019    Visit Dx:    ICD-10-CM ICD-9-CM   1. Acute lower UTI N39.0 599.0   2. Altered mental status, unspecified altered mental status type R41.82 780.97     Patient Active Problem List   Diagnosis   • UTI (urinary tract infection)       Therapy Treatment    Rehabilitation Treatment Summary     Row Name 19 1400             Treatment Time/Intention    Discipline  physical therapist  -RT      Document Type  therapy note (daily note)  -RT      Subjective Information  no complaints  -RT      Mode of Treatment  individual therapy;physical therapy  -RT      Patient Effort  good  -RT      Comment  Pt amb 300 ft with rw cga/spv safely.  -RT      Recorded by [RT] Anup Santamaria, PT 19 1435      Row Name 19 1400             Bed Mobility Assessment/Treatment    Bed Mobility Assessment/Treatment  bed mobility (all) activities  -RT      [place] Level (Bed Mobility)  supervision  -RT      Recorded by [RT] Anup Santamaria, PT 19 1435      Row Name 19 1400             Sit-Stand Transfer    Sit-Stand [place] (Transfers)  contact guard  -RT      Assistive Device (Sit-Stand Transfers)  walker, front-wheeled  -RT      Recorded by [RT] Anup Santamaria, PT 19 1435      Row Name 19 1400             Gait/Stairs Assessment/Training    Gait/Stairs Assessment/Training  gait/ambulation independence  -RT      [place] Level (Gait)  contact guard  -RT      Assistive Device (Gait)  walker, front-wheeled  -RT      Distance in Feet (Gait)  300  -RT      Recorded by [RT] Anup Santamaria, PT 19 1435      Row Name 19 1400             Positioning and Restraints    Pre-Treatment Position  in bed  -RT      Post Treatment  Position  bed  -RT      In Bed  call light within reach;with family/caregiver  -RT      Recorded by [RT] Anup Santamaria, PT 07/12/19 9598        User Key  (r) = Recorded By, (t) = Taken By, (c) = Cosigned By    Initials Name Effective Dates Discipline    RT Anup Santamaria, PT 04/03/18 -  PT                   Physical Therapy Education     Title: PT OT SLP Therapies (Done)     Topic: Physical Therapy (Done)     Point: Mobility training (Done)     Learning Progress Summary           Patient Acceptance, E, DU by RT at 7/12/2019  2:35 PM    Acceptance, E, VU by BC at 7/11/2019  6:36 PM                   Point: Home exercise program (Done)     Learning Progress Summary           Patient Acceptance, E, DU by RT at 7/12/2019  2:35 PM    Acceptance, E, VU by BC at 7/11/2019  6:36 PM                   Point: Body mechanics (Done)     Learning Progress Summary           Patient Acceptance, E, DU by RT at 7/12/2019  2:35 PM    Acceptance, E, VU by BC at 7/11/2019  6:36 PM                   Point: Precautions (Done)     Learning Progress Summary           Patient Acceptance, E, DU by RT at 7/12/2019  2:35 PM    Acceptance, E, VU by BC at 7/11/2019  6:36 PM                               User Key     Initials Effective Dates Name Provider Type Discipline    BC 03/14/16 -  Mana Cason, PT Physical Therapist PT    RT 04/03/18 -  Anup Santamaria PT Physical Therapist PT                PT Recommendation and Plan     Plan of Care Reviewed With: (P) patient  Progress: (P) improving  Outcome Summary: (P) Pt increased gait dist to 300ft with rw cga.     Time Calculation:   PT Charges     Row Name 07/12/19 1436             Time Calculation    Start Time  -- 15  -RT        User Key  (r) = Recorded By, (t) = Taken By, (c) = Cosigned By    Initials Name Provider Type    RT Anup Santamaria, PT Physical Therapist        Therapy Charges for Today     Code Description Service Date Service Provider Modifiers Qty    76569887028   GAIT TRAINING EA 15 MIN 2019 Anup Santamaria PT GP 1               Anup Santamaria PT  2019         Electronically signed by Anup Santamaria PT at 2019  2:37 PM     Anup Santamaria PT at 2019  2:37 PM  Version 1 of 1         Problem: Patient Care Overview  Goal: Plan of Care Review  Outcome: Ongoing (interventions implemented as appropriate)   19 1435   Plan of Care Review   Progress improving   Coping/Psychosocial   Plan of Care Reviewed With patient   OTHER   Outcome Summary Pt increased gait dist to 300ft with rw cga.           Electronically signed by Anup Santamaria PT at 2019  2:37 PM       Occupational Therapy Notes (last 24 hours) (Notes from 2019  2:51 PM through 2019  2:51 PM)     No notes of this type exist for this encounter.             Discharge Summary      Ruthy Sharma MD at 2019  2:07 PM              [facility] HOSPITALISTS DISCHARGE SUMMARY    Patient Identification:  Name:  Shruthi Walker  Age:  89 y.o.  Sex:  female  :  1929  MRN:  [id number]  Visit Number:  [id number]    Date of Admission: 7/10/2019  Date of Discharge:  2019     PCP: Bryant Cason MD    DISCHARGE DIAGNOSIS  *Metabolic encephalopathy due to underlying E. coli UTI  *E coli UTI  *Diabetes mellitus type 2      HOSPITAL COURSE  Patient is a 89 y.o. female presented to [facility] complaining of altered mentation.  Please see the admitting history and physical for further details.  The patient was found to have metabolic encephalopathy due to underlying UTI and her mental status has returned back to complete baseline after treating with antibiotics.  The patient did not require CVA work-up due to her recovery.  She was asked to follow-up with her primary physician within the next week.        VITAL SIGNS:  Temp:  [97.3 °F (36.3 °C)-97.8 °F (36.6 °C)] 97.7 °F (36.5 °C)  Heart Rate:  [61-76] 76  Resp:  [18] 18  BP: (123-148)/(48-63)  123/61  SpO2:  [91 %-95 %] 93 %  on   ;   Device (Oxygen Therapy): room air    Body mass index is 25.93 kg/m².  Wt Readings from Last 3 Encounters:   07/12/19 62.3 kg (137 lb 4 oz)       PHYSICAL EXAM:  Constitutional:  Well-developed and well-nourished.  No respiratory distress.      HENT:  Head: Normocephalic and atraumatic.  Mouth:  Moist mucous membranes.    Eyes:  Conjunctivae and EOM are normal.  Pupils are equal, round, and reactive to light.  No scleral icterus.  Neck:  Neck supple.  No JVD present.    Cardiovascular:  Normal rate, regular rhythm and normal heart sounds with no murmur.  Pulmonary/Chest:  No respiratory distress, no wheezes, no crackles, with normal breath sounds and good air movement.  Abdominal:  Soft.  Bowel sounds are normal.  No distension and no tenderness.   Musculoskeletal:  No edema, no tenderness, and no deformity.  No red or swollen joints anywhere.    Neurological:  Alert and oriented to person, place, and time.  No cranial nerve deficit.  No tongue deviation.  No facial droop.  No slurred speech.   Skin:  Skin is warm and dry.  No rash noted.  No pallor.   Peripheral vascular:  No edema and strong pulses on all 4 extremities.      DISCHARGE DISPOSITION   Stable    DISCHARGE MEDICATIONS:     Discharge Medications      New Medications      Instructions Start Date   aspirin 81 MG EC tablet   81 mg, Oral, Daily      sulfamethoxazole-trimethoprim 800-160 MG per tablet  Commonly known as:  BACTRIM DS   1 tablet, Oral, 2 Times Daily         Changes to Medications      Instructions Start Date   metFORMIN 500 MG tablet  Commonly known as:  GLUCOPHAGE  What changed:  when to take this   500 mg, Oral, 2 Times Daily With Meals         Stop These Medications    glipiZIDE 2.5 MG 24 hr tablet  Commonly known as:  GLUCOTROL XL            Diet Instructions     Diet: Consistent Carbohydrate      Discharge Diet:  Consistent Carbohydrate        Activity Instructions     Activity as Tolerated           No future appointments.    Additional Instructions for the Follow-ups that You Need to Schedule     Discharge Follow-up with PCP   As directed       Currently Documented PCP:    Bryant Richardson MD    PCP Phone Number:    [phone]     Follow Up Details:  In 1 week         Referral to Home Health   As directed      Face to Face Visit Date:  7/12/2019    Follow-up Provider for Plan of Care?:  I treated the patient in an acute care facility and will not continue treatment after discharge.    Follow-up Provider:  BRYANT RICHARDSON [[id number]]    Reason/Clinical Findings:  Confusion    Describe mobility limitations that make leaving home difficult:  Elderly lady needs significant assistant    Nursing/Therapeutic Services Requested:  Skilled Nursing    Skilled nursing orders:  Medication education Mental health    Frequency:  1 Week 1           Follow-up Information     Bryant Richardson MD .    Specialty:  Family Medicine  Why:  In 1 week  Contact information:  [address]  [facility] [address]  [phone]             [facility] HOME C[facility] [place] REFERRAL .    Specialty:  Home Health Services  Contact information:  [address] [place], [place] [id number] [phone]                  TEST  RESULTS PENDING AT DISCHARGE   Order Current Status    Blood Culture - Blood, Arm, Left Preliminary result    Blood Culture - Blood, Arm, Right Preliminary result           CODE STATUS  Code Status and Medical Interventions:   Ordered at: 07/10/19 7714     Level Of Support Discussed With:    Patient     Code Status:    CPR     Medical Interventions (Level of Support Prior to Arrest):    Full       Ruthy Sharma MD  07/12/19  2:07 PM    Please note that this discharge summary required more than 30 minutes to complete.    Please send a copy of this dictation to the following providers:  Bryant Richardson MD      Electronically signed by Ruthy Sharma MD at 7/12/2019  2:09 PM        Discharge Order (From admission, onward)    Start     Ordered    07/12/19 1407  Discharge patient  Once     Expected Discharge Date:  07/12/19    [place] Dis[place] [facility]    Physician of Record for Attribution - Please select from Treatment Team:  CHLOÉ MONTERROSO [[id number]]    Review needed by CMO to determine Physician of Record:  No       Question Answer Comment   Physician of Record for Attribution - Please select from Treatment Team CHLOÉ MONTERROSO    Review needed by CMO to determine Physician of Record No        07/12/19 140

## 2019-07-12 NOTE — PLAN OF CARE
Problem: Patient Care Overview  Goal: Plan of Care Review  Outcome: Ongoing (interventions implemented as appropriate)   07/12/19 7018   Plan of Care Review   Progress improving   Coping/Psychosocial   Plan of Care Reviewed With patient   OTHER   Outcome Summary Pt increased gait dist to 300ft with rw cga.

## 2019-07-12 NOTE — DISCHARGE SUMMARY
[facility] HOSPITALISTS DISCHARGE SUMMARY    Patient Identification:  Name:  Shruthi Walker  Age:  89 y.o.  Sex:  female  :  1929  MRN:  [id number]  Visit Number:  [id number]    Date of Admission: 7/10/2019  Date of Discharge:  2019     PCP: Bryant Cason MD    DISCHARGE DIAGNOSIS  *Metabolic encephalopathy due to underlying E. coli UTI  *E coli UTI  *Diabetes mellitus type 2      HOSPITAL COURSE  Patient is a 89 y.o. female presented to [facility] complaining of altered mentation.  Please see the admitting history and physical for further details.  The patient was found to have metabolic encephalopathy due to underlying UTI and her mental status has returned back to complete baseline after treating with antibiotics.  The patient did not require CVA work-up due to her recovery.  She was asked to follow-up with her primary physician within the next week.        VITAL SIGNS:  Temp:  [97.3 °F (36.3 °C)-97.8 °F (36.6 °C)] 97.7 °F (36.5 °C)  Heart Rate:  [61-76] 76  Resp:  [18] 18  BP: (123-148)/(48-63) 123/61  SpO2:  [91 %-95 %] 93 %  on   ;   Device (Oxygen Therapy): room air    Body mass index is 25.93 kg/m².  Wt Readings from Last 3 Encounters:   19 62.3 kg (137 lb 4 oz)       PHYSICAL EXAM:  Constitutional:  Well-developed and well-nourished.  No respiratory distress.      HENT:  Head: Normocephalic and atraumatic.  Mouth:  Moist mucous membranes.    Eyes:  Conjunctivae and EOM are normal.  Pupils are equal, round, and reactive to light.  No scleral icterus.  Neck:  Neck supple.  No JVD present.    Cardiovascular:  Normal rate, regular rhythm and normal heart sounds with no murmur.  Pulmonary/Chest:  No respiratory distress, no wheezes, no crackles, with normal breath sounds and good air movement.  Abdominal:  Soft.  Bowel sounds are normal.  No distension and no tenderness.   Musculoskeletal:  No edema, no tenderness, and no deformity.  No red or swollen joints  anywhere.    Neurological:  Alert and oriented to person, place, and time.  No cranial nerve deficit.  No tongue deviation.  No facial droop.  No slurred speech.   Skin:  Skin is warm and dry.  No rash noted.  No pallor.   Peripheral vascular:  No edema and strong pulses on all 4 extremities.      DISCHARGE DISPOSITION   Stable    DISCHARGE MEDICATIONS:     Discharge Medications      New Medications      Instructions Start Date   aspirin 81 MG EC tablet   81 mg, Oral, Daily      sulfamethoxazole-trimethoprim 800-160 MG per tablet  Commonly known as:  BACTRIM DS   1 tablet, Oral, 2 Times Daily         Changes to Medications      Instructions Start Date   metFORMIN 500 MG tablet  Commonly known as:  GLUCOPHAGE  What changed:  when to take this   500 mg, Oral, 2 Times Daily With Meals         Stop These Medications    glipiZIDE 2.5 MG 24 hr tablet  Commonly known as:  GLUCOTROL XL            Diet Instructions     Diet: Consistent Carbohydrate      Discharge Diet:  Consistent Carbohydrate        Activity Instructions     Activity as Tolerated          No future appointments.    Additional Instructions for the Follow-ups that You Need to Schedule     Discharge Follow-up with PCP   As directed       Currently Documented PCP:    Bryant Richardson MD    PCP Phone Number:    [phone]     Follow Up Details:  In 1 week         Referral to Home Health   As directed      Face to Face Visit Date:  7/12/2019    Follow-up Provider for Plan of Care?:  I treated the patient in an acute care facility and will not continue treatment after discharge.    Follow-up Provider:  BRYANT RICHARDSON [[id number]]    Reason/Clinical Findings:  Confusion    Describe mobility limitations that make leaving home difficult:  Elderly lady needs significant assistant    Nursing/Therapeutic Services Requested:  Skilled Nursing    Skilled nursing orders:  Medication education Mental health    Frequency:  1 Week 1           Follow-up Information     Yoav  Bryant CANTU MD .    Specialty:  Family Medicine  Why:  In 1 week  Contact information:  [address]  [facility] [address] [phone]             [facility] HOME [facility] [place] REFERRAL .    Specialty:  Home Health Services  Contact information:  [address] [facility] [address] [phone]                  TEST  RESULTS PENDING AT DISCHARGE   Order Current Status    Blood Culture - Blood, Arm, Left Preliminary result    Blood Culture - Blood, Arm, Right Preliminary result           CODE STATUS  Code Status and Medical Interventions:   Ordered at: 07/10/19 2341     Level Of Support Discussed With:    Patient     Code Status:    CPR     Medical Interventions (Level of Support Prior to Arrest):    Full       Mhd Marychuy Sharma MD  07/12/19  2:07 PM    Please note that this discharge summary required more than 30 minutes to complete.    Please send a copy of this dictation to the following providers:  Bryant Cason MD

## 2019-07-12 NOTE — PLAN OF CARE
Problem: Urinary Tract Infection (Adult)  Goal: Signs and Symptoms of Listed Potential Problems Will be Absent, Minimized or Managed (Urinary Tract Infection)  Outcome: Ongoing (interventions implemented as appropriate)

## 2019-07-12 NOTE — PROGRESS NOTES
Discharge Planning Assessment   [place]     Patient Name: Shruthi Walker  MRN: [id number]  Today's Date: 7/12/2019    Admit Date: 7/10/2019        Discharge Plan     Row Name 07/12/19 1525       Plan    Final Discharge Disposition Code  06 - home with home health care    Final Note  Pt to be discharged home on this date with Physician ordered home health services.  Pt stated no preference.  SS arranged [facility] per Breonna.  RN to call report.  SS received consult to arrange bedside commode and rolling walker.  Pt family requested [facility].  SS arranged [facility] as per family request family will  supplies at agency.          Destination      No service coordination in this encounter.      Durable Medical Equipment      No service coordination in this encounter.      Dialysis/Infusion      No service coordination in this encounter.      Home Medical Care      No service coordination in this encounter.      Therapy      No service coordination in this encounter.      Community Resources      No service coordination in this encounter.        Expected Discharge Date and Time     Expected Discharge Date Expected Discharge Time    Jul 12, 2019         Demographic Summary    No documentation.       Functional Status    No documentation.       Psychosocial    No documentation.       Abuse/Neglect    No documentation.       Legal    No documentation.       Substance Abuse    No documentation.       Patient Forms    No documentation.           Shadia Harry

## 2019-07-12 NOTE — THERAPY TREATMENT NOTE
Acute Care - Physical Therapy Treatment Note   [place]     Patient Name: Shruthi Walker  : 1929  MRN: [id number]  Today's Date: 2019  Onset of Illness/Injury or Date of Surgery: 07/10/19  Date of Referral to PT: 07/10/19  Referring Physician: Dr. Sharma    Admit Date: 7/10/2019    Visit Dx:    ICD-10-CM ICD-9-CM   1. Acute lower UTI N39.0 599.0   2. Altered mental status, unspecified altered mental status type R41.82 780.97     Patient Active Problem List   Diagnosis   • UTI (urinary tract infection)       Therapy Treatment    Rehabilitation Treatment Summary     Row Name 19 1400             Treatment Time/Intention    Discipline  physical therapist  -RT      Document Type  therapy note (daily note)  -RT      Subjective Information  no complaints  -RT      Mode of Treatment  individual therapy;physical therapy  -RT      Patient Effort  good  -RT      Comment  Pt amb 300 ft with rw cga/spv safely.  -RT      Recorded by [RT] Anup Santamaria, PT 19 1435      Row Name 19 1400             Bed Mobility Assessment/Treatment    Bed Mobility Assessment/Treatment  bed mobility (all) activities  -RT      [place] Level (Bed Mobility)  supervision  -RT      Recorded by [RT] Anup Santamaria, PT 19 1435      Row Name 19 1400             Sit-Stand Transfer    Sit-Stand [place] (Transfers)  contact guard  -RT      Assistive Device (Sit-Stand Transfers)  walker, front-wheeled  -RT      Recorded by [RT] Anup Santamaria, PT 19 1435      Row Name 19 1400             Gait/Stairs Assessment/Training    Gait/Stairs Assessment/Training  gait/ambulation independence  -RT      [place] Level (Gait)  contact guard  -RT      Assistive Device (Gait)  walker, front-wheeled  -RT      Distance in Feet (Gait)  300  -RT      Recorded by [RT] Anup Santamaria, PT 19 1435      Row Name 19 1400             Positioning and Restraints    Pre-Treatment Position  in bed  -RT       Post Treatment Position  bed  -RT      In Bed  call light within reach;with family/caregiver  -RT      Recorded by [RT] Anup Santamaria, PT 07/12/19 2747        User Key  (r) = Recorded By, (t) = Taken By, (c) = Cosigned By    Initials Name Effective Dates Discipline    RT Anup Santamaria, PT 04/03/18 -  PT                   Physical Therapy Education     Title: PT OT SLP Therapies (Done)     Topic: Physical Therapy (Done)     Point: Mobility training (Done)     Learning Progress Summary           Patient Acceptance, E, DU by RT at 7/12/2019  2:35 PM    Acceptance, E, VU by BC at 7/11/2019  6:36 PM                   Point: Home exercise program (Done)     Learning Progress Summary           Patient Acceptance, E, DU by RT at 7/12/2019  2:35 PM    Acceptance, E, VU by BC at 7/11/2019  6:36 PM                   Point: Body mechanics (Done)     Learning Progress Summary           Patient Acceptance, E, DU by RT at 7/12/2019  2:35 PM    Acceptance, E, VU by BC at 7/11/2019  6:36 PM                   Point: Precautions (Done)     Learning Progress Summary           Patient Acceptance, E, DU by RT at 7/12/2019  2:35 PM    Acceptance, E, VU by BC at 7/11/2019  6:36 PM                               User Key     Initials Effective Dates Name Provider Type Discipline    BC 03/14/16 -  Mana Cason, PT Physical Therapist PT    RT 04/03/18 -  Anup Santamaria, PT Physical Therapist PT                PT Recommendation and Plan     Plan of Care Reviewed With: (P) patient  Progress: (P) improving  Outcome Summary: (P) Pt increased gait dist to 300ft with rw cga.     Time Calculation:   PT Charges     Row Name 07/12/19 1436             Time Calculation    Start Time  -- 15  -RT        User Key  (r) = Recorded By, (t) = Taken By, (c) = Cosigned By    Initials Name Provider Type    RT Anup Santamaria, PT Physical Therapist        Therapy Charges for Today     Code Description Service Date Service Provider Modifiers Qty     [id number]  GAIT TRAINING EA 15 MIN 7/12/2019 Anup Santamaria, PT GP 1               Anup Santamaria, PT  7/12/2019

## 2019-07-12 NOTE — PLAN OF CARE
Problem: Patient Care Overview  Goal: Plan of Care Review  Outcome: Ongoing (interventions implemented as appropriate)      Problem: Fall Risk (Adult)  Goal: Absence of Fall  Outcome: Ongoing (interventions implemented as appropriate)      Problem: Urinary Tract Infection (Adult)  Goal: Signs and Symptoms of Listed Potential Problems Will be Absent, Minimized or Managed (Urinary Tract Infection)  Outcome: Ongoing (interventions implemented as appropriate)

## 2019-07-12 NOTE — DISCHARGE PLACEMENT REQUEST
"Bradford Hwang (89 y.o. Female)     Date of Birth Social Security Number Address Home Phone MRN    07/05/1929  [address]   [place] [id number] [phone] [id number]    [employer] Marital Status          Restorationist        Admission Date Admission Type Admitting Provider Attending Provider Department, Room/Bed    7/10/19 Emergency Chuy Carrera MD Baibars, Mhd Motaz, MD [address], [address]S    Discharge Date Discharge Disposition Discharge Destination         Home-Health Care [facility]              Attending Provider:  Ruthy Sharma MD    Allergies:  No Known Allergies    Isolation:  None   Infection:  None   Code Status:  CPR    Ht:  154.9 cm (61\")   Wt:  62.3 kg (137 lb 4 oz)    Admission Cmt:  None   Principal Problem:  None                Active Insurance as of 7/10/2019     Primary Coverage     Payor Plan Insurance Group Employer/Plan Group    MEDICARE MEDICARE A & B      Payor Plan Address Payor Plan Phone Number Payor Plan Fax Number Effective Dates    [address] [phone]  7/1/1994 - None Entered    [place] [address]       Subscriber Name Subscriber Birth Date Member ID       BRADFORD HWANG 7/5/1929 [id number]                 Emergency Contacts      (Rel.) Home Phone Work Phone Mobile Phone    Lamar Smith \"Brigida\" (Daughter) [phone] -- [phone]            Emergency Contact Information     Name Relation Home Work Mobile    Lamar Smith \"Brigida\" Daughter [phone]  [phone]          Insurance Information                MEDICARE/MEDICARE A & B Phone: [phone]    Subscriber: Bradford Hwang Subscriber#: [id number]    Group#:  Precert#:           Treatment Team     Provider Relationship Specialty Contact    Ruthy Sharma MD Attending, Physician of Record Internal Medicine [phone]    Nathan Peoples RN Registered Nurse --     Sofya Salguero [facility] [place] --     Jeannine Arredondo Respiratory Therapist --     Kim Romano, " "RN Registered Nurse -- [phone]          Problem List           Codes Noted - Resolved       Hospital    UTI (urinary tract infection) ICD-10-CM: N39.0  ICD-9-CM: 599.0 7/10/2019 - Present             History & Physical      Chuy Carrera MD at 2019  1:03 AM          Hospitalist History and Physical        Patient Identification  Name: Shruthi Walker  Age/Sex: 89 y.o. female  :  1929        MRN: [id number]  Visit Number: [id number]  PCP: Bryant Cason MD        Chief complaint altered mental status    History of Present Illness:  Patient is a 89 y.o. female who presents with reports of altered mental status per daughter. Her daughter states that she called the patient this evening and noticed her voice was off and she sounded confused. The patient herself recalls that when she woke up from a nap this evening, she felt uncoordinated on her feet and could barely walk. She also recalls her vision was blurry. She checked her glucose level at this time which was elevated in the 300s. She admits she had been eating some \"sweets\" with another daughter who was in visiting her today. Normally her glucose levels are well controlled. By the time the patient arrived to the ED, her confusion and change in speech pattern had resolved, as had her loss of coordination and blurry vision. She has been without complaints since. Upon further questioning, her urine has been more foul smelling in recent days. She denies dysuria but does report hesitancy which is new. In the ED, vitals were stable. Physical exam was felt to be non-focal. CT head showed no acute intracranial abnormality. Labs were fairly unremarkable with exception of UA which suggests UTI. Glucose was down to 237. She has been admitted to the telemetry floor for further management.     Review of Systems  Review of Systems   Constitutional: Negative for activity change, appetite change, chills, diaphoresis, fatigue, fever and unexpected weight " change.   HENT: Negative for congestion, postnasal drip, rhinorrhea, sinus pressure, sinus pain and sore throat.    Eyes: Positive for visual disturbance (blurry vision). Negative for photophobia, pain, discharge, redness and itching.   Respiratory: Negative for cough, shortness of breath and wheezing.    Cardiovascular: Negative for chest pain, palpitations and leg swelling.   Gastrointestinal: Negative for abdominal distention, abdominal pain, constipation, diarrhea, nausea and vomiting.   Endocrine: Negative for cold intolerance, heat intolerance, polydipsia, polyphagia and polyuria.   Genitourinary: Positive for difficulty urinating (hesitancy). Negative for decreased urine volume, dysuria, frequency and hematuria.   Musculoskeletal: Negative for arthralgias, back pain, joint swelling, myalgias, neck pain and neck stiffness.   Skin: Negative for color change, pallor, rash and wound.   Allergic/Immunologic: Negative for environmental allergies, food allergies and immunocompromised state.   Neurological: Negative for dizziness, tremors, seizures, syncope, weakness, light-headedness, numbness and headaches.        Loss of coordination in lower extremities   Hematological: Negative for adenopathy. Does not bruise/bleed easily.   Psychiatric/Behavioral: Negative for agitation, behavioral problems and confusion.       History  Past Medical History:   Diagnosis Date   • Diabetes mellitus (CMS/HCC)    • MDS (myelodysplastic syndrome) (CMS/HCC)      Past Surgical History:   Procedure Laterality Date   • HYSTERECTOMY     • KNEE SURGERY       History reviewed. No pertinent family history.  Social History     Tobacco Use   • Smoking status: Former Smoker   • Smokeless tobacco: Never Used   Substance Use Topics   • Alcohol use: No     Frequency: Never   • Drug use: No     Medications Prior to Admission   Medication Sig Dispense Refill Last Dose   • metFORMIN (GLUCOPHAGE) 500 MG tablet Take 500 mg by mouth 2 (Two) Times a Day  With Meals.        Allergies:  Patient has no known allergies.    Objective     Vital Signs  Temp:  [97.4 °F (36.3 °C)-99.6 °F (37.6 °C)] 99.6 °F (37.6 °C)  Heart Rate:  [67-79] 79  Resp:  [16-20] 20  BP: (136-186)/(55-86) 186/76  Body mass index is 25.38 kg/m².    Physical Exam:  Physical Exam   Constitutional: She is oriented to person, place, and time. She appears well-developed and well-nourished. No distress.   HENT:   Head: Normocephalic and atraumatic.   Mouth/Throat: Oropharynx is clear and moist.   Eyes: Conjunctivae are normal. Pupils are equal, round, and reactive to light.   Left exotropia   Neck: Normal range of motion. Neck supple. No JVD present.   Cardiovascular: Normal rate, regular rhythm, normal heart sounds and intact distal pulses.   No murmur heard.  Pulmonary/Chest: Effort normal and breath sounds normal. She has no wheezes. She has no rales. She exhibits no tenderness.   Abdominal: Soft. Bowel sounds are normal. She exhibits no distension. There is no tenderness.   Musculoskeletal: Normal range of motion. She exhibits no edema, tenderness or deformity.   Lymphadenopathy:     She has no cervical adenopathy.   Neurological: She is alert and oriented to person, place, and time.   No gross focal deficits. No facial droop. No slurred speech. Hard of hearing which is chronic.  strength 5/5 and symmetric. Lower extremity strength 5/5 and symmetric. Negative babinski.    Skin: Skin is warm and dry. Capillary refill takes less than 2 seconds. No rash noted. She is not diaphoretic. No erythema. No pallor.   Psychiatric: She has a normal mood and affect. Her behavior is normal. Judgment and thought content normal.         Results Review:       Lab Results:  Results from last 7 days   Lab Units 07/10/19  2051   WBC 10*3/mm3 4.36   HEMOGLOBIN g/dL 10.9*   PLATELETS 10*3/mm3 134*     Results from last 7 days   Lab Units 07/10/19  2051   CRP mg/dL 0.42     Results from last 7 days   Lab Units  07/10/19  2051   SODIUM mmol/L 141   POTASSIUM mmol/L 4.8   CHLORIDE mmol/L 103   CO2 mmol/L 23.4   BUN mg/dL 19   CREATININE mg/dL 0.86   CALCIUM mg/dL 9.5   GLUCOSE mg/dL 237*         Hemoglobin A1C   Date Value Ref Range Status   07/10/2019 7.60 (H) 4.80 - 5.60 % Final     Results from last 7 days   Lab Units 07/10/19  2051   BILIRUBIN mg/dL 0.3   ALK PHOS U/L 94   AST (SGOT) U/L 18   ALT (SGPT) U/L 15     Results from last 7 days   Lab Units 07/10/19  2051   TROPONIN T ng/mL <0.010                   I have reviewed the patient's laboratory results.    Imaging:  Imaging Results (last 72 hours)     Procedure Component Value Units Date/Time    CT Head Without Contrast [[id number]] Updated:  07/10/19 2158    XR Chest 1 View [[id number]] Updated:  07/10/19 2109      CT head: no acute intracranial findings    Chest XR: no obvious infiltrate or other acute abnormality appreciated.    I have personally reviewed the patient's radiologic imaging.        EKG: NSR, HR 78, QTc marginally prolonged at 460, nonspecific ST and T wave abnormality, no overt ST changes to suggest acute ischemia appreciated.    I have personally reviewed the patient's EKG.        Assessment/Plan     - Transient neurologic deficits, described as confusion, blurry vision, change in tone of voice, and lack of coordination: symptoms were transient and now completely resolved. Differential includes TIA, symptomatic hyperglycemia, UTI. Treating UTI empirically with IV rocephin. Will give 1-time dose of baby ASA. Monitor BP and glucose closely moving forward. Neuro checks q4h. In the morning, evaluate further with MRI brain and carotid doppler. It was suggested in the ED that patient go to [facility] for further stroke workup at an accredited stroke center, but patient and family requested to stay close to home, understanding that if her symptoms are the result of cerebrovascular disease and her neurologic symptoms recur and worsen in severity, that  her care could be delayed by staying at our facility rather than going on to [place].   - Type II DM, non insulin dependent with hyperglycemia: only on metformin at home. A1c 7.6% shows fair long-term control. Patient apparently binged on sweets today with daughter visiting from out of town. Trace ketones in urine but also on trace glucose, and anion gap slightly increased at 14.6 but bicarb normal at 23.4. Not felt to be in DKA. Glucose 237 on arrival and now down to 191. Continue to monitor.  - Elevated BP, without history of hypertension. BP higher on first check on telemetry floor compared to ED. Asked RN to check again and continue to monitor closely.  - UTI: does not meet sepsis criteria at this time, WBC normal and CRP normal as well. Lactic acid pending. Continue IV rocephin for now while awaiting urine culture results. Blood cultures also ordered, pending collection.  - MDS: patient has mild anemia and thrombocytopenia. MCV significantly elevated. Check B12 and folate in AM. Iron profile does not suggest deficiency.     DVT Prophylaxis: SCDs    Estimated Length of Stay >2 midnights    CODE STATUS: FULL    I discussed the patient's findings, assessment and plan with the patient, her family at bedside, and her RN Jayshree who was present during my interview and exam.    * Patient is high risk due to UTI, transient neurologic deficits, hyperglycemia, advanced age     Chuy Carrera MD  07/11/19  1:03 AM      Electronically signed by Chuy Carrera MD at 7/11/2019  1:30 AM       Lines, Drains & Airways    Active LDAs     None                Hospital Medications (active)       Dose Frequency Start End    acetaminophen (TYLENOL) tablet 650 mg 650 mg Every 6 Hours PRN 7/12/2019     Sig - Route: Take 2 tablets by mouth Every 6 (Six) Hours As Needed for Mild Pain . - Oral    aspirin EC tablet 81 mg 81 mg Daily 7/11/2019     Sig - Route: Take 1 tablet by mouth Daily. - Oral    cefTRIAXone (ROCEPHIN) 1  g/100 mL 0.9% NS (MBP) 1 g Every 24 Hours 7/11/2019 7/16/2019    Sig - Route: Infuse 100 mL into a venous catheter Daily. - Intravenous    dextrose (D50W) 25 g/ 50mL Intravenous Solution 25 g 25 g Every 15 Minutes PRN 7/10/2019     Sig - Route: Infuse 50 mL into a venous catheter Every 15 (Fifteen) Minutes As Needed for Low Blood Sugar (Blood Sugar Less Than 70). - Intravenous    dextrose (GLUTOSE) oral gel 15 g 15 g Every 15 Minutes PRN 7/10/2019     Sig - Route: Take 15 application by mouth Every 15 (Fifteen) Minutes As Needed for Low Blood Sugar (Blood sugar less than 70). - Oral    glucagon (human recombinant) (GLUCAGEN DIAGNOSTIC) injection 1 mg 1 mg As Needed 7/10/2019     Sig - Route: Inject 1 mg under the skin into the appropriate area as directed As Needed for Low Blood Sugar (Blood Glucose Less Than 70). - Subcutaneous    insulin aspart (novoLOG) injection 0-7 Units 0-7 Units 4 Times Daily Before Meals & Nightly 7/10/2019     Sig - Route: Inject 0-7 Units under the skin into the appropriate area as directed 4 (Four) Times a Day Before Meals & at Bedtime. - Subcutaneous    lactobacillus acidophilus (RISAQUAD) capsule 1 capsule 1 capsule Daily 7/11/2019     Sig - Route: Take 1 capsule by mouth Daily. - Oral    nitroglycerin (NITROSTAT) SL tablet 0.4 mg 0.4 mg Every 5 Minutes PRN 7/11/2019     Sig - Route: Place 1 tablet under the tongue Every 5 (Five) Minutes As Needed for Chest Pain (Chest Pain With Systolic Blood Pressure Greater Than 100). - Sublingual    sodium chloride 0.9 % flush 10 mL 10 mL As Needed 7/10/2019     Sig - Route: Infuse 10 mL into a venous catheter As Needed for Line Care. - Intravenous    Cosign for Ordering: Accepted by Leo Sarkar MD on 7/11/2019  9:00 PM    sodium chloride 0.9 % flush 3 mL 3 mL Every 12 Hours Scheduled 7/11/2019     Sig - Route: Infuse 3 mL into a venous catheter Every 12 (Twelve) Hours. - Intravenous    sodium chloride 0.9 % flush 3-10 mL 3-10 mL As  Needed 7/11/2019     Sig - Route: Infuse 3-10 mL into a venous catheter As Needed for Line Care. - Intravenous            Lab Results (last 24 hours)     Procedure Component Value Units Date/Time    POC Glucose Once [[id number]]  (Abnormal) Collected:  07/12/19 1100    Specimen:  Blood Updated:  07/12/19 1114     Glucose 283 mg/dL     POC Glucose Once [[id number]]  (Abnormal) Collected:  07/12/19 0648    Specimen:  Blood Updated:  07/12/19 0659     Glucose 221 mg/dL     Urine Culture - Urine, Urine, Clean Catch [[id number]]  (Abnormal)  (Susceptibility) Collected:  07/10/19 2051    Specimen:  Urine, Clean Catch Updated:  07/12/19 0351     Urine Culture >100,000 CFU/mL Escherichia coli    Susceptibility      Escherichia coli     PATRICIA     Ampicillin Susceptible     Ampicillin + Sulbactam Susceptible     Cefazolin Susceptible     Cefepime Susceptible     Ceftazidime Susceptible     Ceftriaxone Susceptible     Gentamicin Susceptible     Levofloxacin Susceptible     Nitrofurantoin Susceptible     Piperacillin + Tazobactam Susceptible     Tetracycline Susceptible     Trimethoprim + Sulfamethoxazole Susceptible                    Blood Culture - Blood, Arm, Right [[id number]] Collected:  07/11/19 0105    Specimen:  Blood from Arm, Right Updated:  07/12/19 0222     Blood Culture No growth at 24 hours    Blood Culture - Blood, Arm, Left [[id number]] Collected:  07/11/19 0105    Specimen:  Blood from Arm, Left Updated:  07/12/19 0222     Blood Culture No growth at 24 hours    POC Glucose Once [[id number]]  (Abnormal) Collected:  07/11/19 1845    Specimen:  Blood Updated:  07/11/19 1900     Glucose 244 mg/dL     POC Glucose Once [[id number]]  (Abnormal) Collected:  07/11/19 1633    Specimen:  Blood Updated:  07/11/19 1656     Glucose 168 mg/dL         Orders (last 24 hrs)     Start     Ordered    07/12/19 1411  Discontinue IV  Once      07/12/19 1411    07/12/19 1406  Discontinue IV  Once      07/12/19 1407    07/12/19 1405  Discharge  patient  Once      07/12/19 1407    07/12/19 1115  POC Glucose Once  Once      07/12/19 1100    07/12/19 0700  POC Glucose Once  Once      07/12/19 0648    07/12/19 0017  acetaminophen (TYLENOL) tablet 650 mg  Every 6 Hours PRN      07/12/19 0017    07/12/19 0000  aspirin 81 MG EC tablet  Daily      07/12/19 1407    07/12/19 0000  metFORMIN (GLUCOPHAGE) 500 MG tablet  2 Times Daily With Meals      07/12/19 1407    07/12/19 0000  sulfamethoxazole-trimethoprim (BACTRIM DS) 800-160 MG per tablet  2 Times Daily      07/12/19 1407    07/12/19 0000  Discharge Follow-up with PCP      07/12/19 1407    07/12/19 0000  Diet: Consistent Carbohydrate      07/12/19 1407    07/12/19 0000  Activity as Tolerated      07/12/19 1407    07/12/19 0000  Referral to Home Health      07/12/19 1407    07/12/19 0000  Walker      07/12/19 1411    07/12/19 0000  Commode Chair      07/12/19 1411    07/11/19 2300  cefTRIAXone (ROCEPHIN) 1 g/100 mL 0.9% NS (MBP)  Every 24 Hours      07/11/19 0022    07/11/19 1901  POC Glucose Once  Once      07/11/19 1845    07/11/19 1800  lactobacillus acidophilus (RISAQUAD) capsule 1 capsule  Daily      07/11/19 1451    07/11/19 1657  POC Glucose Once  Once      07/11/19 1633    07/11/19 0215  aspirin EC tablet 81 mg  Daily      07/11/19 0117    07/11/19 0115  sodium chloride 0.9 % flush 3 mL  Every 12 Hours Scheduled      07/11/19 0022    07/11/19 0022  nitroglycerin (NITROSTAT) SL tablet 0.4 mg  Every 5 Minutes PRN      07/11/19 0022    07/11/19 0022  sodium chloride 0.9 % flush 3-10 mL  As Needed      07/11/19 0022    07/10/19 2350  insulin aspart (novoLOG) injection 0-7 Units  4 Times Daily Before Meals & Nightly      07/10/19 2348    07/10/19 2349  POC Glucose 4x Daily AC & at Bedtime  4 Times Daily Before Meals & at Bedtime      07/10/19 2348    07/10/19 2348  dextrose (GLUTOSE) oral gel 15 g  Every 15 Minutes PRN      07/10/19 2348    07/10/19 2348  dextrose (D50W) 25 g/ 50mL Intravenous Solution 25 g   Every 15 Minutes PRN      07/10/19 2348    07/10/19 2348  glucagon (human recombinant) (GLUCAGEN DIAGNOSTIC) injection 1 mg  As Needed      07/10/19 2348    07/10/19 2030  sodium chloride 0.9 % flush 10 mL  As Needed      07/10/19 2031    Unscheduled  ECG 12 Lead  As Needed     Comments:  Nurse to Release if Patient Expericences Acute Chest Pain or Dysrhythmias    07/11/19 0022    Unscheduled  Potassium  As Needed     Comments:  For Ventricular Arrhythmias      07/11/19 0022    Unscheduled  Magnesium  As Needed     Comments:  For Ventricular Arrhythmias      07/11/19 0022    Unscheduled  Troponin  As Needed     Comments:  For Chest Pain      07/11/19 0022    Unscheduled  Digoxin Level  As Needed     Comments:  For Atrial Arrhythmias      07/11/19 0022    Unscheduled  Blood Gas, Arterial  As Needed     Comments:  Per O2 PolicyNotify Physician      07/11/19 0022    Unscheduled  Up With Assistance  As Needed      07/11/19 0022    --  metFORMIN (GLUCOPHAGE) 500 MG tablet  Daily With Breakfast,   Status:  Discontinued      07/10/19 2131    --  glipiZIDE (GLUCOTROL XL) 2.5 MG 24 hr tablet  Daily      07/11/19 1029          Operative/Procedure Notes (last 24 hours) (Notes from 7/11/2019  3:23 PM through 7/12/2019  3:23 PM)     No notes of this type exist for this encounter.        Physician Progress Notes (last 24 hours) (Notes from 7/11/2019  3:23 PM through 7/12/2019  3:23 PM)     No notes of this type exist for this encounter.        Consult Notes (last 24 hours) (Notes from 7/11/2019  3:23 PM through 7/12/2019  3:23 PM)     No notes of this type exist for this encounter.        Nutrition Notes (last 24 hours) (Notes from 7/11/2019  3:23 PM through 7/12/2019  3:23 PM)     No notes of this type exist for this encounter.           Physical Therapy Notes (last 24 hours) (Notes from 7/11/2019  3:23 PM through 7/12/2019  3:23 PM)      Mana Cason, PT at 7/11/2019  6:37 PM  Version 1 of 1         Acute Care - Physical  Therapy Initial Evaluation   [facility]     Patient Name: Shruthi Walker  : 1929  MRN: [id number]  Today's Date: 2019   Onset of Illness/Injury or Date of Surgery: 07/10/19  Date of Referral to PT: 07/10/19  Referring Physician: Dr. Sharma      Admit Date: 7/10/2019    Visit Dx:     ICD-10-CM ICD-9-CM   1. Acute lower UTI N39.0 599.0   2. Altered mental status, unspecified altered mental status type R41.82 780.97     Patient Active Problem List   Diagnosis   • UTI (urinary tract infection)     Past Medical History:   Diagnosis Date   • Diabetes mellitus (CMS/HCC)    • MDS (myelodysplastic syndrome) (CMS/[facility])      Past Surgical History:   Procedure Laterality Date   • HYSTERECTOMY     • KNEE SURGERY          PT ASSESSMENT (last 12 hours)      Physical Therapy Evaluation     Row Name 19 1800          PT Evaluation Time/Intention    Subjective Information  no complaints  -BC     Document Type  evaluation  -BC     Mode of Treatment  individual therapy;physical therapy  -BC     Patient Effort  good  -BC     Symptoms Noted During/After Treatment  none  -BC     Row Name 19 1800          General Information    Patient Profile Reviewed?  yes  -BC     Onset of Illness/Injury or Date of Surgery  07/10/19  -BC     Referring Physician  Dr. Sharma  -BC     Patient Observations  alert;cooperative;agree to therapy  -BC     Prior Level of Function  independent:  -BC     Equipment Currently Used at Home  cane, straight  -BC     Risks Reviewed  patient:;LOB;nausea/vomiting;dizziness;increased discomfort;change in vital signs;increased drainage;lines disloged  -BC     Benefits Reviewed  patient:;improve function;increase independence;increase strength;increase balance;decrease pain;decrease risk of DVT;improve skin integrity;increase knowledge  -BC     Row Name 19 1800          Relationship/Environment    Lives With  alone  -BC     Row Name 19 1800          Cognitive Assessment/Intervention- PT/OT     Orientation Status (Cognition)  oriented x 3  -BC     Follows Commands (Cognition)  follows one step commands  -BC     Row Name 07/11/19 1800          Mobility Assessment/Treatment    Extremity Weight-bearing Status  left lower extremity;right lower extremity  -BC     Left Lower Extremity (Weight-bearing Status)  full weight-bearing (FWB)  -BC     Right Lower Extremity (Weight-bearing Status)  full weight-bearing (FWB)  -BC     Row Name 07/11/19 1800          Bed Mobility Assessment/Treatment    Bed Mobility Assessment/Treatment  bed mobility (all) activities  -BC     [place] Level (Bed Mobility)  minimum assist (75% patient effort);1 person assist  -BC     Assistive Device (Bed Mobility)  bed rails  -BC     Row Name 07/11/19 1800          Transfer Assessment/Treatment    Transfer Assessment/Treatment  sit-stand transfer;stand-sit transfer  -BC     Maintains Weight-bearing Status (Transfers)  able to maintain  -BC     Sit-Stand [place] (Transfers)  contact guard  -BC     Stand-Sit [place] (Transfers)  contact guard  -BC     Row Name 07/11/19 1800          Sit-Stand Transfer    Assistive Device (Sit-Stand Transfers)  walker, front-wheeled  -BC     Row Name 07/11/19 1800          Stand-Sit Transfer    Assistive Device (Stand-Sit Transfers)  walker, front-wheeled  -BC     Row Name 07/11/19 1800          Gait/Stairs Assessment/Training    Gait/Stairs Assessment/Training  gait/ambulation independence  -BC     [place] Level (Gait)  contact guard  -BC     Assistive Device (Gait)  walker, front-wheeled  -BC     Distance in Feet (Gait)  250  -BC     Row Name 07/11/19 1800          General ROM    GENERAL ROM COMMENTS  WFL  -BC     Row Name 07/11/19 1800          MMT (Manual Muscle Testing)    General MMT Comments  WFL  -BC     Row Name 07/11/19 1800          Physical Therapy Clinical Impression    Date of Referral to PT  07/10/19  -BC     Functional Level at Time of Evaluation (PT Clinical Impression)   good  -BC     Criteria for Skilled Interventions Met (PT Clinical Impression)  yes;treatment indicated  -BC     Rehab Potential (PT Clinical Summary)  good, to achieve stated therapy goals  -BC     Predicted Duration of Therapy (PT)  LOS  -BC     Row Name 07/11/19 1800          Physical Therapy Goals    Bed Mobility Goal Selection (PT)  bed mobility, PT goal 1  -BC     Transfer Goal Selection (PT)  transfer, PT goal 1  -BC     Gait Training Goal Selection (PT)  gait training, PT goal 1  -BC     Row Name 07/11/19 1800          Bed Mobility Goal 1 (PT)    Activity/Assistive Device (Bed Mobility Goal 1, PT)  bed mobility activities, all  -BC     [facility] Level/Cues Needed (Bed Mobility Goal 1, PT)  conditional independence  -BC     Time Frame (Bed Mobility Goal 1, PT)  by discharge  -BC     Row Name 07/11/19 1800          Transfer Goal 1 (PT)    Activity/Assistive Device (Transfer Goal 1, PT)  transfers, all  -BC     Hopkins Level/Cues Needed (Transfer Goal 1, PT)  independent  -BC     Time Frame (Transfer Goal 1, PT)  by discharge  -BC     Row Name 07/11/19 1800          Gait Training Goal 1 (PT)    Activity/Assistive Device (Gait Training Goal 1, PT)  gait (walking locomotion)  -BC     Hopkins Level (Gait Training Goal 1, PT)  independent  -BC     Distance (Gait Goal 1, PT)  300  -BC     Time Frame (Gait Training Goal 1, PT)  by discharge  -BC     Row Name 07/11/19 1800          Positioning and Restraints    Pre-Treatment Position  in bed  -BC     Post Treatment Position  bed  -BC     In Bed  notified nsg;supine;call light within reach;encouraged to call for assist;side rails up x3  -BC       User Key  (r) = Recorded By, (t) = Taken By, (c) = Cosigned By    Initials Name Provider Type    BC Mana Cason PT Physical Therapist        Physical Therapy Education     Title: PT OT SLP Therapies (Done)     Topic: Physical Therapy (Done)     Point: Mobility training (Done)     Learning Progress Summary            Patient Acceptance, E, VU by BC at 7/11/2019  6:36 PM                   Point: Home exercise program (Done)     Learning Progress Summary           Patient Acceptance, E, VU by BC at 7/11/2019  6:36 PM                   Point: Body mechanics (Done)     Learning Progress Summary           Patient Acceptance, E, VU by BC at 7/11/2019  6:36 PM                   Point: Precautions (Done)     Learning Progress Summary           Patient Acceptance, E, VU by BC at 7/11/2019  6:36 PM                               User Key     Initials Effective Dates Name Provider Type Discipline    BC 03/14/16 -  Mana Cason PT Physical Therapist PT              PT Recommendation and Plan  Planned Therapy Interventions (PT Eval): balance training, bed mobility training, gait training, home exercise program, patient/family education, strengthening, transfer training  Therapy Frequency (PT Clinical Impression): (3-5xweek)        Time Calculation:   PT Charges     Row Name 07/11/19 1836             Time Calculation    PT Received On  07/11/19  -BC      PT Goal Re-Cert Due Date  07/25/19  -BC         Time Calculation- PT    Total Timed Code Minutes- PT  60 minute(s)  -BC         Timed Charges    18434 - Gait Training Minutes   15  -BC      73135 - PT Therapeutic Activity Minutes  15  -BC        User Key  (r) = Recorded By, (t) = Taken By, (c) = Cosigned By    Initials Name Provider Type    BC Mana Cason PT Physical Therapist        Therapy Charges for Today     Code Description Service Date Service Provider Modifiers Qty    59970199031 HC PT THERAPEUTIC ACT EA 15 MIN 7/11/2019 Mana Cason, PT GP 1    43031726513 HC GAIT TRAINING EA 15 MIN 7/11/2019 Mana Cason, PT GP 1    85450520522 HC PT EVAL MOD COMPLEXITY 2 7/11/2019 Mana Cason, PT GP 4    44004812020 HC PT THER SUPP EA 15 MIN 7/11/2019 Mana Cason, PT GP 4                 Mana Cason PT  7/11/2019         Electronically signed by  Mana Cason, PT at 2019  6:37 PM     Anup Santamaria, PT at 2019  2:36 PM  Version 1 of 1         Acute Care - Physical Therapy Treatment Note  ER[facility]     Patient Name: Shruthi Walker  : 1929  MRN: [id number]  Today's Date: 2019  Onset of Illness/Injury or Date of Surgery: 07/10/19  Date of Referral to PT: 07/10/19  Referring Physician: Dr. Sharma    Admit Date: 7/10/2019    Visit Dx:    ICD-10-CM ICD-9-CM   1. Acute lower UTI N39.0 599.0   2. Altered mental status, unspecified altered mental status type R41.82 780.97     Patient Active Problem List   Diagnosis   • UTI (urinary tract infection)       Therapy Treatment    Rehabilitation Treatment Summary     Row Name 19 1400             Treatment Time/Intention    Discipline  physical therapist  -RT      Document Type  therapy note (daily note)  -RT      Subjective Information  no complaints  -RT      Mode of Treatment  individual therapy;physical therapy  -RT      Patient Effort  good  -RT      Comment  Pt amb 300 ft with rw cga/spv safely.  -RT      Recorded by [RT] Anup Santamaria, PT 19 1435      Row Name 19 1400             Bed Mobility Assessment/Treatment    Bed Mobility Assessment/Treatment  bed mobility (all) activities  -RT      [place] Level (Bed Mobility)  supervision  -RT      Recorded by [RT] Anup Santamaria, PT 19 1435      Row Name 19 1400             Sit-Stand Transfer    Sit-Stand [place] (Transfers)  contact guard  -RT      Assistive Device (Sit-Stand Transfers)  walker, front-wheeled  -RT      Recorded by [RT] Anup Santamaria, PT 19 1435      Row Name 19 1400             Gait/Stairs Assessment/Training    Gait/Stairs Assessment/Training  gait/ambulation independence  -RT      [place] Level (Gait)  contact guard  -RT      Assistive Device (Gait)  walker, front-wheeled  -RT      Distance in Feet (Gait)  300  -RT      Recorded by [RT] Anup Santamaria, PT  07/12/19 1435      Row Name 07/12/19 1400             Positioning and Restraints    Pre-Treatment Position  in bed  -RT      Post Treatment Position  bed  -RT      In Bed  call light within reach;with family/caregiver  -RT      Recorded by [RT] Anup Santamaria, PT 07/12/19 1433        User Key  (r) = Recorded By, (t) = Taken By, (c) = Cosigned By    Initials Name Effective Dates Discipline    RT Anup Santamaria, PT 04/03/18 -  PT                   Physical Therapy Education     Title: PT OT SLP Therapies (Done)     Topic: Physical Therapy (Done)     Point: Mobility training (Done)     Learning Progress Summary           Patient Acceptance, E, DU by RT at 7/12/2019  2:35 PM    Acceptance, E, VU by BC at 7/11/2019  6:36 PM                   Point: Home exercise program (Done)     Learning Progress Summary           Patient Acceptance, E, DU by RT at 7/12/2019  2:35 PM    Acceptance, E, VU by BC at 7/11/2019  6:36 PM                   Point: Body mechanics (Done)     Learning Progress Summary           Patient Acceptance, E, DU by RT at 7/12/2019  2:35 PM    Acceptance, E, VU by BC at 7/11/2019  6:36 PM                   Point: Precautions (Done)     Learning Progress Summary           Patient Acceptance, E, DU by RT at 7/12/2019  2:35 PM    Acceptance, E, VU by BC at 7/11/2019  6:36 PM                               User Key     Initials Effective Dates Name Provider Type Discipline    BC 03/14/16 -  Mana Cason PT Physical Therapist PT    RT 04/03/18 -  Anup Santamaria, PT Physical Therapist PT                PT Recommendation and Plan     Plan of Care Reviewed With: (P) patient  Progress: (P) improving  Outcome Summary: (P) Pt increased gait dist to 300ft with rw cga.     Time Calculation:   PT Charges     Row Name 07/12/19 1436             Time Calculation    Start Time  -- 15  -RT        User Key  (r) = Recorded By, (t) = Taken By, (c) = Cosigned By    Initials Name Provider Type    RT Anup Santamaria,  PT Physical Therapist        Therapy Charges for Today     Code Description Service Date Service Provider Modifiers Qty    34312119234 HC GAIT TRAINING EA 15 MIN 2019 Anup Santamaria, PT GP 1               Anup Santamaria PT  2019         Electronically signed by Anup Santamaria PT at 2019  2:37 PM     Anup Santamaria PT at 2019  2:37 PM  Version 1 of 1         Problem: Patient Care Overview  Goal: Plan of Care Review  Outcome: Ongoing (interventions implemented as appropriate)   19 1435   Plan of Care Review   Progress improving   Coping/Psychosocial   Plan of Care Reviewed With patient   OTHER   Outcome Summary Pt increased gait dist to 300ft with rw cga.           Electronically signed by Anup Santamaria PT at 2019  2:37 PM       Occupational Therapy Notes (last 24 hours) (Notes from 2019  3:23 PM through 2019  3:23 PM)     No notes of this type exist for this encounter.             Discharge Summary      Ruthy Sharma MD at 2019  2:07 PM              [facility] DISCHARGE SUMMARY    Patient Identification:  Name:  Shruthi Walker  Age:  89 y.o.  Sex:  female  :  1929  MRN:  [id number]  Visit Number:  [id number]    Date of Admission: 7/10/2019  Date of Discharge:  2019     PCP: Bryant Cason MD    DISCHARGE DIAGNOSIS  *Metabolic encephalopathy due to underlying E. coli UTI  *E coli UTI  *Diabetes mellitus type 2      HOSPITAL COURSE  Patient is a 89 y.o. female presented to [facility] complaining of altered mentation.  Please see the admitting history and physical for further details.  The patient was found to have metabolic encephalopathy due to underlying UTI and her mental status has returned back to complete baseline after treating with antibiotics.  The patient did not require CVA work-up due to her recovery.  She was asked to follow-up with her primary physician within the next week.        VITAL  SIGNS:  Temp:  [97.3 °F (36.3 °C)-97.8 °F (36.6 °C)] 97.7 °F (36.5 °C)  Heart Rate:  [61-76] 76  Resp:  [18] 18  BP: (123-148)/(48-63) 123/61  SpO2:  [91 %-95 %] 93 %  on   ;   Device (Oxygen Therapy): room air    Body mass index is 25.93 kg/m².  Wt Readings from Last 3 Encounters:   07/12/19 62.3 kg (137 lb 4 oz)       PHYSICAL EXAM:  Constitutional:  Well-developed and well-nourished.  No respiratory distress.      HENT:  Head: Normocephalic and atraumatic.  Mouth:  Moist mucous membranes.    Eyes:  Conjunctivae and EOM are normal.  Pupils are equal, round, and reactive to light.  No scleral icterus.  Neck:  Neck supple.  No JVD present.    Cardiovascular:  Normal rate, regular rhythm and normal heart sounds with no murmur.  Pulmonary/Chest:  No respiratory distress, no wheezes, no crackles, with normal breath sounds and good air movement.  Abdominal:  Soft.  Bowel sounds are normal.  No distension and no tenderness.   Musculoskeletal:  No edema, no tenderness, and no deformity.  No red or swollen joints anywhere.    Neurological:  Alert and oriented to person, place, and time.  No cranial nerve deficit.  No tongue deviation.  No facial droop.  No slurred speech.   Skin:  Skin is warm and dry.  No rash noted.  No pallor.   Peripheral vascular:  No edema and strong pulses on all 4 extremities.      DISCHARGE DISPOSITION   Stable    DISCHARGE MEDICATIONS:     Discharge Medications      New Medications      Instructions Start Date   aspirin 81 MG EC tablet   81 mg, Oral, Daily      sulfamethoxazole-trimethoprim 800-160 MG per tablet  Commonly known as:  BACTRIM DS   1 tablet, Oral, 2 Times Daily         Changes to Medications      Instructions Start Date   metFORMIN 500 MG tablet  Commonly known as:  GLUCOPHAGE  What changed:  when to take this   500 mg, Oral, 2 Times Daily With Meals         Stop These Medications    glipiZIDE 2.5 MG 24 hr tablet  Commonly known as:  GLUCOTROL XL            Diet Instructions      Diet: Consistent Carbohydrate      Discharge Diet:  Consistent Carbohydrate        Activity Instructions     Activity as Tolerated          No future appointments.    Additional Instructions for the Follow-ups that You Need to Schedule     Discharge Follow-up with PCP   As directed       Currently Documented PCP:    Bryant Richardson MD    PCP Phone Number:    [phone]     Follow Up Details:  In 1 week         Referral to Home Health   As directed      Face to Face Visit Date:  7/12/2019    Follow-up Provider for Plan of Care?:  I treated the patient in an acute care facility and will not continue treatment after discharge.    Follow-up Provider:  BRYANT RICHARDSON [[id number]]    Reason/Clinical Findings:  Confusion    Describe mobility limitations that make leaving home difficult:  Elderly lady needs significant assistant    Nursing/Therapeutic Services Requested:  Skilled Nursing    Skilled nursing orders:  Medication education Mental health    Frequency:  1 Week 1           Follow-up Information     Bryant Richardson MD .    Specialty:  Family Medicine  Why:  In 1 week  Contact information:  [address] [facility] [id number] [phone]             [facility] [place] [facility] .    Specialty:  Home Health Services  Contact information:  [address] [place], [place] [id number] [phone]                  TEST  RESULTS PENDING AT DISCHARGE   Order Current Status    Blood Culture - Blood, Arm, Left Preliminary result    Blood Culture - Blood, Arm, Right Preliminary result           CODE STATUS  Code Status and Medical Interventions:   Ordered at: 07/10/19 7601     Level Of Support Discussed With:    Patient     Code Status:    CPR     Medical Interventions (Level of Support Prior to Arrest):    Full       Mhd Marychuy Sharma MD  07/12/19  2:07 PM    Please note that this discharge summary required more than 30 minutes to complete.    Please send a copy of this  dictation to the following providers:  Bryant Cason MD      Electronically signed by Ruthy Sharma MD at 7/12/2019  2:09 PM       Discharge Order (From admission, onward)    Start     Ordered    07/12/19 1405  Discharge patient  Once     Expected Discharge Date:  07/12/19    Discharge Disposition:  [place] [facility]    Physician of Record for Attribution - Please select from Treatment Team:  RUTHY SHARMA [[id number]]    Review needed by CMO to determine Physician of Record:  No       Question Answer Comment   Physician of Record for Attribution - Please select from Treatment Team RUTHY SHARMA    Review needed by CMO to determine Physician of Record No        07/12/19 1403

## 2019-10-15 NOTE — ED PROVIDER NOTES
Subjective   89-year-old white female presents secondary to fall with left groin and low back pain.  She states that she tripped over her house shoes approximately 2 weeks ago.  She is had persistent pain.  She was seen and evaluated by physical therapy who stated that they could not treat her until she came for further evaluation.  Patient has a known history of myoplastic disorder.  She denies head injury.  No headache.  No vomiting.  No neck pain.  No chest pain.  No abdominal pain.  She voices no other complaints at this time.            Review of Systems   Constitutional: Negative.  Negative for fever.   HENT: Negative.    Respiratory: Negative.    Cardiovascular: Negative.  Negative for chest pain.   Gastrointestinal: Negative.  Negative for abdominal pain.   Endocrine: Negative.    Genitourinary: Negative.  Negative for dysuria.   Skin: Negative.    Neurological: Negative.    Psychiatric/Behavioral: Negative.    All other systems reviewed and are negative.      Past Medical History:   Diagnosis Date   • Diabetes mellitus (CMS/HCC)    • MDS (myelodysplastic syndrome) (CMS/HCC)        No Known Allergies    Past Surgical History:   Procedure Laterality Date   • HYSTERECTOMY     • KNEE SURGERY         History reviewed. No pertinent family history.    Social History     Socioeconomic History   • Marital status:      Spouse name: Not on file   • Number of children: Not on file   • Years of education: Not on file   • Highest education level: Not on file   Tobacco Use   • Smoking status: Former Smoker   • Smokeless tobacco: Never Used   Substance and Sexual Activity   • Alcohol use: No     Frequency: Never   • Drug use: No   • Sexual activity: Defer           Objective   Physical Exam   Constitutional: She is oriented to person, place, and time. She appears well-developed and well-nourished. No distress.   HENT:   Head: Normocephalic and atraumatic.   Right Ear: External ear normal.   Left Ear: External ear  normal.   Nose: Nose normal.   Eyes: Conjunctivae and EOM are normal. Pupils are equal, round, and reactive to light.   Neck: Normal range of motion. Neck supple. No JVD present. No tracheal deviation present.   Cardiovascular: Normal rate, regular rhythm and normal heart sounds.   No murmur heard.  Pulmonary/Chest: Effort normal and breath sounds normal. No respiratory distress. She has no wheezes.   Abdominal: Soft. Bowel sounds are normal. There is no tenderness.   Musculoskeletal: Normal range of motion. She exhibits no edema or deformity.   Nonfocal lumbar tenderness and she does have some slight tenderness over her left groin.   Neurological: She is alert and oriented to person, place, and time. No cranial nerve deficit.   Skin: Skin is warm and dry. No rash noted. She is not diaphoretic. No erythema. No pallor.   Psychiatric: She has a normal mood and affect. Her behavior is normal. Thought content normal.   Nursing note and vitals reviewed.      Procedures           ED Course  ED Course as of Oct 15 1803   Tue Oct 15, 2019   1724 Patient requested to have her blood work drawn while she was here.  She was supposed to have it drawn before she sees her oncologist tomorrow.  According to patient's daughter.  Her platelets have been running around 70 and her white count around 2.  There were counseled about the importance of follow-up with hematology.  No fever.  No sign of infection.  Patient is currently taking tramadol and states this helps with pain.  [JI]      ED Course User Index  [JI] Manjit Brumfield PA                  MDM  Number of Diagnoses or Management Options  Closed fracture of left side of symphysis pubis with diastasis, initial encounter (CMS/HCC): new and requires workup  Compression fracture of L1 vertebra, initial encounter (CMS/HCC): new and requires workup     Amount and/or Complexity of Data Reviewed  Clinical lab tests: reviewed and ordered  Tests in the radiology section of CPT®:  reviewed and ordered  Decide to obtain previous medical records or to obtain history from someone other than the patient: yes  Discuss the patient with other providers: yes    Risk of Complications, Morbidity, and/or Mortality  Presenting problems: moderate        Final diagnoses:   Closed fracture of left side of symphysis pubis with diastasis, initial encounter (CMS/[facility])   Compression fracture of L1 vertebra, initial encounter (CMS/[facility])              Manjit Brumfield PA  10/15/19 1808

## 2019-11-11 NOTE — CODE DOCUMENTATION
Pt arrived to Infusion clinic for possible blood transfusion.  Chelo from lab que blood from pt and did type and cross match.  Results received and per parameters no blood or platelets were needed.  HgH 8.8, Hct 28.8 and Platelets 24.  Informed pt and family blood and  platelets results are above parameters and pt would not need transfuse.  Verbalized understanding.

## 2019-11-13 PROBLEM — C92.00 AML (ACUTE MYELOBLASTIC LEUKEMIA) (HCC): Status: ACTIVE | Noted: 2019-01-01

## 2019-11-13 NOTE — H&P
[facility]    Patient Identification:  Name:  Shruthi Walker  Age:  89 y.o.  Sex:  female  :  1929  MRN:  [id number]   Visit Number:  [id number]  Room number:  249/2S  Primary Care Physician:  Bryant Cason MD     Subjective     Chief complaint:  Initiated Chemotherapy    History of presenting illness:    Ms Walker is 89F PMH newly diagnosed AML in the setting of hx of MDS c/b severe pancytopenia and transfusion dependent anemia, follows w/ Dr. Kelly as outpatient, began treatment today with Dacogen and is being admitted for observation and prophylaxis for TLS w/ fluids and frequent lab checks.  Patient received ppx Rasburicase prior to receiving chemotherapy today.  Upon arrival to the floor patient is doing quite well, she has no new complaints today and is asking about eating food.  She denies any current fevers or chills, no significant worsening of fatigue or other complaints.  She has several daughters present in the room with her who aren't quite sure what she is being admitted for at this time but I explained to them the nature of AML and risk for TLS when initiating chemotherapy.  Per conversation w/ Dr. Kelly today plan is to monitor labs and he will be updated with text messages from nursing, additionally she will be discharged tomorrow to continue her chemotherapy.    ---------------------------------------------------------------------------------------------------------------------   Review of Systems   Constitutional: Positive for fatigue.   HENT: Negative.    Eyes: Negative.    Respiratory: Negative.    Cardiovascular: Negative.    Gastrointestinal: Negative.    Endocrine: Negative.    Genitourinary: Negative.    Musculoskeletal: Negative.    Skin: Negative.    Allergic/Immunologic: Negative.    Neurological: Negative.    Hematological: Negative.    Psychiatric/Behavioral: Negative.       ---------------------------------------------------------------------------------------------------------------------   Past Medical History:   Diagnosis Date   • Arthritis    • Diabetes mellitus (CMS/HCC)    • History of transfusion    • MDS (myelodysplastic syndrome) (CMS/HCC)      Past Surgical History:   Procedure Laterality Date   • BREAST SURGERY      cysts   • HYSTERECTOMY     • KNEE SURGERY       Reviewed Family History and negative related conditions    Social History     Socioeconomic History   • Marital status:      Spouse name: Not on file   • Number of children: Not on file   • Years of education: Not on file   • Highest education level: Not on file   Tobacco Use   • Smoking status: Former Smoker   • Smokeless tobacco: Never Used   Substance and Sexual Activity   • Alcohol use: No     Frequency: Never   • Drug use: No   • Sexual activity: Defer     ---------------------------------------------------------------------------------------------------------------------   Allergies:  Patient has no known allergies.  ---------------------------------------------------------------------------------------------------------------------   Medications below are reported home medications pulling from within the system; at this time, these medications have not been reconciled unless otherwise specified and are in the verification process for further verifcation as current home medications.    Prior to Admission Medications     Prescriptions Last Dose Informant Patient Reported? Taking?    acyclovir (ZOVIRAX) 400 MG tablet 11/13/2019 Child Yes Yes    Take 400 mg by mouth 2 (Two) Times a Day.    allopurinol (ZYLOPRIM) 100 MG tablet 11/13/2019 Child Yes Yes    Take 100 mg by mouth 2 (Two) Times a Day.    cyanocobalamin 1000 MCG/ML injection 10/15/2019 Child Yes Yes    Inject 1,000 mcg into the appropriate muscle as directed by prescriber Every 30 (Thirty) Days.    levoFLOXacin (LEVAQUIN) 500 MG tablet  11/13/2019 Child Yes Yes    Take 500 mg by mouth Daily.    metFORMIN (GLUCOPHAGE) 500 MG tablet 11/13/2019 Child Yes Yes    Take 500 mg by mouth 3 (Three) Times a Day.        Objective     Vital Signs:  Temp:  [97.8 °F (36.6 °C)] 97.8 °F (36.6 °C)  Heart Rate:  [82] 82  Resp:  [18] 18  BP: (145)/(60) 145/60    Mean Arterial Pressure (Non-Invasive) for the past 24 hrs (Last 3 readings):   Noninvasive MAP (mmHg)   11/13/19 1700 87     SpO2:  [98 %] 98 %  on   ;   Device (Oxygen Therapy): room air  There is no height or weight on file to calculate BMI.    Wt Readings from Last 3 Encounters:   10/15/19 58.1 kg (128 lb)   07/12/19 62.3 kg (137 lb 4 oz)      ---------------------------------------------------------------------------------------------------------------------   Physical Exam:  Constitutional:  Elderly, Well-developed and well-nourished.  No acute distress.      HENT:  Head: Normocephalic and atraumatic.  Mouth:  Moist mucous membranes.    Eyes:  Conjunctivae and EOM are normal.  No scleral icterus.  Neck:  Neck supple.  No JVD present.    Cardiovascular:  Normal rate, regular rhythm and normal heart sounds with no murmur.  Pulmonary/Chest:  No respiratory distress, no wheezes, no crackles, with normal breath sounds and good air movement.  Abdominal:  Soft.  Bowel sounds are normal.  No distension and no tenderness.   Musculoskeletal:  No edema, no tenderness, and no deformity.  No red or swollen joints anywhere.    Neurological:  Alert and oriented to person, place, and time.  No cranial nerve deficit.  No tongue deviation.  No facial droop.  No slurred speech.   Skin:  Skin is warm and dry.  No rash noted.  No pallor.   Peripheral vascular:  No edema and pulses on all 4 extremities.  ---------------------------------------------------------------------------------------------------------------------  EKG:    No orders to display   Have ordered baseline EKG    Telemetry:   Have ordered Cardiac  monitoring    Last echocardiogram:  N/A  --------------------------------------------------------------------------------------------------------------------  Labs:  Results from last 7 days   Lab Units 11/11/19  1224   WBC 10*3/mm3 1.39*   HEMOGLOBIN g/dL 8.8*   HEMATOCRIT % 28.8*   MCV fL 106.7*   MCHC g/dL 30.6*   PLATELETS 10*3/mm3 24*         Results from last 7 days   Lab Units 11/11/19  1224   SODIUM mmol/L 140   POTASSIUM mmol/L 3.9   CHLORIDE mmol/L 100   CO2 mmol/L 27.0   BUN mg/dL 12   CREATININE mg/dL 0.66   EGFR IF NONAFRICN AM mL/min/1.73 84   CALCIUM mg/dL 8.8   GLUCOSE mg/dL 245*   ALBUMIN g/dL 3.81   BILIRUBIN mg/dL 0.7   ALK PHOS U/L 83   AST (SGOT) U/L 15   ALT (SGPT) U/L 9   Estimated Creatinine Clearance: 37.3 mL/min (by C-G formula based on SCr of 0.66 mg/dL).    No results found for: AMMONIA          No results found for: HGBA1C, POCGLU  Lab Results   Component Value Date    TSH 1.560 07/10/2019     No results found for: PREGTESTUR, PREGSERUM, HCG, HCGQUANT  Pain Management Panel     There is no flowsheet data to display.        Brief Urine Lab Results  (Last result in the past 365 days)      Color   Clarity   Blood   Leuk Est   Nitrite   Protein   CREAT   Urine HCG        11/06/19 1128 Yellow Clear Negative Negative Negative Trace             I have personally looked at the labs and they are summarized above.  ----------------------------------------------------------------------------------------------------------------------  Detailed radiology reports for the last 24 hours:    Imaging Results (Last 24 Hours)     ** No results found for the last 24 hours. **        Final impressions for the last 30 days of radiology reports:    Ct Lumbar Spine Without Contrast    Result Date: 10/15/2019  Mild compression fracture of the superior endplate of L1 but no significant retropulsion  This report was finalized on 10/15/2019 4:41 PM by Dr. Velasquez Swift MD.      Ct Pelvis Without Contrast    Result  Date: 10/15/2019  Appearance concerning for fracture of the pubic symphysis on the left without significant displacement.  This report was finalized on 10/15/2019 4:43 PM by Dr. Velasquez Swift MD.      I have personally looked at the radiology images and read the final radiology report.    Assessment & Plan    89F PMH newly diagnosed AML in the setting of hx of MDS c/b severe pancytopenia and transfusion dependent anemia, follows w/ Dr. Kelly as outpatient, began treatment today with Dacogen and is being admitted for observation and prophylaxis for TLS     #AML  #Hx MDS  #PPx for TLS  #Severe pancytopenia, transfusion dependent anemia  - Admit for observation, initiate reverse isolation precautions  - Trend CMP, Mg, Phos, Uric Acid.  Placed on electrolyte replacement protocol.  Labs to be texted to Dr. Kelly.  - Started on NS 75cc/hr, monitor for VOL  - Continue Allopurinol, Levaquin, Acyclovir as per Dr. Kelly recommendations  - Transfuse Hgb < 7 or symptomatic, transfuse plts < 10K    #NIDDM Type II  - Hold home Metformin, DM diet, treat FSBG and SSI while inpatient.    F: Oral  E: Monitor & Replace PRN  N: DM diet  PPx: SCDs  Code: Full Code    Dispo: Admit for observation overnight, discharge tomorrow to resume chemotherapy    VTE Prophylaxis:   Mechanical Order History:     Ordered        11/13/19 1836  Place Sequential Compression Device  Once         11/13/19 1836  Maintain Sequential Compression Device  Continuous           Pharmalogical Order History:     None        The patient is high risk due to the following diagnoses/reasons:  AML, requiring frequent monitoring for TLS, severe pancytopenia    Woodrow Stern MD  [facility] Hospitalist  11/13/19  6:38 PM

## 2019-11-14 NOTE — DISCHARGE SUMMARY
[facility] Medicine Services  DISCHARGE SUMMARY    Date of Admission: 11/13/2019    Date of Discharge:  11/14/2019    PCP: Bryant Cason MD    Discharging Provider: Shruthi Proctor PA-C  Attending Physician on day of DC: Dr. Woodrow Vasquez    Admission Diagnosis:   Please see admission H&P    Discharge Diagnosis:   · AML  · History of myelodysplastic syndrome  · Severe pancytopenia with transfusion dependent anemia  · Non-insulin-dependent diabetes mellitus type 2  · Advanced age  · Hypomagnesemia      HPI     History of Present Illness:  Shruthi Walker is a 89 y.o. female this past medical history significant for non-insulin-dependent diabetes mellitus, myelodysplastic syndrome, arthritis and AML.  She was admitted to the observation unit as a direct admit from the office of Dr. Kelly for monitoring of labs closely and prophylaxis for tumor lysis syndrome with fluids.       Hospital Course     Hospital Course  Shruthi Walker was admitted as outlined in above HPI.  She did receive IV fluids.  In addition, treatment with allopurinol, Levaquin, and acyclovir was continued per recommendations by Dr. Kelly.   She did not require transfusion during observation.  She did have some hypomagnesemia for which she received electrolyte replacement per protocol.  Metformin was held while hospitalized with sliding scale insulin utilized.  Given her advanced age, would ultimately consider discontinuing metformin from her medication regimen; however, at present, will leave to the discretion of her primary care provider.    On the morning of November 14, 2019, she is feeling well reported she felt significantly better than when she arrived yesterday following chemotherapy.  She is being discharged this morning in order to make her noon chemotherapy appointment.    Discussed with MIRTA Villa on day of discharge.     Will leave further labs to the discretion of Dr. Kelly.  Lab values remained stable  while observed and can be reviewed in pertinent laboratory and radiology results below.       Pertinent Laboratory and Radiology Results     Pertinent Test Results:          Results from last 7 days   Lab Units 11/14/19 0446 11/14/19 0049 11/13/19 1933 11/11/19  1224   WBC 10*3/mm3  --  1.78* 1.91* 1.39*   HEMOGLOBIN g/dL  --  7.6* 7.6* 8.8*   HEMATOCRIT %  --  25.3* 25.9* 28.8*   PLATELETS 10*3/mm3  --  22* 22* 24*   MCV fL  --  109.1* 113.1* 106.7*   SODIUM mmol/L 137 140 141 140   POTASSIUM mmol/L 3.7 3.9 4.4 3.9   CHLORIDE mmol/L 101 104 105 100   CO2 mmol/L 23.2 23.2 23.8 27.0   BUN mg/dL 9 11 12 12   CREATININE mg/dL 0.58 0.67 0.74 0.66   GLUCOSE mg/dL 164* 119* 193* 245*   CALCIUM mg/dL 8.5 8.6 8.4 8.8          Results from last 7 days   Lab Units 11/14/19 0049 11/13/19 1933 11/11/19  1224   WBC 10*3/mm3 1.78* 1.91* 1.39*     Results from last 7 days   Lab Units 11/14/19 0446 11/14/19 0049 11/13/19 1933 11/11/19  1224   BILIRUBIN mg/dL 0.5 0.4 0.4 0.7   ALK PHOS U/L 69 72 75 83   ALT (SGPT) U/L 7 8 9 9   AST (SGOT) U/L 13 15 15 15           Invalid input(s): TG, LDLCALC, LDLREALC      Brief Urine Lab Results  (Last result in the past 365 days)      Color   Clarity   Blood   Leuk Est   Nitrite   Protein   CREAT   Urine HCG        11/06/19 1128 Yellow Clear Negative Negative Negative Trace                                   ----------------------------------------------------------------------------------------------------------------------  Ct Lumbar Spine Without Contrast    Result Date: 10/15/2019  Mild compression fracture of the superior endplate of L1 but no significant retropulsion  This report was finalized on 10/15/2019 4:41 PM by Dr. Velasquez Swift MD.      Ct Pelvis Without Contrast    Result Date: 10/15/2019  Appearance concerning for fracture of the pubic symphysis on the left without significant displacement.  This report was finalized on 10/15/2019 4:43 PM by Dr. Velasquez Swift MD.           Microbiology Results (last 10 days)     ** No results found for the last 240 hours. **          Labs above have been reviewed on the day of discharge.  Radiology images from prior 30 days were reviewed prior to discharge as incorporated into this document.     Discharge Vitals and Physical Examination       Vital Signs  Temp:  [97.4 °F (36.3 °C)-98.4 °F (36.9 °C)] 97.4 °F (36.3 °C)  Heart Rate:  [73-82] 73  Resp:  [16-18] 16  BP: (130-145)/(53-77) 132/70     PHYSICAL EXAMINATION:   Physical Exam   Constitutional: She is oriented to person, place, and time and well-developed, well-nourished, and in no distress.   HENT:   Head: Normocephalic and atraumatic.   Eyes: EOM are normal. Pupils are equal, round, and reactive to light.   Neck: Normal range of motion. Neck supple.   Cardiovascular: Normal rate and regular rhythm.   Pulmonary/Chest: Effort normal and breath sounds normal.   Abdominal: Soft. Bowel sounds are normal.   Musculoskeletal: Normal range of motion. She exhibits no edema or deformity.   Neurological: She is alert and oriented to person, place, and time.   Skin: Skin is warm and dry.   Psychiatric: Affect and judgment normal.         Discharge Disposition, Discharge Medications, and Discharge Appointments     Discharge Disposition:   home    Condition on Discharge:  Fair    Discharge Medications:     Discharge Medications      Continue These Medications      Instructions Start Date   acyclovir 400 MG tablet  Commonly known as:  ZOVIRAX   400 mg, Oral, 2 Times Daily      allopurinol 100 MG tablet  Commonly known as:  ZYLOPRIM   100 mg, Oral, 2 Times Daily      cyanocobalamin 1000 MCG/ML injection   1,000 mcg, Intramuscular, Every 30 Days      levoFLOXacin 500 MG tablet  Commonly known as:  LEVAQUIN   500 mg, Oral, Daily      metFORMIN 500 MG tablet  Commonly known as:  GLUCOPHAGE   500 mg, Oral, 3 Times Daily             Discharged medication regimen discussed with attending physician prior to  discharge.     Discharge Diet:   diabetic diet  Dietary Orders (From admission, onward)    Start     Ordered    11/13/19 1811  Diet Regular; Consistent Carbohydrate  Diet Effective Now     Question Answer Comment   Diet Texture / Consistency Regular    Common Modifiers Consistent Carbohydrate        11/13/19 1810          Activity at Discharge:  activity as tolerated         Discharge Disposition:    Home or Self Care        Follow-up Appointments:      Additional Instructions for the Follow-ups that You Need to Schedule     Discharge Follow-up with Specified Provider: Keep Dr. Kelly appointment this afternoon for chemotherapy; Today   As directed      To:  Keep Dr. Kelly appointment this afternoon for chemotherapy    Follow Up:  Today           Follow-up Information     Bryant Cason MD .    Specialty:  Family Medicine  Contact information:  [address] [facility] [address] [phone]                   Additional Instructions for the Follow-ups that You Need to Schedule     Discharge Follow-up with Specified Provider: Keep Dr. Kelly appointment this afternoon for chemotherapy; Today   As directed      To:  Keep Dr. Kelly appointment this afternoon for chemotherapy    Follow Up:  Today               Test Results Pending at Discharge:           Shruthi Proctor PA-C  [facility] Medicine Team  11/14/19  8:03 AM      Time: Greater than 30 minutes spent on this discharge.

## 2019-11-14 NOTE — PROGRESS NOTES
Discharge Planning Assessment  [facility]     Patient Name: Shruthi Walker  MRN: [id number]  Today's Date: 11/14/2019    Admit Date: 11/13/2019    Discharge Needs Assessment     Row Name 11/14/19 1035       Living Environment    Lives With  spouse;child(carlos manuel), adult    Name(s) of Who Lives With Patient  Ss lives alone, but states that her daughters are very involved.     Current Living Arrangements  home/apartment/condo    Primary Care Provided by  child(carlos manuel);other (see comments) [employer]     Provides Primary Care For  no one, unable/limited ability to care for self    Family Caregiver if Needed  child(carlos manuel), adult    Quality of Family Relationships  helpful;involved;supportive    Able to Return to Prior Arrangements  yes       Resource/Environmental Concerns    Transportation Concerns  car, none       Transition Planning    Patient/Family Anticipates Transition to  home with family;home with help/services    Transportation Anticipated  family or friend will provide       Discharge Needs Assessment    Equipment Currently Used at Home  bipap/cpap;walker, rolling;wheelchair;hospital bed;oxygen    Equipment Needed After Discharge  none        Discharge Plan     Row Name 11/14/19 1045       Plan    Plan  Ss spoke with pt on this day. Pt lives at home alone, but hse has 6 daughters who assist her. Two of her daughter live in [place]. Pt receives [employer]  services. Pt does not use any DME at home. Pt does not have a POA or a living will. Pt uses [facility] pharmacy, and her PCP is Bryant Cason. Pt plans to return home at discharge, with transportation provided by her daughter.     Patient/Family in Agreement with Plan  yes    Final Discharge Disposition Code  06 - home with home health care    Final Note  Pt is being discharged on this day. Pt has [employer]. SS sent pt's updated information to [employer]. Breonna from [employer] stated that she would call  if report was needed from nurse. No  other needs identified.              Expected Discharge Date and Time     Expected Discharge Date Expected Discharge Time    Nov 14, 2019         Demographic Summary     Row Name 11/14/19 1035       General Information    Admission Type  observation    Referral Source  nursing    Reason for Consult  discharge planning    Preferred Language  English     Used During This Interaction  PRATIK Akhtar

## 2019-11-14 NOTE — PLAN OF CARE
Problem: Patient Care Overview  Goal: Plan of Care Review  Outcome: Ongoing (interventions implemented as appropriate)    Goal: Individualization and Mutuality  Outcome: Ongoing (interventions implemented as appropriate)    Goal: Discharge Needs Assessment  Outcome: Ongoing (interventions implemented as appropriate)    Goal: Interprofessional Rounds/Family Conf  Outcome: Ongoing (interventions implemented as appropriate)      Problem: Fall Risk (Adult)  Goal: Identify Related Risk Factors and Signs and Symptoms  Outcome: Ongoing (interventions implemented as appropriate)    Goal: Absence of Fall  Outcome: Ongoing (interventions implemented as appropriate)      Problem: Skin Injury Risk (Adult)  Goal: Identify Related Risk Factors and Signs and Symptoms  Outcome: Ongoing (interventions implemented as appropriate)    Goal: Skin Health and Integrity  Outcome: Ongoing (interventions implemented as appropriate)      Problem: Neutropenia (Adult)  Goal: Signs and Symptoms of Listed Potential Problems Will be Absent, Minimized or Managed (Neutropenia)  Outcome: Ongoing (interventions implemented as appropriate)

## 2019-11-14 NOTE — DISCHARGE PLACEMENT REQUEST
"Bradford Hwang (89 y.o. Female)     Date of Birth Social Security Number Address Home Phone MRN    07/05/1929  [address]   [place] [address] [phone] [id number]    Confucianist Marital Status          Pentecostal        Admission Date Admission Type Admitting Provider Attending Provider Department, Room/Bed    11/13/19 Urgent Woodrow Stern MD Parks, Andrew, MD [facility] OBSERVATION UNIT, 249/2S    Discharge Date Discharge Disposition Discharge Destination         Home or Self Care              Attending Provider:  Woodrow Stern MD    Allergies:  No Known Allergies    Isolation:  None   Infection:  None   Code Status:  Prior    Ht:  152.4 cm (60\")   Wt:  58.1 kg (128 lb)    Admission Cmt:  None   Principal Problem:  None                Active Insurance as of 11/13/2019     Primary Coverage     Payor Plan Insurance Group Employer/Plan Group    MEDICARE MEDICARE A & B      Payor Plan Address Payor Plan Phone Number Payor Plan Fax Number Effective Dates    [address] [phone]  7/1/1994 - None Entered    [facility] [id number]       Subscriber Name Subscriber Birth Date Member ID       BRADFORD HWANG 7/5/1929 [id number]           Secondary Coverage     Payor Plan Insurance Group Employer/Plan Group    ANTHEM MEDICAID TENNCARE BLUE CROSS [id number]     Payor Plan Address Payor Plan Phone Number Payor Plan Fax Number Effective Dates    [address] [phone]  12/1/2016 - None Entered    [facility] [id number]       Subscriber Name Subscriber Birth Date Member ID       BRADFORD HWANG 7/5/1929 [id number]                 Emergency Contacts      (Rel.) Home Phone Work Phone Mobile Phone    Lamar Smith \"Brigida\" (Daughter) [phone] -- [phone]    MISSY ATKINSON (Daughter) [phone] -- --          Bradford Hwang MRN: [id number]      11/13/2019 - 11/14/2019     [facility] OBSERVATION UNIT    After Visit Summary   Instructions     ·   No changes were made to your " medications.     If you have any questions about your recovery, please call the [facility] Nurse Call Center at [phone]. A registered nurse is available 24 hours a day 7 days a week to assist you.   ·   Activity instructions     Activity as tolerated   ·   Diet instructions     Diet as tolerated   ·   Other instructions     Discharge Follow-up with Specified Provider: Keep Dr. Kelly appointment this afternoon for chemotherapy; Today   What's next     What's next          Follow up with Bryant Cason MD  [address] [place] [id number]   [phone]    ·   Additional Information     Appointment today for chemotherapy treatment   Your Allergies   Date Reviewed: 2019   Your Allergies   No active allergies   Patient Belongings Returned     Document Return of Belongings Flowsheet     Were the patient bedside belongings sent home? Yes   Medications Retrieved from Pharmacy & Sent Home N/A   Belongings Sent to Safe --   Belongings sent with: --   Belongings Retrieved from Security & Sent Home N/A       Trifacta Signup     [facility] Trifacta allows you to send messages to your doctor, view your test results, renew your prescriptions, schedule appointments, and more. To sign up, go to [email or web address] and click on the Sign Up Now link in the New User? box. Enter your Tr[employer] Activation Code exactly as it appears below along with the last four digits of your Social Security Number and your Date of Birth () to complete the sign-up process. If you do not sign up before the expiration date, you must request a new code.     Trifacta Activation Code: [email or web address]  Expires: 2019  4:24 PM     If you have questions, you can email [email or web address] or call [phone] to talk to our Trifacta staff. Remember, Trifacta is NOT to be used for urgent needs. For medical emergencies, dial 911.     Medication List   Medication List    Morning Afternoon Evening Bedtime As  "Needed   acyclovir 400 MG tablet   Commonly known as: ZOVIRAX   Take 400 mg by mouth 2 (Two) Times a Day.   Last time this was given: 400 mg on 11/14/2019  7:55 AM         allopurinol 100 MG tablet   Commonly known as: ZYLOPRIM   Take 100 mg by mouth 2 (Two) Times a Day.   Last time this was given: 100 mg on 11/14/2019  7:56 AM         cyanocobalamin 1000 MCG/ML injection   Inject 1,000 mcg into the appropriate muscle as directed by prescriber Every 30 (Thirty) Days.         levoFLOXacin 500 MG tablet   Commonly known as: LEVAQUIN   Take 500 mg by mouth Daily.   Last time this was given: 500 mg on 11/14/2019  7:55 AM         metFORMIN 500 MG tablet   Commonly known as: GLUCOPHAGE   Take 500 mg by mouth 3 (Three) Times a Day.         Always carry an updated list of your medications with you. If there is an emergency, a responder can quickly see what medications you are taking. Take this paperwork with you the next time you see your health care provider.        LivePerson Sign-Up     Send messages to your doctor, view your test results, renew your prescriptions, schedule appointments, and more.   Go to [email or web address], click \"Sign Up Now\", and enter your personal activation code: [email or web address]. Activation code expires 11/14/2019.   Pneumonia Vaccination     Please follow up with your primary care provider or retail pharmacy to see if you are eligible for a pneumonia vaccination.        Opioid Resource     If you or someone you know needs information on substance abuse, please visit   https://www.findhelpnowky.org/ for listings of facilities and resources across [place].  Stroke Symptoms     · Call 911 or have someone take you to the Emergency Department if you have any of the following:  · Sudden numbness or weakness of your face, arm or leg especially on one side of the body  · Sudden confusion, difficulty speaking or trouble understanding   · Changes in your vision or loss of sight " in one eye  · Sudden severe headache with no known cause  · Sudden dizziness, trouble walking, loss of balance or coordination     It is important to seek emergency care right away if you have further stroke symptoms. If you get emergency help quickly, the powerful clot-dissolving medicines can reduce the disabilities caused by a stroke.      For more information:  American Stroke Association  2-771-2-STROKE  www.strokeassociation.org  IF YOU SMOKE OR USE TOBACCO PLEASE READ THE FOLLOWING:  Why is smoking bad for me?  Smoking increases the risk of heart disease, lung disease, vascular disease, stroke, and cancer. If you smoke, STOP!     For more information:  Quit Now [place]  1-800-QUIT-NOW  https://[facility].quitlogix.org/en-US/     Suicidal Feelings     If you feel like life is too tough and are thinking of suicide or injuring yourself, get help right away!  · Call 911  · Call a suicide hotline to speak to a counselor. 3-560-886-TALK or 0-876-HZDTQDR   Patient Experience     Thank you for choosing [facility]. You may receive a survey following your visit. Please take a moment to share what went well, where we need improvement, and which staff members deserve recognition. We value your input.           YOU ARE THE MOST IMPORTANT FACTOR IN YOUR RECOVERY.      Follow all instructions carefully.      I have reviewed my discharge instructions with my nurse, including the following information, if applicable:                 Information about my illness and diagnosis              Follow up appointments (including lab draws)              Wound Care              Equipment Needs              Medications (new and continuing) along with side effects              Preventative information such as vaccines and smoking cessations              Diet              Pain              I know when to contact my Doctor's office or seek emergency care        I want my nurse to describe the side effects of my medications: YES NO   If  "the answer is no, I understand the side effects of my medications: YES NO   My nurse described the side effects of my medications in a way that I could understand: YES NO   I have taken my personal belongings and my own medications with me at discharge: YES NO       I have received this information and my questions have been answered. I have discussed any concerns I see with this plan with the nurse or physician. I understand these instructions.     Signature of Patient or Responsible Person: _____________________________________     Date: _________________  Time: __________________     Signature of Healthcare Provider: _______________________________________  Date: _________________  Time: __________________            Emergency Contact Information     Name Relation Home Work Mobile    Lamar Smith \"Brigida\" Daughter [phone]    MISSY ATKINSON Daughter [phone]            Insurance Information                MEDICARE/MEDICARE A & B Phone: [phone]    Subscriber: Shruthi Walker Subscriber#: [id number]    Group#:  Precert#:         ANTHEM MEDICAID/[facility] Phone: [phone]    Subscriber: Shruthi Walker Subscriber#: [id number]    Group#: [id number] Precert#:           Treatment Team     Provider Relationship Specialty Contact    Woodrow Stern MD Attending, Physician of Record Internal Medicine [phone]    Daisy Calixto, HANNAH Registered Nurse --     Shruthi Proctor PA-C Physician Assistant Physician Assistant [phone]    Shruthi Hebert, RN Utilization Manager --           Problem List           Codes Noted - [facility]       Hospital    AML (acute myeloblastic leukemia) (CMS/[facility]) ICD-10-CM: C92.00  ICD-9-CM: 205.00 11/13/2019 - Present       Non-Hospital    UTI (urinary tract infection) ICD-10-CM: N39.0  ICD-9-CM: 599.0 7/10/2019 - Present             History & Physical      Woodrow Stern MD at 11/13/19 [facility]8              [facility] HOSPITALIST HISTORY AND " PHYSICAL    Patient Identification:  Name:  Shruthi Walker  Age:  89 y.o.  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 Kelly as outpatient, began treatment today with Dacogen and is being admitted for observation and prophylaxis for TLS w/ fluids and frequent lab checks.  Patient received ppx Rasburicase prior to receiving chemotherapy today.  Upon arrival to the floor patient is doing quite well, she has no new complaints today and is asking about eating food.  She denies any current fevers or chills, no significant worsening of fatigue or other complaints.  She has several daughters present in the room with her who aren't quite sure what she is being admitted for at this time but I explained to them the nature of AML and risk for TLS when initiating chemotherapy.  Per conversation w/ Dr. Kelly today plan is to monitor labs and he will be updated with text messages from nursing, additionally she will be discharged tomorrow to continue her chemotherapy.    ---------------------------------------------------------------------------------------------------------------------   Review of Systems   Constitutional: Positive for fatigue.   HENT: Negative.    Eyes: Negative.    Respiratory: Negative.    Cardiovascular: Negative.    Gastrointestinal: Negative.    Endocrine: Negative.    Genitourinary: Negative.    Musculoskeletal: Negative.    Skin: Negative.    Allergic/Immunologic: Negative.    Neurological: Negative.    Hematological: Negative.    Psychiatric/Behavioral: Negative.       ---------------------------------------------------------------------------------------------------------------------   Past Medical History:   Diagnosis Date   • Arthritis    • Diabetes mellitus (CMS/HCC)    • History of transfusion    • MDS (myelodysplastic syndrome) (CMS/HCC)      Past Surgical History:   Procedure Laterality Date   • BREAST SURGERY      cysts   • HYSTERECTOMY     • KNEE SURGERY       Reviewed Family History and negative related conditions    Social History     Socioeconomic History   • Marital status:      Spouse name: Not on file   • Number of children: Not on file   • Years of education: Not on file   • Highest education level: Not on file   Tobacco Use   • Smoking status: Former Smoker   • Smokeless tobacco: Never Used   Substance and Sexual Activity   • Alcohol use: No     Frequency: Never   • Drug use: No   • Sexual activity: Defer     ---------------------------------------------------------------------------------------------------------------------   Allergies:  Patient has no known allergies.  ---------------------------------------------------------------------------------------------------------------------   Medications below are reported home medications pulling from within the system; at this time, these medications have not been reconciled unless otherwise specified and are in the verification process for further verifcation as current home medications.    Prior to Admission Medications     Prescriptions Last Dose Informant Patient Reported? Taking?    acyclovir (ZOVIRAX) 400 MG tablet 11/13/2019 Child Yes Yes    Take 400 mg by mouth 2 (Two) Times a Day.    allopurinol (ZYLOPRIM) 100 MG tablet 11/13/2019 Child Yes Yes    Take 100 mg by mouth 2 (Two) Times a Day.    cyanocobalamin 1000 MCG/ML injection 10/15/2019 Child Yes Yes    Inject 1,000 mcg into the appropriate muscle as directed by prescriber Every 30 (Thirty) Days.    levoFLOXacin (LEVAQUIN) 500 MG tablet  11/13/2019 Child Yes Yes    Take 500 mg by mouth Daily.    metFORMIN (GLUCOPHAGE) 500 MG tablet 11/13/2019 Child Yes Yes    Take 500 mg by mouth 3 (Three) Times a Day.        Objective     Vital Signs:  Temp:  [97.8 °F (36.6 °C)] 97.8 °F (36.6 °C)  Heart Rate:  [82] 82  Resp:  [18] 18  BP: (145)/(60) 145/60    Mean Arterial Pressure (Non-Invasive) for the past 24 hrs (Last 3 readings):   Noninvasive MAP (mmHg)   11/13/19 1700 87     SpO2:  [98 %] 98 %  on   ;   Device (Oxygen Therapy): room air  There is no height or weight on file to calculate BMI.    Wt Readings from Last 3 Encounters:   10/15/19 58.1 kg (128 lb)   07/12/19 62.3 kg (137 lb 4 oz)      ---------------------------------------------------------------------------------------------------------------------   Physical Exam:  Constitutional:  Elderly, Well-developed and well-nourished.  No acute distress.      HENT:  Head: Normocephalic and atraumatic.  Mouth:  Moist mucous membranes.    Eyes:  Conjunctivae and EOM are normal.  No scleral icterus.  Neck:  Neck supple.  No JVD present.    Cardiovascular:  Normal rate, regular rhythm and normal heart sounds with no murmur.  Pulmonary/Chest:  No respiratory distress, no wheezes, no crackles, with normal breath sounds and good air movement.  Abdominal:  Soft.  Bowel sounds are normal.  No distension and no tenderness.   Musculoskeletal:  No edema, no tenderness, and no deformity.  No red or swollen joints anywhere.    Neurological:  Alert and oriented to person, place, and time.  No cranial nerve deficit.  No tongue deviation.  No facial droop.  No slurred speech.   Skin:  Skin is warm and dry.  No rash noted.  No pallor.   Peripheral vascular:  No edema and pulses on all 4 extremities.  ---------------------------------------------------------------------------------------------------------------------  EKG:    No orders to display   Have ordered baseline EKG    Telemetry:   Have ordered Cardiac  monitoring    Last echocardiogram:  N/A  --------------------------------------------------------------------------------------------------------------------  Labs:  Results from last 7 days   Lab Units 11/11/19  1224   WBC 10*3/mm3 1.39*   HEMOGLOBIN g/dL 8.8*   HEMATOCRIT % 28.8*   MCV fL 106.7*   MCHC g/dL 30.6*   PLATELETS 10*3/mm3 24*         Results from last 7 days   Lab Units 11/11/19  1224   SODIUM mmol/L 140   POTASSIUM mmol/L 3.9   CHLORIDE mmol/L 100   CO2 mmol/L 27.0   BUN mg/dL 12   CREATININE mg/dL 0.66   EGFR IF NONAFRICN AM mL/min/1.73 84   CALCIUM mg/dL 8.8   GLUCOSE mg/dL 245*   ALBUMIN g/dL 3.81   BILIRUBIN mg/dL 0.7   ALK PHOS U/L 83   AST (SGOT) U/L 15   ALT (SGPT) U/L 9   Estimated Creatinine Clearance: 37.3 mL/min (by C-G formula based on SCr of 0.66 mg/dL).    No results found for: AMMONIA          No results found for: HGBA1C, POCGLU  Lab Results   Component Value Date    TSH 1.560 07/10/2019     No results found for: PREGTESTUR, PREGSERUM, HCG, HCGQUANT  Pain Management Panel     There is no flowsheet data to display.        Brief Urine Lab Results  (Last result in the past 365 days)      Color   Clarity   Blood   Leuk Est   Nitrite   Protein   CREAT   Urine HCG        11/06/19 1128 Yellow Clear Negative Negative Negative Trace             I have personally looked at the labs and they are summarized above.  ----------------------------------------------------------------------------------------------------------------------  Detailed radiology reports for the last 24 hours:    Imaging Results (Last 24 Hours)     ** No results found for the last 24 hours. **        Final impressions for the last 30 days of radiology reports:    Ct Lumbar Spine Without Contrast    Result Date: 10/15/2019  Mild compression fracture of the superior endplate of L1 but no significant retropulsion  This report was finalized on 10/15/2019 4:41 PM by Dr. Velasquez Swift MD.      Ct Pelvis Without Contrast    Result  Date: 10/15/2019  Appearance concerning for fracture of the pubic symphysis on the left without significant displacement.  This report was finalized on 10/15/2019 4:43 PM by Dr. Velasquez Swift MD.      I have personally looked at the radiology images and read the final radiology report.    Assessment & Plan     89F PMH newly diagnosed AML in the setting of hx of MDS c/b severe pancytopenia and transfusion dependent anemia, follows w/ Dr. Kelly as outpatient, began treatment today with Dacogen and is being admitted for observation and prophylaxis for TLS     #AML  #Hx MDS  #PPx for TLS  #Severe pancytopenia, transfusion dependent anemia  - Admit for observation, initiate reverse isolation precautions  - Trend CMP, Mg, Phos, Uric Acid.  Placed on electrolyte replacement protocol.  Labs to be texted to Dr. Kelly.  - Started on NS 75cc/hr, monitor for VOL  - Continue Allopurinol, Levaquin, Acyclovir as per Dr. Kelly recommendations  - Transfuse Hgb < 7 or symptomatic, transfuse plts < 10K    #NIDDM Type II  - Hold home Metformin, DM diet, treat FSBG and SSI while inpatient.    F: Oral  E: Monitor & Replace PRN  N: DM diet  PPx: SCDs  Code: Full Code    Dispo: Admit for observation overnight, discharge tomorrow to resume chemotherapy    VTE Prophylaxis:   Mechanical Order History:     Ordered        11/13/19 1836  Place Sequential Compression Device  Once         11/13/19 1836  Maintain Sequential Compression Device  Continuous           Pharmalogical Order History:     None        The patient is high risk due to the following diagnoses/reasons:  AML, requiring frequent monitoring for TLS, severe pancytopenia    Woodrow Stern MD  [facility]  11/13/19  6:38 PM      Electronically signed by Woodrow Stern MD at 11/13/19 1900       Vital Signs (last day)     Date/Time   Temp   Temp src   Pulse   Resp   BP   Patient Position   SpO2    11/14/19 0735   97.4 (36.3)   Oral   73   16   132/70   Lying   95     11/14/19 0400   98 (36.7)   Oral   82   16   130/57   Lying   --    11/14/19 0321   --   --   --   --   --   --   99    11/13/19 2354   --   --   73   16   132/53   Lying   --    11/13/19 2124   98.4 (36.9)   Oral   78   18   136/77   Lying   --    11/13/19 1700   97.8 (36.6)   Oral   82   18   145/60   Sitting   98              Intake & Output (last day)       11/13 0701 - 11/14 0700 11/14 0701 - 11/15 0700          Urine Unmeasured Occurrence 2 x           Lab Results (last 24 hours)     Procedure Component Value Units Date/Time    POC Glucose Once [[id number]]  (Abnormal) Collected:  11/14/19 0744    Specimen:  Blood Updated:  11/14/19 0749     Glucose 170 mg/dL     Magnesium [[id number]]  (Abnormal) Collected:  11/14/19 0652    Specimen:  Blood Updated:  11/14/19 0724     Magnesium 2.5 mg/dL     Comprehensive Metabolic Panel [[id number]]  (Abnormal) Collected:  11/14/19 0446    Specimen:  Blood Updated:  11/14/19 0520     Glucose 164 mg/dL      BUN 9 mg/dL      Creatinine 0.58 mg/dL      Sodium 137 mmol/L      Potassium 3.7 mmol/L      Chloride 101 mmol/L      CO2 23.2 mmol/L      Calcium 8.5 mg/dL      Total Protein 6.2 g/dL      Albumin 3.38 g/dL      ALT (SGPT) 7 U/L      AST (SGOT) 13 U/L      Alkaline Phosphatase 69 U/L      Total Bilirubin 0.5 mg/dL      eGFR Non African Amer 98 mL/min/1.73      Globulin 2.8 gm/dL      A/G Ratio 1.2 g/dL      BUN/Creatinine Ratio 15.5     Anion Gap 12.8 mmol/L     Narrative:       GFR Normal >60  Chronic Kidney Disease <60  Kidney Failure <15    Phosphorus [[id number]]  (Normal) Collected:  11/14/19 0446    Specimen:  Blood Updated:  11/14/19 0520     Phosphorus 3.7 mg/dL     Uric Acid [[id number]]  (Abnormal) Collected:  11/14/19 0446    Specimen:  Blood Updated:  11/14/19 0520     Uric Acid <0.2 mg/dL     Magnesium [[id number]]  (Abnormal) Collected:  11/14/19 0446    Specimen:  Blood Updated:  11/14/19 0520     Magnesium 2.7 mg/dL     Scan Slide [[id number]] Collected:   11/14/19 0049    Specimen:  Blood Updated:  11/14/19 0138     Scan Slide --     Comment: See Manual Differential Results       Manual Differential [[id number]]  (Abnormal) Collected:  11/14/19 0049    Specimen:  Blood Updated:  11/14/19 0138     Neutrophil % 6.0 %      Lymphocyte % 88.0 %      Monocyte % 6.0 %      Neutrophils Absolute 0.11 10*3/mm3      Lymphocytes Absolute 1.57 10*3/mm3      Monocytes Absolute 0.11 10*3/mm3      nRBC 10.0 /100 WBC      Anisocytosis Mod/2+     Hypochromia Slight/1+     Macrocytes Mod/2+     Platelet Morphology Normal    CBC & Differential [[id number]] Collected:  11/14/19 0049    Specimen:  Blood Updated:  11/14/19 0137    Narrative:       The following orders were created for panel order CBC & Differential.  Procedure                               Abnormality         Status                     ---------                               -----------         ------                     CBC Auto Differential[[id number]]        Abnormal            Final result                 Please view results for these tests on the individual orders.    CBC Auto Differential [[id number]]  (Abnormal) Collected:  11/14/19 0049    Specimen:  Blood Updated:  11/14/19 0137     WBC 1.78 10*3/mm3      RBC 2.32 10*6/mm3      Hemoglobin 7.6 g/dL      Hematocrit 25.3 %      .1 fL      MCH 32.8 pg      MCHC 30.0 g/dL      RDW 18.6 %      RDW-SD 71.4 fl      Platelets 22 10*3/mm3     Uric Acid [[id number]]  (Abnormal) Collected:  11/14/19 0049    Specimen:  Blood Updated:  11/14/19 0129     Uric Acid <0.2 mg/dL     Magnesium [[id number]]  (Abnormal) Collected:  11/14/19 0049    Specimen:  Blood Updated:  11/14/19 0129     Magnesium 2.6 mg/dL     Comprehensive Metabolic Panel [[id number]]  (Abnormal) Collected:  11/14/19 0049    Specimen:  Blood Updated:  11/14/19 0126     Glucose 119 mg/dL      BUN 11 mg/dL      Creatinine 0.67 mg/dL      Sodium 140 mmol/L      Potassium 3.9 mmol/L      Chloride 104 mmol/L      CO2  23.2 mmol/L      Calcium 8.6 mg/dL      Total Protein 6.3 g/dL      Albumin 3.28 g/dL      ALT (SGPT) 8 U/L      AST (SGOT) 15 U/L      Alkaline Phosphatase 72 U/L      Total Bilirubin 0.4 mg/dL      eGFR Non African Amer 83 mL/min/1.73      Globulin 3.0 gm/dL      A/G Ratio 1.1 g/dL      BUN/Creatinine Ratio 16.4     Anion Gap 12.8 mmol/L     Narrative:       GFR Normal >60  Chronic Kidney Disease <60  Kidney Failure <15    Phosphorus [[id number]]  (Normal) Collected:  11/14/19 0049    Specimen:  Blood Updated:  11/14/19 0126     Phosphorus 3.6 mg/dL     POC Glucose Once [[id number]]  (Abnormal) Collected:  11/13/19 2013    Specimen:  Blood Updated:  11/13/19 2021     Glucose 212 mg/dL     CBC & Differential [[id number]] Collected:  11/13/19 1933    Specimen:  Blood Updated:  11/13/19 2018    Narrative:       The following orders were created for panel order CBC & Differential.  Procedure                               Abnormality         Status                     ---------                               -----------         ------                     CBC Auto Differential[[id number]]        Abnormal            Final result                 Please view results for these tests on the individual orders.    CBC Auto Differential [[id number]]  (Abnormal) Collected:  11/13/19 1933    Specimen:  Blood Updated:  11/13/19 2018     WBC 1.91 10*3/mm3      RBC 2.29 10*6/mm3      Hemoglobin 7.6 g/dL      Hematocrit 25.9 %      .1 fL      MCH 33.2 pg      MCHC 29.3 g/dL      RDW 18.7 %      RDW-SD 73.5 fl      MPV 12.4 fL      Platelets 22 10*3/mm3     Scan Slide [[id number]] Collected:  11/13/19 1933    Specimen:  Blood Updated:  11/13/19 2018     Scan Slide --     Comment: See Manual Differential Results       Manual Differential [[id number]]  (Abnormal) Collected:  11/13/19 1933    Specimen:  Blood Updated:  11/13/19 2018     Neutrophil % 2.0 %      Lymphocyte % 80.0 %      Monocyte % 18.0 %      Neutrophils Absolute 0.04  10*3/mm3      Lymphocytes Absolute 1.53 10*3/mm3      Monocytes Absolute 0.34 10*3/mm3      nRBC 4.0 /100 WBC      Anisocytosis Mod/2+     Hypochromia Slight/1+     Macrocytes Mod/2+     Platelet Estimate Decreased    Uric Acid [[id number]]  (Abnormal) Collected:  11/13/19 1933    Specimen:  Blood Updated:  11/13/19 2008     Uric Acid 0.3 mg/dL     Comprehensive Metabolic Panel [[id number]]  (Abnormal) Collected:  11/13/19 1933    Specimen:  Blood Updated:  11/13/19 2004     Glucose 193 mg/dL      BUN 12 mg/dL      Creatinine 0.74 mg/dL      Sodium 141 mmol/L      Potassium 4.4 mmol/L      Chloride 105 mmol/L      CO2 23.8 mmol/L      Calcium 8.4 mg/dL      Total Protein 6.5 g/dL      Albumin 3.57 g/dL      ALT (SGPT) 9 U/L      AST (SGOT) 15 U/L      Alkaline Phosphatase 75 U/L      Total Bilirubin 0.4 mg/dL      eGFR Non African Amer 74 mL/min/1.73      Globulin 2.9 gm/dL      A/G Ratio 1.2 g/dL      BUN/Creatinine Ratio 16.2     Anion Gap 12.2 mmol/L     Narrative:       GFR Normal >60  Chronic Kidney Disease <60  Kidney Failure <15    Magnesium [[id number]]  (Abnormal) Collected:  11/13/19 1933    Specimen:  Blood Updated:  11/13/19 2004     Magnesium 1.5 mg/dL     Phosphorus [[id number]]  (Normal) Collected:  11/13/19 1933    Specimen:  Blood Updated:  11/13/19 2004     Phosphorus 3.4 mg/dL         Imaging Results (Last 24 Hours)     ** No results found for the last 24 hours. **        ECG/EMG Results (most recent)     Procedure Component Value Units Date/Time    ECG 12 Lead [[id number]] Collected:  11/13/19 2242     Updated:  11/13/19 2246    Narrative:       Test Reason : tachy  Blood Pressure : **/** mmHG  Vent. Rate : 077 BPM     Atrial Rate : 077 BPM     P-R Int : 128 ms          QRS Dur : 068 ms      QT Int : 386 ms       P-R-T Axes : 011 033 132 degrees     QTc Int : 436 ms    Normal sinus rhythm  Cannot rule out Anterior infarct , age undetermined  Abnormal ECG  When compared with ECG of 10-JUL-2019  20:52,  No significant change was found    Referred By:  NIKKIE           Confirmed By:         Orders (last 24 hrs)     Start     Ordered    11/15/19 0900  cyanocobalamin injection 1,000 mcg  Every 30 Days      11/13/19 1837 11/14/19 0900  levoFLOXacin (LEVAQUIN) tablet 500 mg  Every 24 Hours      11/13/19 1837 11/14/19 0756  Discontinue IV  Once      11/14/19 0756    11/14/19 0756  Discontinue Telemetry  Once      11/14/19 0756    11/14/19 0756  Discontinue Oxygen  Once      11/14/19 0756    11/14/19 0755  Discharge patient  Once      11/14/19 0756    11/14/19 0754  DC orders being placed. Dr. Stern to come by to see prior to her leaving.  Please do not DC until he has evaluated Mrs. Walker.  [facility] Nursing Order (Specify)  Once     Comments:  DC orders being placed. Dr. Stern to come by to see prior to her leaving.  Please do not DC until he has evaluated Mrs. Walker.    11/14/19 0754    11/14/19 0750  POC Glucose Once  Once      11/14/19 0744    11/14/19 0700  Magnesium  Timed      11/13/19 2324    11/14/19 0600  CBC & Differential  Morning Draw,   Status:  Canceled      11/13/19 1836 11/14/19 0600  CBC Auto Differential  PROCEDURE ONCE,   Status:  Canceled      11/14/19 0002    11/14/19 0600  Comprehensive Metabolic Panel  Timed,   Status:  Canceled     Comments:  Please put Uric Acid sample on ice, per Dr. Kelly!      11/14/19 0211    11/14/19 0600  Comprehensive Metabolic Panel  Timed,   Status:  Canceled      11/14/19 0213    11/14/19 0200  CBC & Differential  Timed      11/13/19 2027 11/14/19 0200  CBC Auto Differential  PROCEDURE ONCE      11/13/19 2027 11/14/19 0105  Manual Differential  Once      11/14/19 0104    11/14/19 0103  Scan Slide  Once      11/14/19 0102    11/14/19 0000  Discharge Follow-up with Specified Provider: Keep Dr. Kelly appointment this afternoon for chemotherapy; Today      11/14/19 0756    11/13/19 2200  POC Glucose 4x Daily AC & at Bedtime  4 Times Daily Before Meals  & at Bedtime      11/13/19 1843    11/13/19 2200  magnesium sulfate 2g/50 mL (PREMIX) infusion  Every 2 Hours      11/13/19 2013 11/13/19 2100  acyclovir (ZOVIRAX) tablet 400 mg  2 Times Daily      11/13/19 1837 11/13/19 2100  allopurinol (ZYLOPRIM) tablet 100 mg  2 Times Daily      11/13/19 1837 11/13/19 2100  insulin aspart (novoLOG) injection 0-7 Units  4 Times Daily Before Meals & Nightly      11/13/19 1843 11/13/19 2022  POC Glucose Once  Once      11/13/19 2013 11/13/19 2010  Manual Differential  Once      11/13/19 2009 11/13/19 1940  Scan Slide  Once      11/13/19 1939 11/13/19 1930  sodium chloride 0.9 % infusion  Continuous      11/13/19 1836 11/13/19 1844  Hypoglycemia Treatment - Alert Patient That is Not NPO and Can Safely Swallow  Until Discontinued     Comments:  Administer 4 oz Fruit Juice OR 4 oz Regular Soda OR 8 oz Milk OR 15-30 grams (1 tube) of Glucose Gel.  Recheck Blood Glucose 15 Minutes After Ingestion, Repeat Treatment & Continue to Recheck Blood Sugar Every 15 Minutes Until Blood Glucose is 70 mg/dL or Higher.  Once Blood Glucose is 70 mg/dL or Higher and if It Will Be more than 60 Minutes Until the Next Meal, Provide Appropriate Snack (Including Carbohydrate Food) Based on Meal Plan Order. Give Meal Tray As Soon As Possible.    11/13/19 1843 11/13/19 1844  Hypoglycemia Treatment - Patient Has IV Access - Unresponsive, NPO or Unable To Safely Swallow  Until Discontinued     Comments:  Administer 25g (50ml) D50W IV Push.  Recheck Blood Glucose 15 Minutes After Administration, if Blood Glucose Remains Less Than 70 mg/dl, Repeat Treatment   Recheck Blood Glucose 15 Minutes After 2nd Administration, if Blood Glucose Remains Less Than 70 mg/dL After 2nd Dose of D50W, Contact Provider for Further Treatment Orders & Consider Adding IVF With D5W for Maintenance    11/13/19 1843 11/13/19 1844  Hypoglycemia Treatment - Patient Without IV Access - Unresponsive, NPO or  Unable To Safely Swallow  Until Discontinued     Comments:  Administer 1mg Glucagon SQ & Establish IV Access.  Turn Patient on Side - Nausea / Vomiting May Occur.  Recheck Blood Glucose 15 Minutes After Administration.  If Blood Glucose Remains Less Than 70, Administer 25g D50W IV Push (50ml).  Recheck Blood Glucose 15 Minutes After Administration of D50W, if Blood Glucose Remains Less Than 70 mg/dl, Contact Provider for Further Treatment Orders & Consider Adding IVF With D5 for Maintenance    11/13/19 1843 11/13/19 1844  Hypoglycemia Treatment - Document Event & Patient Response to Interventions EMR, Document Medications on MAR  Continuous      11/13/19 1843 11/13/19 1844  Notify Provider - Hypoglycemia Treatment  Until Discontinued      11/13/19 1843 11/13/19 1844  Patient Currently On Electrolyte Replacement Protocol - Please Refer to MAR for Protocol Details  Misc Nursing Order (Specify)  Daily     Comments:  Patient Currently On Electrolyte Replacement Protocol - Please Refer to MAR for Protocol Details    11/13/19 1843 11/13/19 1843  potassium phosphate 45 mmol in sodium chloride 0.9 % 500 mL infusion  As Needed      11/13/19 1843 11/13/19 1843  potassium phosphate 30 mmol in sodium chloride 0.9 % 250 mL infusion  As Needed      11/13/19 1843 11/13/19 1843  potassium phosphate 15 mmol in sodium chloride 0.9 % 100 mL infusion  As Needed      11/13/19 1843 11/13/19 1843  sodium phosphates 45 mmol in sodium chloride 0.9 % 500 mL IVPB  As Needed      11/13/19 1843 11/13/19 1843  sodium phosphates 30 mmol in sodium chloride 0.9 % 250 mL IVPB  As Needed      11/13/19 1843 11/13/19 1843  sodium phosphates 15 mmol in sodium chloride 0.9 % 250 mL IVPB  As Needed      11/13/19 1843 11/13/19 1843  Magnesium Sulfate 2 gram Bolus, followed by 8 gram infusion (total Mg dose 10 grams)- Mg less than or equal to 1mg/dL  As Needed      11/13/19 1843 11/13/19 1843  Magnesium Sulfate 2 gram / 50mL  Infusion (GIVE X 3 BAGS TO EQUAL 6GM TOTAL DOSE) - Mg 1.1 - 1.5 mg/dl  As Needed      11/13/19 1843    11/13/19 1843  Magnesium Sulfate 4 gram infusion- Mg 1.6-1.9 mg/dL  As Needed      11/13/19 1843    11/13/19 1843  potassium chloride 10 mEq in 100 mL IVPB  Every 1 Hour PRN      11/13/19 1843    11/13/19 1843  dextrose (D50W) 25 g/ 50mL Intravenous Solution 25 g  Every 15 Minutes PRN      11/13/19 1843    11/13/19 1843  glucagon (human recombinant) (GLUCAGEN DIAGNOSTIC) injection 1 mg  Every 15 Minutes PRN      11/13/19 1843    11/13/19 1843  Do NOT Hold Basal or Correction Scale Insulin When Patient is NPO, Hold Scheduled Mealtime (Bolus) Insulin if NPO  Continuous      11/13/19 1843    11/13/19 1843  Follow [facility] Hypoglycemia Standing Orders For Blood Glucose Less Than 70 mg/dL  Until Discontinued      11/13/19 1843    11/13/19 1843  dextrose (GLUTOSE) oral gel 15 g  Every 15 Minutes PRN      11/13/19 1843    11/13/19 1840  ECG 12 Lead  Once      11/13/19 1839    11/13/19 1837  Comprehensive Metabolic Panel  Every 6 Hours      11/13/19 1836    11/13/19 1837  Magnesium  Every 6 Hours      11/13/19 1836    11/13/19 1837  Phosphorus  Every 6 Hours      11/13/19 1836    11/13/19 1837  Uric Acid  Every 6 Hours      11/13/19 1836    11/13/19 1837  CBC & Differential  STAT      11/13/19 1836    11/13/19 1837  CBC Auto Differential  PROCEDURE ONCE      11/13/19 1837    11/13/19 1834  Cardiac Monitoring  Continuous      11/13/19 1836    11/13/19 1834  Place Sequential Compression Device  Once      11/13/19 1836    11/13/19 1834  Maintain Sequential Compression Device  Continuous      11/13/19 1836    11/13/19 1833  Initiate Observation Status  Once      11/13/19 1836    11/13/19 1811  Diet Regular; Consistent Carbohydrate  Diet Effective Now      11/13/19 1810    11/13/19 1750  Inpatient Case Management  Consult  Once     Provider:  (Not yet assigned)    11/13/19 1750    11/13/19 1714  isolation box  Once       11/13/19 1713    11/12/19 0000  levoFLOXacin (LEVAQUIN) 500 MG tablet  Daily      11/13/19 1805    --  cyanocobalamin 1000 MCG/ML injection  Every 30 Days      11/13/19 1805    --  allopurinol (ZYLOPRIM) 100 MG tablet  2 Times Daily      11/13/19 1805    --  acyclovir (ZOVIRAX) 400 MG tablet  2 Times Daily      11/13/19 1805               Discharge Summary      Shruthi Proctor PA-C at 11/14/19 0756              [facility]  DISCHARGE SUMMARY    Date of Admission: 11/13/2019    Date of Discharge:  11/14/2019    PCP: Bryant Cason MD    Discharging Provider: Shruthi Proctor PA-C  Attending Physician on day of DC: Dr. Woodrow Vasquez    Admission Diagnosis:   Please see admission H&P    Discharge Diagnosis:   · AML  · History of myelodysplastic syndrome  · Severe pancytopenia with transfusion dependent anemia  · Non-insulin-dependent diabetes mellitus type 2  · Advanced age  · Hypomagnesemia      HPI     History of Present Illness:  Shruthi Walker is a 89 y.o. female this past medical history significant for non-insulin-dependent diabetes mellitus, myelodysplastic syndrome, arthritis and AML.  She was admitted to the observation unit as a direct admit from the office of Dr. Kelly for monitoring of labs closely and prophylaxis for tumor lysis syndrome with fluids.       Hospital Course     Hospital Course  Shruthi Walker was admitted as outlined in above HPI.  She did receive IV fluids.  In addition, treatment with allopurinol, Levaquin, and acyclovir was continued per recommendations by Dr. Kelly.   She did not require transfusion during observation.  She did have some hypomagnesemia for which she received electrolyte replacement per protocol.  Metformin was held while hospitalized with sliding scale insulin utilized.  Given her advanced age, would ultimately consider discontinuing metformin from her medication regimen; however, at present, will leave to the  discretion of her primary care provider.    On the morning of November 14, 2019, she is feeling well reported she felt significantly better than when she arrived yesterday following chemotherapy.  She is being discharged this morning in order to make her noon chemotherapy appointment.    Discussed with AM HANNAH Villa on day of discharge.     Will leave further labs to the discretion of Dr. Kelly.  Lab values remained stable while observed and can be reviewed in pertinent laboratory and radiology results below.       Pertinent Laboratory and Radiology Results     Pertinent Test Results:          Results from last 7 days   Lab Units 11/14/19 0446 11/14/19 0049 11/13/19 1933 11/11/19  1224   WBC 10*3/mm3  --  1.78* 1.91* 1.39*   HEMOGLOBIN g/dL  --  7.6* 7.6* 8.8*   HEMATOCRIT %  --  25.3* 25.9* 28.8*   PLATELETS 10*3/mm3  --  22* 22* 24*   MCV fL  --  109.1* 113.1* 106.7*   SODIUM mmol/L 137 140 141 140   POTASSIUM mmol/L 3.7 3.9 4.4 3.9   CHLORIDE mmol/L 101 104 105 100   CO2 mmol/L 23.2 23.2 23.8 27.0   BUN mg/dL 9 11 12 12   CREATININE mg/dL 0.58 0.67 0.74 0.66   GLUCOSE mg/dL 164* 119* 193* 245*   CALCIUM mg/dL 8.5 8.6 8.4 8.8          Results from last 7 days   Lab Units 11/14/19 0049 11/13/19 1933 11/11/19  1224   WBC 10*3/mm3 1.78* 1.91* 1.39*     Results from last 7 days   Lab Units 11/14/19 0446 11/14/19 0049 11/13/19 1933 11/11/19  1224   BILIRUBIN mg/dL 0.5 0.4 0.4 0.7   ALK PHOS U/L 69 72 75 83   ALT (SGPT) U/L 7 8 9 9   AST (SGOT) U/L 13 15 15 15           Invalid input(s): TG, LDLCALC, LDLREALC      Brief Urine Lab Results  (Last result in the past 365 days)      Color   Clarity   Blood   Leuk Est   Nitrite   Protein   CREAT   Urine HCG        11/06/19 1128 Yellow Clear Negative Negative Negative Trace                                   ----------------------------------------------------------------------------------------------------------------------  Ct Lumbar Spine Without Contrast    Result  Date: 10/15/2019  Mild compression fracture of the superior endplate of L1 but no significant retropulsion  This report was finalized on 10/15/2019 4:41 PM by Dr. Velasquez Swift MD.      Ct Pelvis Without Contrast    Result Date: 10/15/2019  Appearance concerning for fracture of the pubic symphysis on the left without significant displacement.  This report was finalized on 10/15/2019 4:43 PM by Dr. Velasquez Swift MD.          Microbiology Results (last 10 days)     ** No results found for the last 240 hours. **          Labs above have been reviewed on the day of discharge.  Radiology images from prior 30 days were reviewed prior to discharge as incorporated into this document.     Discharge Vitals and Physical Examination       Vital Signs  Temp:  [97.4 °F (36.3 °C)-98.4 °F (36.9 °C)] 97.4 °F (36.3 °C)  Heart Rate:  [73-82] 73  Resp:  [16-18] 16  BP: (130-145)/(53-77) 132/70     PHYSICAL EXAMINATION:   Physical Exam   Constitutional: She is oriented to person, place, and time and well-developed, well-nourished, and in no distress.   HENT:   Head: Normocephalic and atraumatic.   Eyes: EOM are normal. Pupils are equal, round, and reactive to light.   Neck: Normal range of motion. Neck supple.   Cardiovascular: Normal rate and regular rhythm.   Pulmonary/Chest: Effort normal and breath sounds normal.   Abdominal: Soft. Bowel sounds are normal.   Musculoskeletal: Normal range of motion. She exhibits no edema or deformity.   Neurological: She is alert and oriented to person, place, and time.   Skin: Skin is warm and dry.   Psychiatric: Affect and judgment normal.         Discharge Disposition, Discharge Medications, and Discharge Appointments     Discharge Disposition:   home    Condition on Discharge:  Fair    Discharge Medications:     Discharge Medications      Continue These Medications      Instructions Start Date   acyclovir 400 MG tablet  Commonly known as:  ZOVIRAX   400 mg, Oral, 2 Times Daily      allopurinol  100 MG tablet  Commonly known as:  ZYLOPRIM   100 mg, Oral, 2 Times Daily      cyanocobalamin 1000 MCG/ML injection   1,000 mcg, Intramuscular, Every 30 Days      levoFLOXacin 500 MG tablet  Commonly known as:  LEVAQUIN   500 mg, Oral, Daily      metFORMIN 500 MG tablet  Commonly known as:  GLUCOPHAGE   500 mg, Oral, 3 Times Daily             Discharged medication regimen discussed with attending physician prior to discharge.     Discharge Diet:   diabetic diet  Dietary Orders (From admission, onward)    Start     Ordered    11/13/19 1811  Diet Regular; Consistent Carbohydrate  Diet Effective Now     Question Answer Comment   Diet Texture / Consistency Regular    Common Modifiers Consistent Carbohydrate        11/13/19 1810          Activity at Discharge:  activity as tolerated         Discharge Disposition:    Home or Self Care        Follow-up Appointments:      Additional Instructions for the Follow-ups that You Need to Schedule     Discharge Follow-up with Specified Provider: Keep Dr. Kelly appointment this afternoon for chemotherapy; Today   As directed      To:  Keep Dr. Kelly appointment this afternoon for chemotherapy    Follow Up:  Today           Follow-up Information     Bryant Cason MD .    Specialty:  Family Medicine  Contact information:  [facility] [address] [place] [id number] [phone]                   Additional Instructions for the Follow-ups that You Need to Schedule     Discharge Follow-up with Specified Provider: Keep Dr. Kelly appointment this afternoon for chemotherapy; Today   As directed      To:  Keep Dr. Kelly appointment this afternoon for chemotherapy    Follow Up:  Today               Test Results Pending at Discharge:           Shruthi Proctor PA-C  [facility] Medicine Team  11/14/19  8:03 AM      Time: Greater than 30 minutes spent on this discharge.              Electronically signed by Shruthi Proctor PA-C at 11/14/19 0804       Discharge Order (From  admission, onward)    Start     Ordered    11/14/19 0755  Discharge patient  Once     Expected Discharge Date:  11/14/19    Discharge Disposition:  Home or Self Care    Physician of Record for Attribution - Please select from Treatment Team:  JOEL LUNDY [[id number]]    Review needed by CMO to determine Physician of Record:  No       Question Answer Comment   Physician of Record for Attribution - Please select from Treatment Team JOEL LUNDY    Review needed by CMO to determine Physician of Record No        11/14/19 0756

## 2019-11-14 NOTE — NURSING NOTE
Spoke with HANNAH Tripathi pt's infusion nurse, states pt can be discharged home with iv as she is going to go straight over for her appointment today.

## 2019-11-22 PROBLEM — R50.81 NEUTROPENIC FEVER (HCC): Status: ACTIVE | Noted: 2019-01-01

## 2019-11-22 PROBLEM — N39.0 UTI (URINARY TRACT INFECTION): Status: RESOLVED | Noted: 2019-01-01 | Resolved: 2019-01-01

## 2019-11-22 PROBLEM — D70.9 NEUTROPENIC FEVER (HCC): Status: ACTIVE | Noted: 2019-01-01

## 2019-11-29 NOTE — OUTREACH NOTE
Prep Survey      Responses   Facility patient discharged from?  [facility]   Is patient eligible?  Yes   Discharge diagnosis  Neutropenic fever, sepsis POA, KASIE HCAP, AML, acute hypomagnesemia, Pancytopenia likely chemo induced, macrocytosis with known B12 def.  NIDDM II, hypoalbuminemia, thrush w/laryngeal symptoms   Does the patient have one of the following disease processes/diagnoses(primary or secondary)?  Sepsis [Pneumonia]   Does the patient have Home health ordered?  Yes   What is the Home health agency?   [facility]   Is there a DME ordered?  No   Comments regarding appointments  Pt to schedule follow up with PCP   Prep survey completed?  Yes          Amira Herrera RN

## 2019-12-02 NOTE — OUTREACH NOTE
Sepsis Week 1 Survey      Responses   Facility patient discharged from?  [facility]   Does the patient have one of the following disease processes/diagnoses(primary or secondary)?  Sepsis   Is there a successful TCM telephone encounter documented?  No   Week 1 attempt successful?  Yes   Call start time  0948   Call end time  0954   Discharge diagnosis  Neutropenic fever, sepsis POA, KASIE HCAP, AML, acute hypomagnesemia, Pancytopenia likely chemo induced, macrocytosis with known B12 def.  NIDDM II, hypoalbuminemia, thrush w/laryngeal symptoms   Person spoke with today (if not patient) and relationship  Brigida-daughter   Meds reviewed with patient/caregiver?  Yes   Is the patient having any side effects they believe may be caused by any medication additions or changes?  No   Does the patient have all medications related to this admission filled (includes all antibiotics, inhalers, nebulizers,steroids,etc.)  Yes   Is the patient taking all medications as directed (includes completed medication regime)?  Yes   Does the patient have a primary care provider?   Yes   Does the patient have an appointment with their PCP within 7 days of discharge?  No [Cancelled appt for tomorrow, will see what oncologist says today and will possibly reschedule]   Has the patient kept scheduled appointments due by today?  N/A   Comments  12/2 Sees Oncologist today   Home health comments  States HH was not resumed but plans to ask at appt today to have some services   Psychosocial issues?  No   Did the patient receive a copy of their discharge instructions?  Yes   Nursing interventions  Reviewed instructions with patient   What is the patient's perception of their health status since discharge?  Improving   Is the patient/caregiver able to teach back Sepsis?  S - Shivering,fever or very cold, P - Pale or discolored skin, S - Sleepy, difficult to arouse,confused, S - Short of breath   Is patient/caregiver able to teach back steps to recovery at  home?  Set small, achievable goals for return to baseline health, Eat a balanced diet   Is the patient/caregiver able to teach back signs and symptoms of worsening condition:  Fever, Rapid heart rate (>90), Altered mental status(confusion/coma), Hyperthermia, Shortness of breath/rapid respiratory rate   Is the patient/caregiver able to teach back the hierarchy of who to call/visit for symptoms/problems? PCP, Specialist, Home health nurse, Urgent Care, ED, 911  Yes   Week 1 call completed?  Yes          Amira Mcbride RN

## 2019-12-06 NOTE — PATIENT INSTRUCTIONS
Platelet Transfusion  A platelet transfusion is a procedure in which you receive donated platelets through an IV. Platelets are tiny pieces of blood cells. When you get an injury, platelets clump together in the area to form a blood clot. This helps stop bleeding and is the beginning of the healing process. If you have too few platelets, your blood may have trouble clotting. This may cause you to bleed and bruise very easily.  You may need a platelet transfusion if you have a condition that causes a low number of platelets (thrombocytopenia). A platelet transfusion may be used to stop or prevent excessive bleeding.  Tell a health care provider about:  · Any reactions you have had during previous transfusions.  · Any allergies you have.  · All medicines you are taking, including vitamins, herbs, eye drops, creams, and over-the-counter medicines.  · Any blood disorders you have.  · Any surgeries you have had.  · Any medical conditions you have.  · Whether you are pregnant or may be pregnant.  What are the risks?  Generally, this is a safe procedure. However, problems may occur, including:  · Fever.  · Infection.  · Allergic reaction to the donor platelets.  · Your body's disease-fighting system (immune system) attacking the donor platelets (hemolytic reaction). This is rare.  · A rare reaction that causes lung damage (transfusion-related acute lung injury).  What happens before the procedure?  Medicines  · Ask your health care provider about:  ? Changing or stopping your regular medicines. This is especially important if you are taking diabetes medicines or blood thinners.  ? Taking medicines such as aspirin and ibuprofen. These medicines can thin your blood. Do not take these medicines unless your health care provider tells you to take them.  ? Taking over-the-counter medicines, vitamins, herbs, and supplements.  General instructions  · You will have a blood test to determine your blood type. Your blood type  determines what kind of platelets you will be given.  · Follow instructions from your health care provider about eating or drinking restrictions.  · If you have had an allergic reaction to a transfusion in the past, you may be given medicine to help prevent a reaction.  · Your temperature, blood pressure, pulse, and breathing will be monitored.  What happens during the procedure?    · An IV will be inserted into one of your veins.  · For your safety, two health care providers will verify your identity along with the donor platelets about to be infused.  · A bag of donor platelets will be connected to your IV. The platelets will flow into your bloodstream. This usually takes 30-60 minutes.  · Your temperature, blood pressure, pulse, and breathing will be monitored during the transfusion. This helps detect early signs of any reaction.  · You will also be monitored for other symptoms that may indicate a reaction, including chills, hives, or itching.  · If you have signs of a reaction at any time, your transfusion will be stopped, and you may be given medicine to help manage the reaction.  · When your transfusion is complete, your IV will be removed.  · Pressure may be applied to the IV site for a few minutes to stop any bleeding.  · The IV site will be covered with a bandage (dressing).  The procedure may vary among health care providers and hospitals.  What happens after the procedure?  · Your blood pressure, temperature, pulse, and breathing will be monitored until you leave the hospital or clinic.  · You may have some bruising and soreness at your IV site.  Follow these instructions at home:  Medicines  · Take over-the-counter and prescription medicines only as told by your health care provider.  · Talk with your health care provider before you take any medicines that contain aspirin or NSAIDs. These medicines increase your risk for dangerous bleeding.  General instructions  · Change or remove your dressing as told  by your health care provider.  · Return to your normal activities as told by your health care provider. Ask your health care provider what activities are safe for you.  · Do not take baths, swim, or use a hot tub until your health care provider approves. Ask your health care provider if you may take showers.  · Check your IV site every day for signs of infection. Check for:  ? Redness, swelling, or pain.  ? Fluid or blood. If fluid or blood drains from your IV site, use your hands to press down firmly on a bandage covering the area for a minute or two. Doing this should stop the bleeding.  ? Warmth.  ? Pus or a bad smell.  · Keep all follow-up visits as told by your health care provider. This is important.  Contact a health care provider if you have:  · A headache that does not go away with medicine.  · Hives, rash, or itchy skin.  · Nausea or vomiting.  · Unusual tiredness or weakness.  · Signs of infection at your IV site.  Get help right away if:  · You have a fever or chills.  · You urinate less often than usual.  · Your urine is darker colored than normal.  · You have any of the following:  ? Trouble breathing.  ? Pain in your back, abdomen, or chest.  ? Cool, clammy skin.  ? A fast heartbeat.  Summary  · Platelets are tiny pieces of blood cells that clump together to form a blood clot when you have an injury. If you have too few platelets, your blood may have trouble clotting.  · A platelet transfusion is a procedure in which you receive donated platelets through an IV.  · A platelet transfusion may be used to stop or prevent excessive bleeding.  · After the procedure, check your IV site every day for signs of infection, including redness, swelling, pain, or warmth.  This information is not intended to replace advice given to you by your health care provider. Make sure you discuss any questions you have with your health care provider.  Document Released: 10/14/2008 Document Revised: 01/23/2019 Document  Reviewed: 01/23/2019  [employer] Interactive Patient Education © 2019 [employer]

## 2019-12-10 NOTE — OUTREACH NOTE
Sepsis Week 2 Survey      Responses   Facility patient discharged from?  [facility]   Does the patient have one of the following disease processes/diagnoses(primary or secondary)?  Sepsis   Week 2 attempt successful?  Yes   Call start time  [phone]   Call end time  [phone]   Discharge diagnosis  Neutropenic fever, sepsis POA, KASIE HCAP, AML, acute hypomagnesemia, Pancytopenia likely chemo induced, macrocytosis with known B12 def.  NIDDM II, hypoalbuminemia, thrush w/laryngeal symptoms   Person spoke with today (if not patient) and relationship  Brigida-daughter   Meds reviewed with patient/caregiver?  Yes   Is the patient taking all medications as directed (includes completed medication regime)?  Yes   Medication comments  Pt received script for magic mouth wash 12/9/19 from Dr. Kelly   Has the patient kept scheduled appointments due by today?  Yes   Comments  Appointment with Dr. Kelly 12/9/19   What is the Home health agency?   [facility]   Has home health visited the patient within 72 hours of discharge?  Yes   Home health comments   does lab work 3 X per week   Psychosocial issues?  No   What is the patient's perception of their health status since discharge?  Same   Nursing interventions  Nurse provided patient education   Is the patient/caregiver able to teach back Sepsis?  S - Shivering,fever or very cold, S - Sleepy, difficult to arouse,confused   Nursing interventions  Nurse provided patient education   Is patient/caregiver able to teach back steps to recovery at home?  Exercise as tolerated, Rest and regain strength, Set small, achievable goals for return to baseline health   Is the patient/caregiver able to teach back signs and symptoms of worsening condition:  Fever, Hyperthermia, Altered mental status(confusion/coma)   Week 2 call completed?  Yes          Nicolle Flynn RN

## 2019-12-11 NOTE — PATIENT INSTRUCTIONS
Platelet Transfusion  A platelet transfusion is a procedure in which you receive donated platelets through an IV. Platelets are tiny pieces of blood cells. When you get an injury, platelets clump together in the area to form a blood clot. This helps stop bleeding and is the beginning of the healing process. If you have too few platelets, your blood may have trouble clotting. This may cause you to bleed and bruise very easily.  You may need a platelet transfusion if you have a condition that causes a low number of platelets (thrombocytopenia). A platelet transfusion may be used to stop or prevent excessive bleeding.  Tell a health care provider about:  · Any reactions you have had during previous transfusions.  · Any allergies you have.  · All medicines you are taking, including vitamins, herbs, eye drops, creams, and over-the-counter medicines.  · Any blood disorders you have.  · Any surgeries you have had.  · Any medical conditions you have.  · Whether you are pregnant or may be pregnant.  What are the risks?  Generally, this is a safe procedure. However, problems may occur, including:  · Fever.  · Infection.  · Allergic reaction to the donor platelets.  · Your body's disease-fighting system (immune system) attacking the donor platelets (hemolytic reaction). This is rare.  · A rare reaction that causes lung damage (transfusion-related acute lung injury).  What happens before the procedure?  Medicines  · Ask your health care provider about:  ? Changing or stopping your regular medicines. This is especially important if you are taking diabetes medicines or blood thinners.  ? Taking medicines such as aspirin and ibuprofen. These medicines can thin your blood. Do not take these medicines unless your health care provider tells you to take them.  ? Taking over-the-counter medicines, vitamins, herbs, and supplements.  General instructions  · You will have a blood test to determine your blood type. Your blood type  determines what kind of platelets you will be given.  · Follow instructions from your health care provider about eating or drinking restrictions.  · If you have had an allergic reaction to a transfusion in the past, you may be given medicine to help prevent a reaction.  · Your temperature, blood pressure, pulse, and breathing will be monitored.  What happens during the procedure?    · An IV will be inserted into one of your veins.  · For your safety, two health care providers will verify your identity along with the donor platelets about to be infused.  · A bag of donor platelets will be connected to your IV. The platelets will flow into your bloodstream. This usually takes 30-60 minutes.  · Your temperature, blood pressure, pulse, and breathing will be monitored during the transfusion. This helps detect early signs of any reaction.  · You will also be monitored for other symptoms that may indicate a reaction, including chills, hives, or itching.  · If you have signs of a reaction at any time, your transfusion will be stopped, and you may be given medicine to help manage the reaction.  · When your transfusion is complete, your IV will be removed.  · Pressure may be applied to the IV site for a few minutes to stop any bleeding.  · The IV site will be covered with a bandage (dressing).  The procedure may vary among health care providers and hospitals.  What happens after the procedure?  · Your blood pressure, temperature, pulse, and breathing will be monitored until you leave the hospital or clinic.  · You may have some bruising and soreness at your IV site.  Follow these instructions at home:  Medicines  · Take over-the-counter and prescription medicines only as told by your health care provider.  · Talk with your health care provider before you take any medicines that contain aspirin or NSAIDs. These medicines increase your risk for dangerous bleeding.  General instructions  · Change or remove your dressing as told  by your health care provider.  · Return to your normal activities as told by your health care provider. Ask your health care provider what activities are safe for you.  · Do not take baths, swim, or use a hot tub until your health care provider approves. Ask your health care provider if you may take showers.  · Check your IV site every day for signs of infection. Check for:  ? Redness, swelling, or pain.  ? Fluid or blood. If fluid or blood drains from your IV site, use your hands to press down firmly on a bandage covering the area for a minute or two. Doing this should stop the bleeding.  ? Warmth.  ? Pus or a bad smell.  · Keep all follow-up visits as told by your health care provider. This is important.  Contact a health care provider if you have:  · A headache that does not go away with medicine.  · Hives, rash, or itchy skin.  · Nausea or vomiting.  · Unusual tiredness or weakness.  · Signs of infection at your IV site.  Get help right away if:  · You have a fever or chills.  · You urinate less often than usual.  · Your urine is darker colored than normal.  · You have any of the following:  ? Trouble breathing.  ? Pain in your back, abdomen, or chest.  ? Cool, clammy skin.  ? A fast heartbeat.  Summary  · Platelets are tiny pieces of blood cells that clump together to form a blood clot when you have an injury. If you have too few platelets, your blood may have trouble clotting.  · A platelet transfusion is a procedure in which you receive donated platelets through an IV.  · A platelet transfusion may be used to stop or prevent excessive bleeding.  · After the procedure, check your IV site every day for signs of infection, including redness, swelling, pain, or warmth.  This information is not intended to replace advice given to you by your health care provider. Make sure you discuss any questions you have with your health care provider.  Document Released: 10/14/2008 Document Revised: 01/23/2019 Document  Reviewed: 01/23/2019  [employer] Interactive Patient Education © 2019 [employer]  Blood Transfusion, Adult  A blood transfusion is a procedure in which you are given blood through an IV tube. You may need this procedure because of:  · Illness.  · Surgery.  · Injury.  The blood may come from someone else (a donor). You may also be able to donate blood for yourself (autologous blood donation). The blood given in a transfusion is made up of different types of cells. You may get:  · Red blood cells. These carry oxygen to the cells in the body.  · White blood cells. These help you fight infections.  · Platelets. These help your blood to clot.  · Plasma. This is the liquid part of your blood. It helps with fluid imbalances.  If you have a clotting disorder, you may also get other types of blood products.  What happens before the procedure?  · You will have a blood test to find out your blood type. The test also finds out what type of blood your body will accept and matches it to the donor type.  · If you are going to have a planned surgery, you may be able to donate your own blood. This may be done in case you need a transfusion.  · If you have had an allergic reaction to a transfusion in the past, you may be given medicine to help prevent a reaction. This medicine may be given to you by mouth or through an IV.  · You will have your temperature, blood pressure, and pulse checked.  · Follow instructions from your doctor about what you cannot eat or drink.  · Ask your doctor about:  ? Changing or stopping your regular medicines. This is important if you take diabetes medicines or blood thinners.  ? Taking medicines such as aspirin and ibuprofen. These medicines can thin your blood. Do not take these medicines before your procedure if your doctor tells you not to.  What happens during the procedure?  · An IV tube will be put into one of your veins.  · The bag of donated blood will be attached to your IV tube. Then, the  blood will enter through your vein.  · Your temperature, blood pressure, and pulse will be checked regularly during the procedure. This is done to find early signs of a transfusion reaction.  · If you have any signs or symptoms of a reaction, your transfusion will be stopped. You may also be given medicine.  · When the transfusion is done, your IV tube will be taken out.  · Pressure may be applied to the IV site for a few minutes.  · A bandage (dressing) will be put on the IV site.  The procedure may vary among doctors and hospitals.  What happens after the procedure?  · Your temperature, blood pressure, heart rate, breathing rate, and blood oxygen level will be checked often.  · Your blood may be tested to see how you are responding to the transfusion.  · You may be warmed with fluids or blankets. This is done to keep the temperature of your body normal.  Summary  · A blood transfusion is a procedure in which you are given blood through an IV tube.  · The blood may come from someone else (a donor). You may also be able to donate blood for yourself.  · If you have had an allergic reaction to a transfusion in the past, you may be given medicine to help prevent a reaction. This medicine may be given to you by mouth or through an IV tube.  · Your temperature, blood pressure, heart rate, breathing rate, and blood oxygen level will be checked often.  · Your blood may be tested to see how you are responding to the transfusion.  This information is not intended to replace advice given to you by your health care provider. Make sure you discuss any questions you have with your health care provider.  Document Released: 03/16/2010 Document Revised: 08/11/2017 Document Reviewed: 08/11/2017  [employer] Interactive Patient Education © 2019 [employer]

## 2019-12-18 NOTE — OUTREACH NOTE
Sepsis Week 3 Survey      Responses   Facility patient discharged from?  [facility]   Does the patient have one of the following disease processes/diagnoses(primary or secondary)?  Sepsis   Week 3 attempt successful?  No   Unsuccessful attempts  Attempt 1          Hillary Fraser RN

## 2019-12-19 NOTE — PATIENT INSTRUCTIONS
Blood Transfusion, Adult    A blood transfusion is a procedure in which you receive donated blood, including plasma, platelets, and red blood cells, through an IV tube. You may need a blood transfusion because of illness, surgery, or injury. The blood may come from a donor. You may also be able to donate blood for yourself (autologous blood donation) before a surgery if you know that you might require a blood transfusion.  The blood given in a transfusion is made up of different types of cells. You may receive:  · Red blood cells. These carry oxygen to the cells in the body.  · White blood cells. These help you fight infections.  · Platelets. These help your blood to clot.  · Plasma. This is the liquid part of your blood and it helps with fluid imbalances.  If you have hemophilia or another clotting disorder, you may also receive other types of blood products.  Tell a health care provider about:  · Any allergies you have.  · All medicines you are taking, including vitamins, herbs, eye drops, creams, and over-the-counter medicines.  · Any problems you or family members have had with anesthetic medicines.  · Any blood disorders you have.  · Any surgeries you have had.  · Any medical conditions you have, including any recent fever or cold symptoms.  · Whether you are pregnant or may be pregnant.  · Any previous reactions you have had during a blood transfusion.  What are the risks?  Generally, this is a safe procedure. However, problems may occur, including:  · Having an allergic reaction to something in the donated blood. Hives and itching may be symptoms of this type of reaction.  · Fever. This may be a reaction to the white blood cells in the transfused blood. Nausea or chest pain may accompany a fever.  · Iron overload. This can happen from having many transfusions.  · Transfusion-related acute lung injury (TRALI). This is a rare reaction that causes lung damage. The cause is not known. TRALI can occur within hours  of a transfusion or several days later.  · Sudden (acute) or delayed hemolytic reactions. This happens if your blood does not match the cells in your transfusion. Your body’s defense system (immune system) may try to attack the new cells. This complication is rare. The symptoms include fever, chills, nausea, and low back pain or chest pain.  · Infection or disease transmission. This is rare.  What happens before the procedure?  · You will have a blood test to determine your blood type. This is necessary to know what kind of blood your body will accept and to match it to the donor blood.  · If you are going to have a planned surgery, you may be able to do an autologous blood donation. This may be done in case you need to have a transfusion.  · If you have had an allergic reaction to a transfusion in the past, you may be given medicine to help prevent a reaction. This medicine may be given to you by mouth or through an IV tube.  · You will have your temperature, blood pressure, and pulse monitored before the transfusion.  · Follow instructions from your health care provider about eating and drinking restrictions.  · Ask your health care provider about:  ? Changing or stopping your regular medicines. This is especially important if you are taking diabetes medicines or blood thinners.  ? Taking medicines such as aspirin and ibuprofen. These medicines can thin your blood. Do not take these medicines before your procedure if your health care provider instructs you not to.  What happens during the procedure?  · An IV tube will be inserted into one of your veins.  · The bag of donated blood will be attached to your IV tube. The blood will then enter through your vein.  · Your temperature, blood pressure, and pulse will be monitored regularly during the transfusion. This monitoring is done to detect early signs of a transfusion reaction.  · If you have any signs or symptoms of a reaction, your transfusion will be stopped and  you may be given medicine.  · When the transfusion is complete, your IV tube will be removed.  · Pressure may be applied to the IV site for a few minutes.  · A bandage (dressing) will be applied.  The procedure may vary among health care providers and hospitals.  What happens after the procedure?  · Your temperature, blood pressure, heart rate, breathing rate, and blood oxygen level will be monitored often.  · Your blood may be tested to see how you are responding to the transfusion.  · You may be warmed with fluids or blankets to maintain a normal body temperature.  Summary  · A blood transfusion is a procedure in which you receive donated blood, including plasma, platelets, and red blood cells, through an IV tube.  · Your temperature, blood pressure, and pulse will be monitored before, during, and after the transfusion.  · Your blood may be tested after the transfusion to see how your body has responded.  This information is not intended to replace advice given to you by your health care provider. Make sure you discuss any questions you have with your health care provider.  Document Released: 12/15/2001 Document Revised: 09/14/2017 Document Reviewed: 09/14/2017  [employer] Interactive Patient Education © 2019 [employer]

## 2019-12-19 NOTE — PATIENT INSTRUCTIONS
Blood Transfusion, Adult    A blood transfusion is a procedure in which you receive donated blood, including plasma, platelets, and red blood cells, through an IV tube. You may need a blood transfusion because of illness, surgery, or injury. The blood may come from a donor. You may also be able to donate blood for yourself (autologous blood donation) before a surgery if you know that you might require a blood transfusion.  The blood given in a transfusion is made up of different types of cells. You may receive:  · Red blood cells. These carry oxygen to the cells in the body.  · White blood cells. These help you fight infections.  · Platelets. These help your blood to clot.  · Plasma. This is the liquid part of your blood and it helps with fluid imbalances.  If you have hemophilia or another clotting disorder, you may also receive other types of blood products.  Tell a health care provider about:  · Any allergies you have.  · All medicines you are taking, including vitamins, herbs, eye drops, creams, and over-the-counter medicines.  · Any problems you or family members have had with anesthetic medicines.  · Any blood disorders you have.  · Any surgeries you have had.  · Any medical conditions you have, including any recent fever or cold symptoms.  · Whether you are pregnant or may be pregnant.  · Any previous reactions you have had during a blood transfusion.  What are the risks?  Generally, this is a safe procedure. However, problems may occur, including:  · Having an allergic reaction to something in the donated blood. Hives and itching may be symptoms of this type of reaction.  · Fever. This may be a reaction to the white blood cells in the transfused blood. Nausea or chest pain may accompany a fever.  · Iron overload. This can happen from having many transfusions.  · Transfusion-related acute lung injury (TRALI). This is a rare reaction that causes lung damage. The cause is not known. TRALI can occur within hours  of a transfusion or several days later.  · Sudden (acute) or delayed hemolytic reactions. This happens if your blood does not match the cells in your transfusion. Your body’s defense system (immune system) may try to attack the new cells. This complication is rare. The symptoms include fever, chills, nausea, and low back pain or chest pain.  · Infection or disease transmission. This is rare.  What happens before the procedure?  · You will have a blood test to determine your blood type. This is necessary to know what kind of blood your body will accept and to match it to the donor blood.  · If you are going to have a planned surgery, you may be able to do an autologous blood donation. This may be done in case you need to have a transfusion.  · If you have had an allergic reaction to a transfusion in the past, you may be given medicine to help prevent a reaction. This medicine may be given to you by mouth or through an IV tube.  · You will have your temperature, blood pressure, and pulse monitored before the transfusion.  · Follow instructions from your health care provider about eating and drinking restrictions.  · Ask your health care provider about:  ? Changing or stopping your regular medicines. This is especially important if you are taking diabetes medicines or blood thinners.  ? Taking medicines such as aspirin and ibuprofen. These medicines can thin your blood. Do not take these medicines before your procedure if your health care provider instructs you not to.  What happens during the procedure?  · An IV tube will be inserted into one of your veins.  · The bag of donated blood will be attached to your IV tube. The blood will then enter through your vein.  · Your temperature, blood pressure, and pulse will be monitored regularly during the transfusion. This monitoring is done to detect early signs of a transfusion reaction.  · If you have any signs or symptoms of a reaction, your transfusion will be stopped and  you may be given medicine.  · When the transfusion is complete, your IV tube will be removed.  · Pressure may be applied to the IV site for a few minutes.  · A bandage (dressing) will be applied.  The procedure may vary among health care providers and hospitals.  What happens after the procedure?  · Your temperature, blood pressure, heart rate, breathing rate, and blood oxygen level will be monitored often.  · Your blood may be tested to see how you are responding to the transfusion.  · You may be warmed with fluids or blankets to maintain a normal body temperature.  Summary  · A blood transfusion is a procedure in which you receive donated blood, including plasma, platelets, and red blood cells, through an IV tube.  · Your temperature, blood pressure, and pulse will be monitored before, during, and after the transfusion.  · Your blood may be tested after the transfusion to see how your body has responded.  This information is not intended to replace advice given to you by your health care provider. Make sure you discuss any questions you have with your health care provider.  Document Released: 12/15/2001 Document Revised: 09/14/2017 Document Reviewed: 09/14/2017  [employer] Interactive Patient Education © 2019 [employer]

## 2019-12-19 NOTE — OUTREACH NOTE
Sepsis Week 3 Survey      Responses   Facility patient discharged from?  [facility]   Does the patient have one of the following disease processes/diagnoses(primary or secondary)?  Sepsis   Week 3 attempt successful?  No   Unsuccessful attempts  Attempt 2          Honey Simeon RN

## 2019-12-28 PROBLEM — J18.9 PNEUMONIA OF LEFT UPPER LOBE DUE TO INFECTIOUS ORGANISM: Status: ACTIVE | Noted: 2019-01-01

## 2019-12-31 NOTE — OUTREACH NOTE
Prep Survey      Responses   Facility patient discharged from?  [facility]   Is patient eligible?  Yes   Discharge diagnosis  Sepsis 2/2 Bronchitis vs PNA   Does the patient have one of the following disease processes/diagnoses(primary or secondary)?  Sepsis   Does the patient have Home health ordered?  Yes   What is the Home health agency?   [facility]    Is there a DME ordered?  No   Prep survey completed?  Yes          Margaret Velásquez RN

## 2020-01-01 ENCOUNTER — LAB REQUISITION (OUTPATIENT)
Dept: LAB | Facility: HOSPITAL | Age: 85
End: 2020-01-01

## 2020-01-01 ENCOUNTER — HOSPITAL ENCOUNTER (OUTPATIENT)
Dept: INFUSION THERAPY | Facility: HOSPITAL | Age: 85
Discharge: HOME | End: 2020-01-11
Admitting: INTERNAL MEDICINE

## 2020-01-01 ENCOUNTER — APPOINTMENT (OUTPATIENT)
Dept: GENERAL RADIOLOGY | Facility: HOSPITAL | Age: 85
End: 2020-01-01

## 2020-01-01 ENCOUNTER — TRANSCRIBE ORDERS (OUTPATIENT)
Dept: ADMINISTRATIVE | Facility: HOSPITAL | Age: 85
End: 2020-01-01

## 2020-01-01 ENCOUNTER — HOSPITAL ENCOUNTER (OUTPATIENT)
Dept: INFUSION THERAPY | Facility: HOSPITAL | Age: 85
Discharge: HOME | End: 2020-01-20
Admitting: INTERNAL MEDICINE

## 2020-01-01 ENCOUNTER — READMISSION MANAGEMENT (OUTPATIENT)
Dept: CALL CENTER | Facility: HOSPITAL | Age: 85
End: 2020-01-01

## 2020-01-01 ENCOUNTER — TRANSCRIBE ORDERS (OUTPATIENT)
Dept: INFUSION THERAPY | Facility: HOSPITAL | Age: 85
End: 2020-01-01

## 2020-01-01 ENCOUNTER — TRANSCRIBE ORDERS (OUTPATIENT)
Dept: TELEMETRY | Facility: HOSPITAL | Age: 85
End: 2020-01-01

## 2020-01-01 ENCOUNTER — HOSPITAL ENCOUNTER (OUTPATIENT)
Dept: INFUSION THERAPY | Facility: HOSPITAL | Age: 85
Discharge: HOME | End: 2020-01-03
Admitting: INTERNAL MEDICINE

## 2020-01-01 ENCOUNTER — APPOINTMENT (OUTPATIENT)
Dept: ONCOLOGY | Facility: HOSPITAL | Age: 85
End: 2020-01-01

## 2020-01-01 ENCOUNTER — APPOINTMENT (OUTPATIENT)
Dept: LAB | Facility: HOSPITAL | Age: 85
End: 2020-01-01

## 2020-01-01 ENCOUNTER — HOSPITAL ENCOUNTER (OUTPATIENT)
Dept: INFUSION THERAPY | Facility: HOSPITAL | Age: 85
Discharge: HOME | End: 2020-01-16
Admitting: INTERNAL MEDICINE

## 2020-01-01 ENCOUNTER — HOSPITAL ENCOUNTER (EMERGENCY)
Facility: HOSPITAL | Age: 85
End: 2020-01-23
Attending: EMERGENCY MEDICINE | Admitting: EMERGENCY MEDICINE

## 2020-01-01 VITALS
HEART RATE: 96 BPM | TEMPERATURE: 97.8 F | RESPIRATION RATE: 18 BRPM | DIASTOLIC BLOOD PRESSURE: 58 MMHG | SYSTOLIC BLOOD PRESSURE: 114 MMHG

## 2020-01-01 VITALS
HEART RATE: 100 BPM | TEMPERATURE: 98 F | SYSTOLIC BLOOD PRESSURE: 121 MMHG | RESPIRATION RATE: 18 BRPM | DIASTOLIC BLOOD PRESSURE: 56 MMHG

## 2020-01-01 VITALS
OXYGEN SATURATION: 97 % | TEMPERATURE: 98.6 F | SYSTOLIC BLOOD PRESSURE: 122 MMHG | DIASTOLIC BLOOD PRESSURE: 57 MMHG | RESPIRATION RATE: 18 BRPM | HEART RATE: 93 BPM

## 2020-01-01 VITALS
SYSTOLIC BLOOD PRESSURE: 117 MMHG | TEMPERATURE: 98.5 F | RESPIRATION RATE: 18 BRPM | DIASTOLIC BLOOD PRESSURE: 55 MMHG | HEART RATE: 89 BPM

## 2020-01-01 DIAGNOSIS — D46.9 MYELODYSPLASTIC SYNDROME, UNSPECIFIED (HCC): ICD-10-CM

## 2020-01-01 DIAGNOSIS — D64.9 ANEMIA, UNSPECIFIED: ICD-10-CM

## 2020-01-01 DIAGNOSIS — R65.21 SEPSIS WITH ACUTE RESPIRATORY FAILURE AND SEPTIC SHOCK, DUE TO UNSPECIFIED ORGANISM, UNSPECIFIED WHETHER HYPOXIA OR HYPERCAPNIA PRESENT (HCC): ICD-10-CM

## 2020-01-01 DIAGNOSIS — C95.90 LEUKEMIA NOT HAVING ACHIEVED REMISSION, UNSPECIFIED LEUKEMIA TYPE (HCC): ICD-10-CM

## 2020-01-01 DIAGNOSIS — I10 ESSENTIAL (PRIMARY) HYPERTENSION: ICD-10-CM

## 2020-01-01 DIAGNOSIS — D64.9 ANEMIA, UNSPECIFIED TYPE: Primary | ICD-10-CM

## 2020-01-01 DIAGNOSIS — Z86.2 HISTORY OF PANCYTOPENIA: ICD-10-CM

## 2020-01-01 DIAGNOSIS — M62.81 MUSCLE WEAKNESS (GENERALIZED): ICD-10-CM

## 2020-01-01 DIAGNOSIS — J18.9 PNEUMONIA, UNSPECIFIED ORGANISM: ICD-10-CM

## 2020-01-01 DIAGNOSIS — J96.00 SEPSIS WITH ACUTE RESPIRATORY FAILURE AND SEPTIC SHOCK, DUE TO UNSPECIFIED ORGANISM, UNSPECIFIED WHETHER HYPOXIA OR HYPERCAPNIA PRESENT (HCC): ICD-10-CM

## 2020-01-01 DIAGNOSIS — D47.3 THROMBOCYTHEMIA, ESSENTIAL (HCC): ICD-10-CM

## 2020-01-01 DIAGNOSIS — C92.00 ACUTE MYELOID LEUKEMIA IN ADULT (HCC): ICD-10-CM

## 2020-01-01 DIAGNOSIS — A41.9 SEPSIS WITH ACUTE RESPIRATORY FAILURE AND SEPTIC SHOCK, DUE TO UNSPECIFIED ORGANISM, UNSPECIFIED WHETHER HYPOXIA OR HYPERCAPNIA PRESENT (HCC): ICD-10-CM

## 2020-01-01 DIAGNOSIS — D69.6 THROMBOCYTOPENIA (HCC): ICD-10-CM

## 2020-01-01 DIAGNOSIS — D69.6 THROMBOCYTOPENIA (HCC): Primary | ICD-10-CM

## 2020-01-01 DIAGNOSIS — I46.9 CARDIAC ARREST (HCC): Primary | ICD-10-CM

## 2020-01-01 LAB
ABO + RH BLD: NORMAL
ABO GROUP BLD: NORMAL
ANISOCYTOSIS BLD QL: ABNORMAL
BACTERIA SPEC AEROBE CULT: NORMAL
BACTERIA SPEC AEROBE CULT: NORMAL
BH BB BLOOD EXPIRATION DATE: NORMAL
BH BB BLOOD TYPE BARCODE: 5100
BH BB BLOOD TYPE BARCODE: 6200
BH BB DISPENSE STATUS: NORMAL
BH BB PRODUCT CODE: NORMAL
BH BB UNIT NUMBER: NORMAL
BLASTS NFR BLD MANUAL: 1 % (ref 0–0)
BLASTS NFR BLD MANUAL: 1 % (ref 0–0)
BLASTS NFR BLD MANUAL: 2 % (ref 0–0)
BLASTS NFR BLD MANUAL: 2 % (ref 0–0)
BLASTS NFR BLD MANUAL: 4 % (ref 0–0)
BLD GP AB SCN SERPL QL: NEGATIVE
DACRYOCYTES BLD QL SMEAR: ABNORMAL
DEPRECATED RDW RBC AUTO: 51.6 FL (ref 37–54)
DEPRECATED RDW RBC AUTO: 52 FL (ref 37–54)
DEPRECATED RDW RBC AUTO: 52.1 FL (ref 37–54)
DEPRECATED RDW RBC AUTO: 52.4 FL (ref 37–54)
DEPRECATED RDW RBC AUTO: 52.5 FL (ref 37–54)
DEPRECATED RDW RBC AUTO: 52.8 FL (ref 37–54)
DEPRECATED RDW RBC AUTO: 53.1 FL (ref 37–54)
DEPRECATED RDW RBC AUTO: 53.1 FL (ref 37–54)
DEPRECATED RDW RBC AUTO: 53.4 FL (ref 37–54)
DEPRECATED RDW RBC AUTO: 53.7 FL (ref 37–54)
DEPRECATED RDW RBC AUTO: 53.7 FL (ref 37–54)
DEPRECATED RDW RBC AUTO: 53.8 FL (ref 37–54)
DEPRECATED RDW RBC AUTO: 53.8 FL (ref 37–54)
DEPRECATED RDW RBC AUTO: 54.1 FL (ref 37–54)
DEPRECATED RDW RBC AUTO: 54.6 FL (ref 37–54)
DEPRECATED RDW RBC AUTO: 55 FL (ref 37–54)
DEPRECATED RDW RBC AUTO: 55.1 FL (ref 37–54)
EOSINOPHIL # BLD MANUAL: 0.01 10*3/MM3 (ref 0–0.4)
EOSINOPHIL # BLD MANUAL: 0.02 10*3/MM3 (ref 0–0.4)
EOSINOPHIL NFR BLD MANUAL: 1 % (ref 0.3–6.2)
EOSINOPHIL NFR BLD MANUAL: 1 % (ref 0.3–6.2)
ERYTHROCYTE [DISTWIDTH] IN BLOOD BY AUTOMATED COUNT: 15.7 % (ref 12.3–15.4)
ERYTHROCYTE [DISTWIDTH] IN BLOOD BY AUTOMATED COUNT: 16 % (ref 12.3–15.4)
ERYTHROCYTE [DISTWIDTH] IN BLOOD BY AUTOMATED COUNT: 16.1 % (ref 12.3–15.4)
ERYTHROCYTE [DISTWIDTH] IN BLOOD BY AUTOMATED COUNT: 16.1 % (ref 12.3–15.4)
ERYTHROCYTE [DISTWIDTH] IN BLOOD BY AUTOMATED COUNT: 16.2 % (ref 12.3–15.4)
ERYTHROCYTE [DISTWIDTH] IN BLOOD BY AUTOMATED COUNT: 16.3 % (ref 12.3–15.4)
ERYTHROCYTE [DISTWIDTH] IN BLOOD BY AUTOMATED COUNT: 16.4 % (ref 12.3–15.4)
ERYTHROCYTE [DISTWIDTH] IN BLOOD BY AUTOMATED COUNT: 16.5 % (ref 12.3–15.4)
ERYTHROCYTE [DISTWIDTH] IN BLOOD BY AUTOMATED COUNT: 16.6 % (ref 12.3–15.4)
ERYTHROCYTE [DISTWIDTH] IN BLOOD BY AUTOMATED COUNT: 16.6 % (ref 12.3–15.4)
ERYTHROCYTE [DISTWIDTH] IN BLOOD BY AUTOMATED COUNT: 16.8 % (ref 12.3–15.4)
ERYTHROCYTE [DISTWIDTH] IN BLOOD BY AUTOMATED COUNT: 16.9 % (ref 12.3–15.4)
GLUCOSE BLDC GLUCOMTR-MCNC: 376 MG/DL (ref 70–130)
HCT VFR BLD AUTO: 20.5 % (ref 34–46.6)
HCT VFR BLD AUTO: 21.5 % (ref 34–46.6)
HCT VFR BLD AUTO: 21.7 % (ref 34–46.6)
HCT VFR BLD AUTO: 22.7 % (ref 34–46.6)
HCT VFR BLD AUTO: 22.9 % (ref 34–46.6)
HCT VFR BLD AUTO: 23.2 % (ref 34–46.6)
HCT VFR BLD AUTO: 23.2 % (ref 34–46.6)
HCT VFR BLD AUTO: 23.8 % (ref 34–46.6)
HCT VFR BLD AUTO: 24.5 % (ref 34–46.6)
HCT VFR BLD AUTO: 25.5 % (ref 34–46.6)
HCT VFR BLD AUTO: 25.9 % (ref 34–46.6)
HCT VFR BLD AUTO: 25.9 % (ref 34–46.6)
HCT VFR BLD AUTO: 28 % (ref 34–46.6)
HCT VFR BLD AUTO: 28.3 % (ref 34–46.6)
HGB BLD-MCNC: 6.3 G/DL (ref 12–15.9)
HGB BLD-MCNC: 6.8 G/DL (ref 12–15.9)
HGB BLD-MCNC: 6.9 G/DL (ref 12–15.9)
HGB BLD-MCNC: 7 G/DL (ref 12–15.9)
HGB BLD-MCNC: 7.1 G/DL (ref 12–15.9)
HGB BLD-MCNC: 7.1 G/DL (ref 12–15.9)
HGB BLD-MCNC: 7.2 G/DL (ref 12–15.9)
HGB BLD-MCNC: 7.2 G/DL (ref 12–15.9)
HGB BLD-MCNC: 7.3 G/DL (ref 12–15.9)
HGB BLD-MCNC: 7.4 G/DL (ref 12–15.9)
HGB BLD-MCNC: 7.6 G/DL (ref 12–15.9)
HGB BLD-MCNC: 7.6 G/DL (ref 12–15.9)
HGB BLD-MCNC: 7.7 G/DL (ref 12–15.9)
HGB BLD-MCNC: 8.1 G/DL (ref 12–15.9)
HGB BLD-MCNC: 8.4 G/DL (ref 12–15.9)
HGB BLD-MCNC: 8.5 G/DL (ref 12–15.9)
HGB BLD-MCNC: 8.8 G/DL (ref 12–15.9)
HGB BLD-MCNC: 9.3 G/DL (ref 12–15.9)
HYPOCHROMIA BLD QL: ABNORMAL
LYMPHOCYTES # BLD MANUAL: 0.47 10*3/MM3 (ref 0.7–3.1)
LYMPHOCYTES # BLD MANUAL: 0.55 10*3/MM3 (ref 0.7–3.1)
LYMPHOCYTES # BLD MANUAL: 0.73 10*3/MM3 (ref 0.7–3.1)
LYMPHOCYTES # BLD MANUAL: 0.74 10*3/MM3 (ref 0.7–3.1)
LYMPHOCYTES # BLD MANUAL: 0.92 10*3/MM3 (ref 0.7–3.1)
LYMPHOCYTES # BLD MANUAL: 1.04 10*3/MM3 (ref 0.7–3.1)
LYMPHOCYTES # BLD MANUAL: 1.13 10*3/MM3 (ref 0.7–3.1)
LYMPHOCYTES # BLD MANUAL: 1.2 10*3/MM3 (ref 0.7–3.1)
LYMPHOCYTES # BLD MANUAL: 1.23 10*3/MM3 (ref 0.7–3.1)
LYMPHOCYTES # BLD MANUAL: 1.3 10*3/MM3 (ref 0.7–3.1)
LYMPHOCYTES # BLD MANUAL: 1.34 10*3/MM3 (ref 0.7–3.1)
LYMPHOCYTES # BLD MANUAL: 1.37 10*3/MM3 (ref 0.7–3.1)
LYMPHOCYTES # BLD MANUAL: 1.49 10*3/MM3 (ref 0.7–3.1)
LYMPHOCYTES # BLD MANUAL: 1.5 10*3/MM3 (ref 0.7–3.1)
LYMPHOCYTES # BLD MANUAL: 1.61 10*3/MM3 (ref 0.7–3.1)
LYMPHOCYTES # BLD MANUAL: 1.62 10*3/MM3 (ref 0.7–3.1)
LYMPHOCYTES NFR BLD MANUAL: 1 % (ref 5–12)
LYMPHOCYTES NFR BLD MANUAL: 1 % (ref 5–12)
LYMPHOCYTES NFR BLD MANUAL: 100 % (ref 19.6–45.3)
LYMPHOCYTES NFR BLD MANUAL: 100 % (ref 19.6–45.3)
LYMPHOCYTES NFR BLD MANUAL: 2 % (ref 5–12)
LYMPHOCYTES NFR BLD MANUAL: 3 % (ref 5–12)
LYMPHOCYTES NFR BLD MANUAL: 4 % (ref 5–12)
LYMPHOCYTES NFR BLD MANUAL: 5 % (ref 5–12)
LYMPHOCYTES NFR BLD MANUAL: 8 % (ref 5–12)
LYMPHOCYTES NFR BLD MANUAL: 80 % (ref 19.6–45.3)
LYMPHOCYTES NFR BLD MANUAL: 84 % (ref 19.6–45.3)
LYMPHOCYTES NFR BLD MANUAL: 88 % (ref 19.6–45.3)
LYMPHOCYTES NFR BLD MANUAL: 91 % (ref 19.6–45.3)
LYMPHOCYTES NFR BLD MANUAL: 96 % (ref 19.6–45.3)
LYMPHOCYTES NFR BLD MANUAL: 97 % (ref 19.6–45.3)
LYMPHOCYTES NFR BLD MANUAL: 97 % (ref 19.6–45.3)
LYMPHOCYTES NFR BLD MANUAL: 98 % (ref 19.6–45.3)
MACROCYTES BLD QL SMEAR: ABNORMAL
MACROCYTES BLD QL SMEAR: ABNORMAL
MCH RBC QN AUTO: 29.2 PG (ref 26.6–33)
MCH RBC QN AUTO: 29.3 PG (ref 26.6–33)
MCH RBC QN AUTO: 29.4 PG (ref 26.6–33)
MCH RBC QN AUTO: 29.4 PG (ref 26.6–33)
MCH RBC QN AUTO: 29.7 PG (ref 26.6–33)
MCH RBC QN AUTO: 29.8 PG (ref 26.6–33)
MCH RBC QN AUTO: 29.8 PG (ref 26.6–33)
MCH RBC QN AUTO: 30 PG (ref 26.6–33)
MCH RBC QN AUTO: 30.1 PG (ref 26.6–33)
MCH RBC QN AUTO: 30.2 PG (ref 26.6–33)
MCH RBC QN AUTO: 30.3 PG (ref 26.6–33)
MCH RBC QN AUTO: 30.3 PG (ref 26.6–33)
MCH RBC QN AUTO: 30.4 PG (ref 26.6–33)
MCH RBC QN AUTO: 30.6 PG (ref 26.6–33)
MCH RBC QN AUTO: 30.8 PG (ref 26.6–33)
MCHC RBC AUTO-ENTMCNC: 30.7 G/DL (ref 31.5–35.7)
MCHC RBC AUTO-ENTMCNC: 31.4 G/DL (ref 31.5–35.7)
MCHC RBC AUTO-ENTMCNC: 31.4 G/DL (ref 31.5–35.7)
MCHC RBC AUTO-ENTMCNC: 31.6 G/DL (ref 31.5–35.7)
MCHC RBC AUTO-ENTMCNC: 31.7 G/DL (ref 31.5–35.7)
MCHC RBC AUTO-ENTMCNC: 31.8 G/DL (ref 31.5–35.7)
MCHC RBC AUTO-ENTMCNC: 31.8 G/DL (ref 31.5–35.7)
MCHC RBC AUTO-ENTMCNC: 31.9 G/DL (ref 31.5–35.7)
MCHC RBC AUTO-ENTMCNC: 32.4 G/DL (ref 31.5–35.7)
MCHC RBC AUTO-ENTMCNC: 32.7 G/DL (ref 31.5–35.7)
MCHC RBC AUTO-ENTMCNC: 32.8 G/DL (ref 31.5–35.7)
MCHC RBC AUTO-ENTMCNC: 32.8 G/DL (ref 31.5–35.7)
MCHC RBC AUTO-ENTMCNC: 32.9 G/DL (ref 31.5–35.7)
MCHC RBC AUTO-ENTMCNC: 33 G/DL (ref 31.5–35.7)
MCHC RBC AUTO-ENTMCNC: 33.2 G/DL (ref 31.5–35.7)
MCV RBC AUTO: 90.6 FL (ref 79–97)
MCV RBC AUTO: 91.1 FL (ref 79–97)
MCV RBC AUTO: 92.3 FL (ref 79–97)
MCV RBC AUTO: 92.5 FL (ref 79–97)
MCV RBC AUTO: 92.5 FL (ref 79–97)
MCV RBC AUTO: 92.7 FL (ref 79–97)
MCV RBC AUTO: 92.7 FL (ref 79–97)
MCV RBC AUTO: 93 FL (ref 79–97)
MCV RBC AUTO: 93.2 FL (ref 79–97)
MCV RBC AUTO: 93.3 FL (ref 79–97)
MCV RBC AUTO: 93.5 FL (ref 79–97)
MCV RBC AUTO: 93.5 FL (ref 79–97)
MCV RBC AUTO: 93.8 FL (ref 79–97)
MCV RBC AUTO: 93.8 FL (ref 79–97)
MCV RBC AUTO: 94.6 FL (ref 79–97)
MCV RBC AUTO: 95.1 FL (ref 79–97)
MCV RBC AUTO: 95.8 FL (ref 79–97)
MONOCYTES # BLD AUTO: 0.01 10*3/MM3 (ref 0.1–0.9)
MONOCYTES # BLD AUTO: 0.02 10*3/MM3 (ref 0.1–0.9)
MONOCYTES # BLD AUTO: 0.03 10*3/MM3 (ref 0.1–0.9)
MONOCYTES # BLD AUTO: 0.03 10*3/MM3 (ref 0.1–0.9)
MONOCYTES # BLD AUTO: 0.04 10*3/MM3 (ref 0.1–0.9)
MONOCYTES # BLD AUTO: 0.05 10*3/MM3 (ref 0.1–0.9)
NEUTROPHILS # BLD AUTO: 0 10*3/MM3 (ref 1.7–7)
NEUTROPHILS # BLD AUTO: 0.01 10*3/MM3 (ref 1.7–7)
NEUTROPHILS # BLD AUTO: 0.01 10*3/MM3 (ref 1.7–7)
NEUTROPHILS # BLD AUTO: 0.02 10*3/MM3 (ref 1.7–7)
NEUTROPHILS # BLD AUTO: 0.03 10*3/MM3 (ref 1.7–7)
NEUTROPHILS # BLD AUTO: 0.06 10*3/MM3 (ref 1.7–7)
NEUTROPHILS # BLD AUTO: 0.06 10*3/MM3 (ref 1.7–7)
NEUTROPHILS # BLD AUTO: 0.07 10*3/MM3 (ref 1.7–7)
NEUTROPHILS # BLD AUTO: 0.08 10*3/MM3 (ref 1.7–7)
NEUTROPHILS NFR BLD MANUAL: 0 % (ref 42.7–76)
NEUTROPHILS NFR BLD MANUAL: 1 % (ref 42.7–76)
NEUTROPHILS NFR BLD MANUAL: 12 % (ref 42.7–76)
NEUTROPHILS NFR BLD MANUAL: 12 % (ref 42.7–76)
NEUTROPHILS NFR BLD MANUAL: 2 % (ref 42.7–76)
NEUTROPHILS NFR BLD MANUAL: 5 % (ref 42.7–76)
NEUTROPHILS NFR BLD MANUAL: 7 % (ref 42.7–76)
NRBC SPEC MANUAL: 1 /100 WBC (ref 0–0.2)
OVALOCYTES BLD QL SMEAR: ABNORMAL
PLATELET # BLD AUTO: 15 10*3/MM3 (ref 140–450)
PLATELET # BLD AUTO: 16 10*3/MM3 (ref 140–450)
PLATELET # BLD AUTO: 21 10*3/MM3 (ref 140–450)
PLATELET # BLD AUTO: 24 10*3/MM3 (ref 140–450)
PLATELET # BLD AUTO: 31 10*3/MM3 (ref 140–450)
PLATELET # BLD AUTO: 34 10*3/MM3 (ref 140–450)
PLATELET # BLD AUTO: 34 10*3/MM3 (ref 140–450)
PLATELET # BLD AUTO: 37 10*3/MM3 (ref 140–450)
PLATELET # BLD AUTO: 41 10*3/MM3 (ref 140–450)
PLATELET # BLD AUTO: 43 10*3/MM3 (ref 140–450)
PLATELET # BLD AUTO: 44 10*3/MM3 (ref 140–450)
PLATELET # BLD AUTO: 48 10*3/MM3 (ref 140–450)
PLATELET # BLD AUTO: 49 10*3/MM3 (ref 140–450)
PLATELET # BLD AUTO: 51 10*3/MM3 (ref 140–450)
PLATELET # BLD AUTO: 53 10*3/MM3 (ref 140–450)
PLATELET # BLD AUTO: 58 10*3/MM3 (ref 140–450)
PLATELET # BLD AUTO: 60 10*3/MM3 (ref 140–450)
PLATELET # BLD AUTO: 63 10*3/MM3 (ref 140–450)
PLATELET # BLD AUTO: 77 10*3/MM3 (ref 140–450)
PMV BLD AUTO: 10.7 FL (ref 6–12)
PMV BLD AUTO: 11.3 FL (ref 6–12)
PMV BLD AUTO: 11.5 FL (ref 6–12)
PMV BLD AUTO: 11.6 FL (ref 6–12)
PMV BLD AUTO: 11.8 FL (ref 6–12)
PMV BLD AUTO: 11.8 FL (ref 6–12)
PMV BLD AUTO: 11.9 FL (ref 6–12)
PMV BLD AUTO: 12.1 FL (ref 6–12)
PMV BLD AUTO: 12.2 FL (ref 6–12)
PMV BLD AUTO: 12.3 FL (ref 6–12)
PMV BLD AUTO: 12.4 FL (ref 6–12)
PMV BLD AUTO: 12.6 FL (ref 6–12)
PMV BLD AUTO: 12.7 FL (ref 6–12)
PMV BLD AUTO: ABNORMAL FL
POIKILOCYTOSIS BLD QL SMEAR: ABNORMAL
POIKILOCYTOSIS BLD QL SMEAR: ABNORMAL
RBC # BLD AUTO: 2.14 10*6/MM3 (ref 3.77–5.28)
RBC # BLD AUTO: 2.32 10*6/MM3 (ref 3.77–5.28)
RBC # BLD AUTO: 2.33 10*6/MM3 (ref 3.77–5.28)
RBC # BLD AUTO: 2.34 10*6/MM3 (ref 3.77–5.28)
RBC # BLD AUTO: 2.34 10*6/MM3 (ref 3.77–5.28)
RBC # BLD AUTO: 2.36 10*6/MM3 (ref 3.77–5.28)
RBC # BLD AUTO: 2.42 10*6/MM3 (ref 3.77–5.28)
RBC # BLD AUTO: 2.42 10*6/MM3 (ref 3.77–5.28)
RBC # BLD AUTO: 2.44 10*6/MM3 (ref 3.77–5.28)
RBC # BLD AUTO: 2.56 10*6/MM3 (ref 3.77–5.28)
RBC # BLD AUTO: 2.56 10*6/MM3 (ref 3.77–5.28)
RBC # BLD AUTO: 2.62 10*6/MM3 (ref 3.77–5.28)
RBC # BLD AUTO: 2.72 10*6/MM3 (ref 3.77–5.28)
RBC # BLD AUTO: 2.78 10*6/MM3 (ref 3.77–5.28)
RBC # BLD AUTO: 2.8 10*6/MM3 (ref 3.77–5.28)
RBC # BLD AUTO: 3 10*6/MM3 (ref 3.77–5.28)
RBC # BLD AUTO: 3.06 10*6/MM3 (ref 3.77–5.28)
RH BLD: POSITIVE
SCAN SLIDE: NORMAL
SCHISTOCYTES BLD QL SMEAR: ABNORMAL
SCHISTOCYTES BLD QL SMEAR: ABNORMAL
SMALL PLATELETS BLD QL SMEAR: ABNORMAL
T&S EXPIRATION DATE: NORMAL
UNIT  ABO: NORMAL
UNIT  RH: NORMAL
WBC NRBC COR # BLD: 0.59 10*3/MM3 (ref 3.4–10.8)
WBC NRBC COR # BLD: 0.65 10*3/MM3 (ref 3.4–10.8)
WBC NRBC COR # BLD: 0.65 10*3/MM3 (ref 3.4–10.8)
WBC NRBC COR # BLD: 0.77 10*3/MM3 (ref 3.4–10.8)
WBC NRBC COR # BLD: 0.83 10*3/MM3 (ref 3.4–10.8)
WBC NRBC COR # BLD: 0.96 10*3/MM3 (ref 3.4–10.8)
WBC NRBC COR # BLD: 1.14 10*3/MM3 (ref 3.4–10.8)
WBC NRBC COR # BLD: 1.18 10*3/MM3 (ref 3.4–10.8)
WBC NRBC COR # BLD: 1.22 10*3/MM3 (ref 3.4–10.8)
WBC NRBC COR # BLD: 1.28 10*3/MM3 (ref 3.4–10.8)
WBC NRBC COR # BLD: 1.33 10*3/MM3 (ref 3.4–10.8)
WBC NRBC COR # BLD: 1.37 10*3/MM3 (ref 3.4–10.8)
WBC NRBC COR # BLD: 1.37 10*3/MM3 (ref 3.4–10.8)
WBC NRBC COR # BLD: 1.54 10*3/MM3 (ref 3.4–10.8)
WBC NRBC COR # BLD: 1.55 10*3/MM3 (ref 3.4–10.8)
WBC NRBC COR # BLD: 1.62 10*3/MM3 (ref 3.4–10.8)
WBC NRBC COR # BLD: 1.64 10*3/MM3 (ref 3.4–10.8)

## 2020-01-01 PROCEDURE — A9270 NON-COVERED ITEM OR SERVICE: HCPCS | Performed by: INTERNAL MEDICINE

## 2020-01-01 PROCEDURE — 85025 COMPLETE CBC W/AUTO DIFF WBC: CPT | Performed by: INTERNAL MEDICINE

## 2020-01-01 PROCEDURE — 63710000001 ACETAMINOPHEN 325 MG TABLET: Performed by: INTERNAL MEDICINE

## 2020-01-01 PROCEDURE — P9037 PLATE PHERES LEUKOREDU IRRAD: HCPCS

## 2020-01-01 PROCEDURE — P9040 RBC LEUKOREDUCED IRRADIATED: HCPCS

## 2020-01-01 PROCEDURE — 86900 BLOOD TYPING SEROLOGIC ABO: CPT | Performed by: INTERNAL MEDICINE

## 2020-01-01 PROCEDURE — 82962 GLUCOSE BLOOD TEST: CPT

## 2020-01-01 PROCEDURE — 86923 COMPATIBILITY TEST ELECTRIC: CPT

## 2020-01-01 PROCEDURE — 51702 INSERT TEMP BLADDER CATH: CPT

## 2020-01-01 PROCEDURE — 86850 RBC ANTIBODY SCREEN: CPT | Performed by: INTERNAL MEDICINE

## 2020-01-01 PROCEDURE — 93005 ELECTROCARDIOGRAM TRACING: CPT | Performed by: FAMILY MEDICINE

## 2020-01-01 PROCEDURE — 85007 BL SMEAR W/DIFF WBC COUNT: CPT | Performed by: INTERNAL MEDICINE

## 2020-01-01 PROCEDURE — 25010000002 EPINEPHRINE PF 1 MG/10ML SOLUTION PREFILLED SYRINGE: Performed by: EMERGENCY MEDICINE

## 2020-01-01 PROCEDURE — 93005 ELECTROCARDIOGRAM TRACING: CPT | Performed by: EMERGENCY MEDICINE

## 2020-01-01 PROCEDURE — 86900 BLOOD TYPING SEROLOGIC ABO: CPT

## 2020-01-01 PROCEDURE — 99291 CRITICAL CARE FIRST HOUR: CPT

## 2020-01-01 PROCEDURE — 93010 ELECTROCARDIOGRAM REPORT: CPT | Performed by: INTERNAL MEDICINE

## 2020-01-01 PROCEDURE — 36430 TRANSFUSION BLD/BLD COMPNT: CPT

## 2020-01-01 PROCEDURE — 85018 HEMOGLOBIN: CPT | Performed by: INTERNAL MEDICINE

## 2020-01-01 PROCEDURE — 25010000002 ATROPINE PER 0.01 MG: Performed by: EMERGENCY MEDICINE

## 2020-01-01 PROCEDURE — 92950 HEART/LUNG RESUSCITATION CPR: CPT

## 2020-01-01 PROCEDURE — 87040 BLOOD CULTURE FOR BACTERIA: CPT | Performed by: INTERNAL MEDICINE

## 2020-01-01 PROCEDURE — 63710000001 DIPHENHYDRAMINE PER 50 MG: Performed by: INTERNAL MEDICINE

## 2020-01-01 PROCEDURE — 86901 BLOOD TYPING SEROLOGIC RH(D): CPT | Performed by: INTERNAL MEDICINE

## 2020-01-01 PROCEDURE — 85014 HEMATOCRIT: CPT | Performed by: INTERNAL MEDICINE

## 2020-01-01 PROCEDURE — 36415 COLL VENOUS BLD VENIPUNCTURE: CPT

## 2020-01-01 PROCEDURE — 31500 INSERT EMERGENCY AIRWAY: CPT

## 2020-01-01 PROCEDURE — 85049 AUTOMATED PLATELET COUNT: CPT | Performed by: INTERNAL MEDICINE

## 2020-01-01 PROCEDURE — 85027 COMPLETE CBC AUTOMATED: CPT | Performed by: INTERNAL MEDICINE

## 2020-01-01 PROCEDURE — 99285 EMERGENCY DEPT VISIT HI MDM: CPT

## 2020-01-01 PROCEDURE — 99284 EMERGENCY DEPT VISIT MOD MDM: CPT

## 2020-01-01 RX ORDER — ACETAMINOPHEN 325 MG/1
650 TABLET ORAL ONCE
Status: COMPLETED | OUTPATIENT
Start: 2020-01-01 | End: 2020-01-01

## 2020-01-01 RX ORDER — SODIUM CHLORIDE 9 MG/ML
INJECTION, SOLUTION INTRAVENOUS
Status: COMPLETED | OUTPATIENT
Start: 2020-01-01 | End: 2020-01-01

## 2020-01-01 RX ORDER — DIPHENHYDRAMINE HCL 12.5MG/5ML
12.5 LIQUID (ML) ORAL ONCE
Status: COMPLETED | OUTPATIENT
Start: 2020-01-01 | End: 2020-01-01

## 2020-01-01 RX ORDER — SODIUM CHLORIDE 0.9 % (FLUSH) 0.9 %
10 SYRINGE (ML) INJECTION AS NEEDED
Status: DISCONTINUED | OUTPATIENT
Start: 2020-01-01 | End: 2020-01-01 | Stop reason: HOSPADM

## 2020-01-01 RX ADMIN — ACETAMINOPHEN 650 MG: 325 TABLET ORAL at 10:52

## 2020-01-01 RX ADMIN — ATROPINE SULFATE 1 MG: 1 INJECTION, SOLUTION INTRAMUSCULAR; INTRAVENOUS; SUBCUTANEOUS at 18:26

## 2020-01-01 RX ADMIN — SODIUM BICARBONATE 50 MEQ: 84 INJECTION, SOLUTION INTRAVENOUS at 17:59

## 2020-01-01 RX ADMIN — EPINEPHRINE 1 MG: 0.1 INJECTION, SOLUTION ENDOTRACHEAL; INTRACARDIAC; INTRAVENOUS at 17:59

## 2020-01-01 RX ADMIN — SODIUM CHLORIDE 1000 ML/HR: 9 INJECTION, SOLUTION INTRAVENOUS at 17:56

## 2020-01-01 RX ADMIN — EPINEPHRINE 1 MG: 0.1 INJECTION, SOLUTION ENDOTRACHEAL; INTRACARDIAC; INTRAVENOUS at 17:56

## 2020-01-01 RX ADMIN — EPINEPHRINE 1 MG: 0.1 INJECTION, SOLUTION ENDOTRACHEAL; INTRACARDIAC; INTRAVENOUS at 17:49

## 2020-01-01 RX ADMIN — DIPHENHYDRAMINE HYDROCHLORIDE 12.5 MG: 12.5 SOLUTION ORAL at 10:52

## 2020-01-01 RX ADMIN — EPINEPHRINE 1 MG: 0.1 INJECTION, SOLUTION ENDOTRACHEAL; INTRACARDIAC; INTRAVENOUS at 17:53

## 2020-01-01 RX ADMIN — EPINEPHRINE 1 MG: 0.1 INJECTION, SOLUTION ENDOTRACHEAL; INTRACARDIAC; INTRAVENOUS at 18:28

## 2020-01-02 NOTE — OUTREACH NOTE
Sepsis Week 1 Survey      Responses   Facility patient discharged from?  [facility]   Does the patient have one of the following disease processes/diagnoses(primary or secondary)?  Sepsis   Is there a successful TCM telephone encounter documented?  No   Week 1 attempt successful?  Yes   Call start time  1215   Call end time  1221   Discharge diagnosis  Sepsis 2/2 Bronchitis vs PNA   Person spoke with today (if not patient) and relationship  Brigida-daughter   Meds reviewed with patient/caregiver?  Yes   Is the patient having any side effects they believe may be caused by any medication additions or changes?  No   Does the patient have all medications related to this admission filled (includes all antibiotics, inhalers, nebulizers,steroids,etc.)  Yes   Is the patient taking all medications as directed (includes completed medication regime)?  Yes   Does the patient have a primary care provider?   Yes   Does the patient have an appointment with their PCP within 7 days of discharge?  Greater than 7 days   Comments  Sees Dr. Kelly today   What is the Home health agency?   [facility]    Has home health visited the patient within 72 hours of discharge?  Yes   Home health comments  Daily lab work   Psychosocial issues?  No   Did the patient receive a copy of their discharge instructions?  Yes   Nursing interventions  Reviewed instructions with patient   What is the patient's perception of their health status since discharge?  Improving   Is the patient/caregiver able to teach back Sepsis?  S - Shivering,fever or very cold, P - Pale or discolored skin, S - Sleepy, difficult to arouse,confused, S - Short of breath   Nursing interventions  Nurse provided reassurance to patient   Is patient/caregiver able to teach back steps to recovery at home?  Set small, achievable goals for return to baseline health   Is the patient/caregiver able to teach back signs and symptoms of worsening condition:  Fever, Rapid heart rate (>90)   Is  the patient/caregiver able to teach back the hierarchy of who to call/visit for symptoms/problems? PCP, Specialist, Home health nurse, Urgent Care, ED, 911  Yes   Week 1 call completed?  Yes          Amira Mcbride RN

## 2020-01-03 NOTE — PATIENT INSTRUCTIONS
Platelet Transfusion  A platelet transfusion is a procedure in which you receive donated platelets through an IV. Platelets are tiny pieces of blood cells. When you get an injury, platelets clump together in the area to form a blood clot. This helps stop bleeding and is the beginning of the healing process. If you have too few platelets, your blood may have trouble clotting. This may cause you to bleed and bruise very easily.  You may need a platelet transfusion if you have a condition that causes a low number of platelets (thrombocytopenia). A platelet transfusion may be used to stop or prevent excessive bleeding.  Tell a health care provider about:  · Any reactions you have had during previous transfusions.  · Any allergies you have.  · All medicines you are taking, including vitamins, herbs, eye drops, creams, and over-the-counter medicines.  · Any blood disorders you have.  · Any surgeries you have had.  · Any medical conditions you have.  · Whether you are pregnant or may be pregnant.  What are the risks?  Generally, this is a safe procedure. However, problems may occur, including:  · Fever.  · Infection.  · Allergic reaction to the donor platelets.  · Your body's disease-fighting system (immune system) attacking the donor platelets (hemolytic reaction). This is rare.  · A rare reaction that causes lung damage (transfusion-related acute lung injury).  What happens before the procedure?  Medicines  · Ask your health care provider about:  ? Changing or stopping your regular medicines. This is especially important if you are taking diabetes medicines or blood thinners.  ? Taking medicines such as aspirin and ibuprofen. These medicines can thin your blood. Do not take these medicines unless your health care provider tells you to take them.  ? Taking over-the-counter medicines, vitamins, herbs, and supplements.  General instructions  · You will have a blood test to determine your blood type. Your blood type  determines what kind of platelets you will be given.  · Follow instructions from your health care provider about eating or drinking restrictions.  · If you have had an allergic reaction to a transfusion in the past, you may be given medicine to help prevent a reaction.  · Your temperature, blood pressure, pulse, and breathing will be monitored.  What happens during the procedure?    · An IV will be inserted into one of your veins.  · For your safety, two health care providers will verify your identity along with the donor platelets about to be infused.  · A bag of donor platelets will be connected to your IV. The platelets will flow into your bloodstream. This usually takes 30-60 minutes.  · Your temperature, blood pressure, pulse, and breathing will be monitored during the transfusion. This helps detect early signs of any reaction.  · You will also be monitored for other symptoms that may indicate a reaction, including chills, hives, or itching.  · If you have signs of a reaction at any time, your transfusion will be stopped, and you may be given medicine to help manage the reaction.  · When your transfusion is complete, your IV will be removed.  · Pressure may be applied to the IV site for a few minutes to stop any bleeding.  · The IV site will be covered with a bandage (dressing).  The procedure may vary among health care providers and hospitals.  What happens after the procedure?  · Your blood pressure, temperature, pulse, and breathing will be monitored until you leave the hospital or clinic.  · You may have some bruising and soreness at your IV site.  Follow these instructions at home:  Medicines  · Take over-the-counter and prescription medicines only as told by your health care provider.  · Talk with your health care provider before you take any medicines that contain aspirin or NSAIDs. These medicines increase your risk for dangerous bleeding.  General instructions  · Change or remove your dressing as told  by your health care provider.  · Return to your normal activities as told by your health care provider. Ask your health care provider what activities are safe for you.  · Do not take baths, swim, or use a hot tub until your health care provider approves. Ask your health care provider if you may take showers.  · Check your IV site every day for signs of infection. Check for:  ? Redness, swelling, or pain.  ? Fluid or blood. If fluid or blood drains from your IV site, use your hands to press down firmly on a bandage covering the area for a minute or two. Doing this should stop the bleeding.  ? Warmth.  ? Pus or a bad smell.  · Keep all follow-up visits as told by your health care provider. This is important.  Contact a health care provider if you have:  · A headache that does not go away with medicine.  · Hives, rash, or itchy skin.  · Nausea or vomiting.  · Unusual tiredness or weakness.  · Signs of infection at your IV site.  Get help right away if:  · You have a fever or chills.  · You urinate less often than usual.  · Your urine is darker colored than normal.  · You have any of the following:  ? Trouble breathing.  ? Pain in your back, abdomen, or chest.  ? Cool, clammy skin.  ? A fast heartbeat.  Summary  · Platelets are tiny pieces of blood cells that clump together to form a blood clot when you have an injury. If you have too few platelets, your blood may have trouble clotting.  · A platelet transfusion is a procedure in which you receive donated platelets through an IV.  · A platelet transfusion may be used to stop or prevent excessive bleeding.  · After the procedure, check your IV site every day for signs of infection, including redness, swelling, pain, or warmth.  This information is not intended to replace advice given to you by your health care provider. Make sure you discuss any questions you have with your health care provider.  Document Released: 10/14/2008 Document Revised: 01/23/2019 Document  Reviewed: 01/23/2019  [employer] Interactive Patient Education © 2019 [employer]

## 2020-01-10 NOTE — OUTREACH NOTE
Sepsis Week 2 Survey      Responses   Facility patient discharged from?  [facility]   Does the patient have one of the following disease processes/diagnoses(primary or secondary)?  Sepsis   Week 2 attempt successful?  Yes   Call start time  1254   Rescheduled  Rescheduled-pt requested [pt not available]   Discharge diagnosis  Sepsis 2/2 Bronchitis vs PNA          Jinny Plascencia, RN

## 2020-01-11 NOTE — NURSING NOTE
Spoke with Triston in blood bank, states the order needs to be modified to say IRRADIATED blood, as that is what Dr. Kelly told me to order at 1528. Changed order to say irradiated blood at this time.     Pt in the room eating dinner at this time, assisted pt with tray setup. Pt A&Ox4, will continue to monitor.

## 2020-01-11 NOTE — NURSING NOTE
Lab phlebotomist Talisha on floor at this time, attempting to draw STAT labs ordered, pt's bracelet is not scanning, cherelle Daly supervisor on the floor at this time speaking with Zara Cooley and JAGDISH.

## 2020-01-11 NOTE — NURSING NOTE
Malika, house supervisor in pt's room at this time. Speaking with daughter, Christina. Neutropenic precautions continued.

## 2020-01-11 NOTE — NURSING NOTE
Blood transfusion consent discussed with pt and daughter Christina, pt and daughter are agreeable to transfusion. Daughter Christina signed consent, witnessed by myself.    Patient in ultrasound at this time via [address] Maritza, RN  05/06/19 0001

## 2020-01-11 NOTE — NURSING NOTE
Spoke with dietary at this time, ordered pt a diabetic tray for dinner. Daughter Christina at bedside.

## 2020-01-11 NOTE — NURSING NOTE
Lab notified HANNAH Yeboah, HANNAH Yeboah states lab does not have any blood at this time. Dr. Kelly notified of blood shortage, he states to get STAT H&H and contact him with results to see if she needs to receive blood STAT or if it can wait until the morning.

## 2020-01-11 NOTE — NURSING NOTE
Spoke with Dr. Kelly on the the telephone at this time, verified orders of CBC, type and cross, verify blood consent, prepare one unit PRBC, and transfuse one unit of PRBC.     Pt is A&Ox4, daughter Christina is at bedside. Lung sounds clear and equal bilaterally, respirations even and unlabored, pt is pale in color, BP is 88/51, HR 94, temp 98.0 orally.     Notified Lab of STAT orders and pt current location of OBS unit, room 251A, and pt in neutropenic isolation, sign placed on door.

## 2020-01-11 NOTE — NURSING NOTE
Notified Dr. Kelly of CRITICAL WBC, HGB, HCT, and PLATELETS, states to wait on the irradiated blood but to order it STAT. Spoken with Meghan in blood bank at this time notified her of Dr. Kelly request, states she will order it AS SOON as we get off the phone.

## 2020-01-13 NOTE — OUTREACH NOTE
Sepsis Week 2 Survey      Responses   Facility patient discharged from?  [facility]   Does the patient have one of the following disease processes/diagnoses(primary or secondary)?  Sepsis   Week 2 attempt successful?  Yes   Call start time  0841   Call end time  0844   Discharge diagnosis  Sepsis 2/2 Bronchitis vs PNA   Person spoke with today (if not patient) and relationship  Brigida-daughter   Meds reviewed with patient/caregiver?  Yes   Is the patient taking all medications as directed (includes completed medication regime)?  Yes   Does the patient have a primary care provider?   Yes   Does the patient have an appointment with their PCP within 7 days of discharge?  Yes   Has the patient kept scheduled appointments due by today?  Yes   Comments  Starts Chemo 01/20/2020     Will make appt with PCP   What is the Home health agency?   [facility]    What is the patient's perception of their health status since discharge?  Improving   Is patient/caregiver able to teach back steps to recovery at home?  Eat a balanced diet   Is the patient/caregiver able to teach back signs and symptoms of worsening condition:  Fever, Rapid heart rate (>90), Shortness of breath/rapid respiratory rate, Altered mental status(confusion/coma)   Week 2 call completed?  Yes          Emilee Go RN

## 2020-01-24 VITALS
DIASTOLIC BLOOD PRESSURE: 29 MMHG | HEIGHT: 60 IN | OXYGEN SATURATION: 96 % | BODY MASS INDEX: 22.19 KG/M2 | SYSTOLIC BLOOD PRESSURE: 63 MMHG | TEMPERATURE: 98.2 F | WEIGHT: 113 LBS

## 2020-01-24 RX ORDER — ATROPINE SULFATE 1 MG/ML
INJECTION, SOLUTION INTRAMUSCULAR; INTRAVENOUS; SUBCUTANEOUS
Status: COMPLETED | OUTPATIENT
Start: 2020-01-01 | End: 2020-01-01

## 2020-01-24 NOTE — ED NOTES
[facility] at this time.           Jemima Nunez, RN  20

## 2020-01-24 NOTE — ED NOTES
Brigida Smith; patients daughter signed provisional of care at this time.       Jemima Nunez, RN  01/23/20 1946

## 2020-01-24 NOTE — ED NOTES
Patients Daughter, Brigida Smith reports she would like the patient to go to [facility]. Provisional of care filled out at this time.      Jemima Nunez, RN  20 1950

## 2020-01-24 NOTE — ED NOTES
Brigida Smith; Patients daughter; [phone]  Adilia Campbell; Patients daughter; [phone]  Charity Tsai; Patients daughter; [phone]       Jemima Nunez RN  01/23/20 8992

## 2020-01-24 NOTE — ED NOTES
Yellow gold ring noted to patients right fourth finger taken off at this time. Two silver rings noted to the left third and fourth finger taken off at this time. Rings taken off at this time, and placed in patient belongings with patients sticker placed on bag. Patient bag given to family at this time.      Jemima Nunez, RN  01/23/20 1929

## 2020-01-24 NOTE — ED NOTES
Report received from HANNAH Whipple at this time. Patient noted to be cleaned up at this time. Patients lines, drains, tubes all removed at this time. Patient belongings given to family prior to shift. Patient placed into gown, and presented for family. Family given patients belongings.      Jemima Nunez, RN  01/23/20 1930       Jemima Nunez RN  01/23/20 1958

## 2020-01-24 NOTE — ED NOTES
[facility] signed POC at this time.        Jemima Nunez, RN  01/23/20 2037

## 2020-01-24 NOTE — ED NOTES
[facility], here to get obtain patient at this time. Assisted the patient to  [facility] stretcher at this time.      Jemima Nunez, HANNAH  20

## 2020-01-25 LAB — BACTERIA SPEC AEROBE CULT: NORMAL

## 2023-07-07 NOTE — OUTREACH NOTE
Sepsis Week 3 Survey      Responses   Facility patient discharged from?  [facility]   Does the patient have one of the following disease processes/diagnoses(primary or secondary)?  Sepsis   Week 3 attempt successful?  Yes   Call start time  1011   Call end time  1016   Discharge diagnosis  Sepsis 2/2 Bronchitis vs PNA   Meds reviewed with patient/caregiver?  Yes   Is the patient taking all medications as directed (includes completed medication regime)?  Yes   Medication comments  Pt to start chemo this week and f/u with her oncologist   Has the patient kept scheduled appointments due by today?  Yes   What is the patient's perception of their health status since discharge?  Improving   Is the patient/caregiver able to teach back signs and symptoms of worsening condition:  Fever, Rapid heart rate (>90), Shortness of breath/rapid respiratory rate, Altered mental status(confusion/coma)   Is the patient/caregiver able to teach back the hierarchy of who to call/visit for symptoms/problems? PCP, Specialist, Home health nurse, Urgent Care, ED, 911  Yes   Additional teach back comments  Daughter states pt doing well and will start chemo   Week 3 call completed?  Yes   Revoked  No further contact(revokes)-requires comment   Graduated/Revoked comments  Daughter states pt has nurses and is getting back to MD so she feels they have enugh people checking on pt.          Honey Simeon RN   Name band;